# Patient Record
Sex: MALE | Race: WHITE
[De-identification: names, ages, dates, MRNs, and addresses within clinical notes are randomized per-mention and may not be internally consistent; named-entity substitution may affect disease eponyms.]

---

## 2019-07-24 ENCOUNTER — HOSPITAL ENCOUNTER (EMERGENCY)
Dept: HOSPITAL 44 - ED | Age: 59
Discharge: ANOTHER HOSPITAL | End: 2019-07-24
Payer: SELF-PAY

## 2019-07-24 VITALS — SYSTOLIC BLOOD PRESSURE: 131 MMHG | DIASTOLIC BLOOD PRESSURE: 82 MMHG

## 2019-07-24 DIAGNOSIS — R07.2: Primary | ICD-10-CM

## 2019-07-24 LAB
BASOPHILS NFR BLD: 0.5 % (ref 0–1.5)
EGFR (NON-AFRICAN): > 60
MCV RBC AUTO: 87 FL (ref 80–100)
NEUTROPHILS #: 2.9 # K/UL (ref 1.4–7.7)

## 2019-07-24 PROCEDURE — 71045 X-RAY EXAM CHEST 1 VIEW: CPT

## 2019-07-24 PROCEDURE — 99283 EMERGENCY DEPT VISIT LOW MDM: CPT

## 2019-07-24 PROCEDURE — 99284 EMERGENCY DEPT VISIT MOD MDM: CPT

## 2019-07-24 PROCEDURE — 85025 COMPLETE CBC W/AUTO DIFF WBC: CPT

## 2019-07-24 PROCEDURE — 80053 COMPREHEN METABOLIC PANEL: CPT

## 2019-07-24 PROCEDURE — S1016 NON-PVC INTRAVENOUS ADMINIST: HCPCS

## 2019-07-24 PROCEDURE — 96372 THER/PROPH/DIAG INJ SC/IM: CPT

## 2019-07-24 PROCEDURE — 84484 ASSAY OF TROPONIN QUANT: CPT

## 2019-07-24 NOTE — ED PHYSICIAN DOCUMENTATION
Chest Pain





- HISTORIAN


Historian: patient





- HPI


Stated Complaint: "my chest pain is flaring up again"


Chief Complaint: Chest Pain


Additional Information: 





Patient presents to ED with a 45 minute history of left chest pain.   Patient 

states he was riding in his friends [place] when he began to 

have left sided chest pain, sharp, 9/10, radiating into left arm, left neck with

associated shortness of breath.  He has a cardiac history with 3 stents (2005, 

2015).  He just saw his cardiologist 2 weeks ago and with no change in his plan 

of care.  He took 3 nitro and ASA 325mg just prior to arrival with no improvem

ent in his chest pain.  He asks if he can have pain meds.  


Onset: minutes (45)


Timing: sudden onset


Duration: sudden-onset


Last known Well Date: 07/24/19


Last Known Well Time: 17:00


Context: other


Severity: moderate


Quality: sharp


Chest Pain Radiation: arms (left ), neck


Chest Pain Signs/Symptoms: denies: nausea, vomiting


Worsened By: nothing


Relieved By: nothing





- ROS


CONST: none


MS/LYMPH: none


GI/: denies: vomiting, nausea


EYES/ENT: none


SKIN/ENDO: none


NEURO/PSYCH: denies: headache





- PAST HX


MI risk factors: cardiac disease


DVT/PE Risk Factors: none


TAD/AAA risk factors: none


Neuro deficit: CVA


GI disease: none


Lung disease: none


Surgeries/Procedures: cardiac cath, cardiac stent


Allergies/Adverse Reactions: 


                                    Allergies











Allergy/AdvReac Type Severity Reaction Status Date / Time


 


albuterol Allergy   Verified 07/24/19 16:43











Home Medications: 


                                Ambulatory Orders











 Medication  Instructions  Recorded


 


Clopidogrel Bisulfate [Clopidogrel] 1 tab PO DAILY 07/24/19


 


Metoprolol Succinate [Kapspargo 0.25 tab PO DAILY 07/24/19





Sprinkle]  


 


Nitroglycerin [Nitroquick] 1 tab SQ Q5 PRN 07/24/19


 


Pregabalin [Lyrica] 1 cap PO BID 07/24/19


 


Ranolazine [Ranexa] 1 tab PO BID 07/24/19


 


Rosuvastatin Calcium [Crestor] 1 tab PO HS 07/24/19


 


Tiotropium Bromide [Spiriva] 1 cap INH DAILY 07/24/19














- SOCIAL HX


Smoking History: cigarettes, greater than 1 pack/day


Alcohol Use: none


Drug Use: none





- FAMILY HX


Family HX: none





- VITAL SIGNS


Vital Signs: 





                                   Vital Signs











Temp Pulse Resp BP Pulse Ox


 


    101 H  16   120/73   95 


 


    07/24/19 16:28  07/24/19 16:28  07/24/19 16:28  07/24/19 16:28














- REVIEWED ASSESSMENTS


Nursing Assessment  Reviewed: Yes


Vitals Reviewed: Yes





Progress





- Progress


Progress: 





1820  Patient getting ready for discharge after negative second troponin.  

Patient was chest pain free at this time.  Once patient was informed we did not 

provide Medicaid transport to his home in [facility].  He got extremely angry. 

  He consulted us on how to get medicaid transport.  When we were unable to 

arrange transport he stated he wanted to be transferred to another facility. He 

reported the return to his chest pain, and stated, "something is wrong".





1933  Discussed transfer with Dr. Stokes, hospitalist at [facility].   She 

accepts transfer.  We will arrange transfer.  





1935  Patient ambulating to nurses station asking for the WiFi password.  He is 

not complaining of chest pain and is no apparent distress.  





2053  EMS transporting patient to [facility]. 





- EKG/XRAY/CT


Comments: 1636 NSR  99 bpm,  NO ST elevation





ED Results Lab/Radiology





- Lab Results


Lab Results: 





                                   Lab Results











  07/24/19





  16:40


 


WBC  5.30 K/ul K/ul





   (4.00-12.00) 


 


RBC  3.78 M/ul L M/ul





   (3.90-5.20) 


 


Hgb  11.2 g/dL L g/dL





   (12.0-18.0) 


 


Hct  33.0 % L %





   (37.0-53.0) 


 


MCV  87.0 fl fl





   (80.0-100.0) 


 


MCH  29.7 pg pg





   (28.0-34.0) 


 


MCHC  34.0 g/dL g/dL





   (30.0-36.0) 


 


RDW  13.1 % %





   (11.3-14.3) 


 


Plt Count  250 K/mm3 K/mm3





   (130-400) 


 


Neut % (Auto)  55.6 % %





   (39.0-79.0) 


 


Lymph % (Auto)  28.0 % %





   (16.0-50.0) 


 


Mono % (Auto)  10.2 % %





   (0.0-11.0) 


 


Eos % (Auto)  5.7 % %





   (0.0-6.8) 


 


Baso % (Auto)  0.5 % %





   (0.0-1.5) 


 


Neut # (Auto)  2.9 # k/uL # k/uL





   (1.4-7.7) 


 


Lymph # (Auto)  1.5 # k/uL # k/uL





   (0.6-4.0) 


 


Mono # (Auto)  0.5 # k/uL # k/uL





   (0.0-0.9) 


 


Eos # (Auto)  0.3 # k/uL # k/uL





   (0.0-0.6) 


 


Baso # (Auto)  0.0 # k/uL # k/uL





   (0.0-0.5) 














- Orders


Orders: 





                                    ED Orders











 Category Date Time Status


 


 Place IV Lock 1T Care  07/24/19 16:41 Active


 


 CHEST 1VIEW [RAD] Stat Exams  07/24/19 Ordered


 


 CBC/PLATELET/DIFF Routine Lab  07/24/19 16:40 Completed


 


 CMP Routine Lab  07/24/19 16:40 Received


 


 TROPONIN I Stat Lab  07/24/19 16:40 Received


 


 EKG WITH COMPARISON Stat Ther  07/24/19 Ordered














Chest Pain Physical Exam





- EXAM


General Appearance: no acute distress, alert


EENT: NYA


Neck: nml inspection, no carotid bruit


Respiratory: no resp. distress, chest non-tender, nml breath sounds


CVS: reg. rate & rhythm, no murmur


Abdomen: soft, normal bowel sounds, non-tender


Skin: warm/dry


Extremities: non-tender


Neuro: oriented X3, motor nml, mood/affect nml





Discharge


Clincal Impression: 


Chest pain


Qualifiers:


 Chest pain type: precordial pain Qualified Code(s): R07.2 - Precordial pain





Referrals: 


Primary Doctor,No [Primary Care Provider] - 2 Days


Additional Instructions: 


1.  Follow up with your Cardiologist as soon as possible


2.  Resume all your home medications as previously prescribed. 


3.  Return to ER for new or worsening symptoms.  





Condition: Stable


Disposition: 02 XFER [facility]M HOSP


Decision to Admit: NO


Date of Decison to Admit: 07/24/19


Decision Time: 19:33

## 2019-07-25 NOTE — DIAGNOSTIC IMAGING REPORT
CAROLANN VARGAS 

[facility]

[address] [address] [address] [facility] [address] [address]

[place], [place]. [address]

 

 

 

 

Report Submission Date: 2019 5:23:09 PM CDT

Patient       Study

Name:   ROBBY ALLISON       Date:   2019 4:44:35 PM CDT

MRN:   [id number]       Modality Type:   DX

Gender:   M       Description:   CHEST 1VIEW

:   60       Institution:   [facility]

Physician:   CAROLANN VARGAS

     Accession:    [id number]

 

 

Portable chest 



History:  Chest pain 



Portable chest dated 2019 demonstrates a single lead pacing device.  
Heart size is within normal limits.  Pulmonary vascularity is normal.  Lungs are
clear. 



Impression: 



Single lead pacing device. 



No active disease.

 

Electronically signed on 2019 5:23:09 PM CDT by:

Luz CURRAN

## 2020-07-09 ENCOUNTER — HOSPITAL ENCOUNTER (INPATIENT)
Dept: HOSPITAL 61 - ER | Age: 60
LOS: 1 days | Discharge: OTHER INSTITUTION | DRG: 206 | End: 2020-07-10
Attending: INTERNAL MEDICINE | Admitting: INTERNAL MEDICINE
Payer: MEDICAID

## 2020-07-09 VITALS — DIASTOLIC BLOOD PRESSURE: 51 MMHG | SYSTOLIC BLOOD PRESSURE: 95 MMHG

## 2020-07-09 VITALS — SYSTOLIC BLOOD PRESSURE: 133 MMHG | DIASTOLIC BLOOD PRESSURE: 82 MMHG

## 2020-07-09 VITALS — HEIGHT: 67 IN | WEIGHT: 145.51 LBS | BODY MASS INDEX: 22.84 KG/M2

## 2020-07-09 VITALS — DIASTOLIC BLOOD PRESSURE: 55 MMHG | SYSTOLIC BLOOD PRESSURE: 107 MMHG

## 2020-07-09 DIAGNOSIS — E78.00: ICD-10-CM

## 2020-07-09 DIAGNOSIS — I42.8: ICD-10-CM

## 2020-07-09 DIAGNOSIS — Z88.8: ICD-10-CM

## 2020-07-09 DIAGNOSIS — Z90.81: ICD-10-CM

## 2020-07-09 DIAGNOSIS — I25.5: ICD-10-CM

## 2020-07-09 DIAGNOSIS — M19.90: ICD-10-CM

## 2020-07-09 DIAGNOSIS — F15.11: ICD-10-CM

## 2020-07-09 DIAGNOSIS — D64.9: ICD-10-CM

## 2020-07-09 DIAGNOSIS — I50.9: ICD-10-CM

## 2020-07-09 DIAGNOSIS — E78.5: ICD-10-CM

## 2020-07-09 DIAGNOSIS — F41.9: ICD-10-CM

## 2020-07-09 DIAGNOSIS — I25.10: ICD-10-CM

## 2020-07-09 DIAGNOSIS — Z95.810: ICD-10-CM

## 2020-07-09 DIAGNOSIS — Z83.3: ICD-10-CM

## 2020-07-09 DIAGNOSIS — I25.2: ICD-10-CM

## 2020-07-09 DIAGNOSIS — J45.909: ICD-10-CM

## 2020-07-09 DIAGNOSIS — Z82.49: ICD-10-CM

## 2020-07-09 DIAGNOSIS — Z86.73: ICD-10-CM

## 2020-07-09 DIAGNOSIS — K21.9: ICD-10-CM

## 2020-07-09 DIAGNOSIS — M94.0: Primary | ICD-10-CM

## 2020-07-09 DIAGNOSIS — F17.210: ICD-10-CM

## 2020-07-09 DIAGNOSIS — Z95.5: ICD-10-CM

## 2020-07-09 DIAGNOSIS — I11.0: ICD-10-CM

## 2020-07-09 LAB
ALBUMIN SERPL-MCNC: 3.2 G/DL (ref 3.4–5)
ALBUMIN/GLOB SERPL: 0.9 {RATIO} (ref 1–1.7)
ALP SERPL-CCNC: 88 U/L (ref 46–116)
ALT SERPL-CCNC: 19 U/L (ref 16–63)
AMORPH SED URNS QL MICRO: PRESENT /HPF
AMPHETAMINE/METHAMPHETAMINE: (no result)
ANION GAP SERPL CALC-SCNC: 6 MMOL/L (ref 6–14)
APTT BLD: 33 SEC (ref 24–38)
APTT PPP: YELLOW S
AST SERPL-CCNC: 16 U/L (ref 15–37)
BACTERIA #/AREA URNS HPF: 0 /HPF
BARBITURATES UR-MCNC: (no result) UG/ML
BASOPHILS # BLD AUTO: 0.1 X10^3/UL (ref 0–0.2)
BASOPHILS NFR BLD: 1 % (ref 0–3)
BENZODIAZ UR-MCNC: (no result) UG/L
BILIRUB SERPL-MCNC: 0.3 MG/DL (ref 0.2–1)
BILIRUB UR QL STRIP: NEGATIVE
BUN SERPL-MCNC: 10 MG/DL (ref 8–26)
BUN/CREAT SERPL: 8 (ref 6–20)
CALCIUM SERPL-MCNC: 8.1 MG/DL (ref 8.5–10.1)
CANNABINOIDS UR-MCNC: (no result) UG/L
CHLORIDE SERPL-SCNC: 105 MMOL/L (ref 98–107)
CK SERPL-CCNC: 125 U/L (ref 39–308)
CO2 SERPL-SCNC: 30 MMOL/L (ref 21–32)
COCAINE UR-MCNC: (no result) NG/ML
CREAT SERPL-MCNC: 1.2 MG/DL (ref 0.7–1.3)
EOSINOPHIL NFR BLD: 0.2 X10^3/UL (ref 0–0.7)
EOSINOPHIL NFR BLD: 5 % (ref 0–3)
ERYTHROCYTE [DISTWIDTH] IN BLOOD BY AUTOMATED COUNT: 16.7 % (ref 11.5–14.5)
FIBRINOGEN PPP-MCNC: CLEAR MG/DL
GFR SERPLBLD BASED ON 1.73 SQ M-ARVRAT: 61.8 ML/MIN
GLOBULIN SER-MCNC: 3.6 G/DL (ref 2.2–3.8)
GLUCOSE SERPL-MCNC: 91 MG/DL (ref 70–99)
HCT VFR BLD CALC: 26.8 % (ref 39–53)
HGB BLD-MCNC: 8.9 G/DL (ref 13–17.5)
LIPASE: 94 U/L (ref 73–393)
LYMPHOCYTES # BLD: 1.1 X10^3/UL (ref 1–4.8)
LYMPHOCYTES NFR BLD AUTO: 20 % (ref 24–48)
MAGNESIUM SERPL-MCNC: 2 MG/DL (ref 1.8–2.4)
MCH RBC QN AUTO: 27 PG (ref 25–35)
MCHC RBC AUTO-ENTMCNC: 33 G/DL (ref 31–37)
MCV RBC AUTO: 80 FL (ref 79–100)
METHADONE SERPL-MCNC: (no result) NG/ML
MONO #: 0.7 X10^3/UL (ref 0–1.1)
MONOCYTES NFR BLD: 13 % (ref 0–9)
NEUT #: 3.3 X10^3/UL (ref 1.8–7.7)
NEUTROPHILS NFR BLD AUTO: 61 % (ref 31–73)
NITRITE UR QL STRIP: NEGATIVE
OPIATES UR-MCNC: (no result) NG/ML
PCP SERPL-MCNC: (no result) MG/DL
PH UR STRIP: 7 [PH]
PLATELET # BLD AUTO: 266 X10^3/UL (ref 140–400)
POTASSIUM SERPL-SCNC: 4 MMOL/L (ref 3.5–5.1)
PROT SERPL-MCNC: 6.8 G/DL (ref 6.4–8.2)
PROT UR STRIP-MCNC: NEGATIVE MG/DL
PROTHROMBIN TIME: 13.8 SEC (ref 11.7–14)
RBC # BLD AUTO: 3.36 X10^6/UL (ref 4.3–5.7)
RBC #/AREA URNS HPF: (no result) /HPF (ref 0–2)
SODIUM SERPL-SCNC: 141 MMOL/L (ref 136–145)
UROBILINOGEN UR-MCNC: 1 MG/DL
WBC # BLD AUTO: 5.4 X10^3/UL (ref 4–11)
WBC #/AREA URNS HPF: 0 /HPF (ref 0–4)

## 2020-07-09 PROCEDURE — 93306 TTE W/DOPPLER COMPLETE: CPT

## 2020-07-09 PROCEDURE — 83550 IRON BINDING TEST: CPT

## 2020-07-09 PROCEDURE — 83540 ASSAY OF IRON: CPT

## 2020-07-09 PROCEDURE — G0378 HOSPITAL OBSERVATION PER HR: HCPCS

## 2020-07-09 PROCEDURE — 80061 LIPID PANEL: CPT

## 2020-07-09 PROCEDURE — 99285 EMERGENCY DEPT VISIT HI MDM: CPT

## 2020-07-09 PROCEDURE — 80307 DRUG TEST PRSMV CHEM ANLYZR: CPT

## 2020-07-09 PROCEDURE — 82553 CREATINE MB FRACTION: CPT

## 2020-07-09 PROCEDURE — 84484 ASSAY OF TROPONIN QUANT: CPT

## 2020-07-09 PROCEDURE — 81001 URINALYSIS AUTO W/SCOPE: CPT

## 2020-07-09 PROCEDURE — 93005 ELECTROCARDIOGRAM TRACING: CPT

## 2020-07-09 PROCEDURE — 85379 FIBRIN DEGRADATION QUANT: CPT

## 2020-07-09 PROCEDURE — 83690 ASSAY OF LIPASE: CPT

## 2020-07-09 PROCEDURE — 80053 COMPREHEN METABOLIC PANEL: CPT

## 2020-07-09 PROCEDURE — 71045 X-RAY EXAM CHEST 1 VIEW: CPT

## 2020-07-09 PROCEDURE — 85025 COMPLETE CBC W/AUTO DIFF WBC: CPT

## 2020-07-09 PROCEDURE — 85730 THROMBOPLASTIN TIME PARTIAL: CPT

## 2020-07-09 PROCEDURE — 83735 ASSAY OF MAGNESIUM: CPT

## 2020-07-09 PROCEDURE — 80048 BASIC METABOLIC PNL TOTAL CA: CPT

## 2020-07-09 PROCEDURE — 96375 TX/PRO/DX INJ NEW DRUG ADDON: CPT

## 2020-07-09 PROCEDURE — 83880 ASSAY OF NATRIURETIC PEPTIDE: CPT

## 2020-07-09 PROCEDURE — 96374 THER/PROPH/DIAG INJ IV PUSH: CPT

## 2020-07-09 PROCEDURE — 36415 COLL VENOUS BLD VENIPUNCTURE: CPT

## 2020-07-09 PROCEDURE — 85610 PROTHROMBIN TIME: CPT

## 2020-07-09 RX ADMIN — FENTANYL CITRATE PRN MCG: 50 INJECTION INTRAMUSCULAR; INTRAVENOUS at 17:48

## 2020-07-09 RX ADMIN — FENTANYL CITRATE PRN MCG: 50 INJECTION INTRAMUSCULAR; INTRAVENOUS at 14:56

## 2020-07-09 RX ADMIN — IPRATROPIUM BROMIDE SCH MG: 0.5 SOLUTION RESPIRATORY (INHALATION) at 16:00

## 2020-07-09 RX ADMIN — IPRATROPIUM BROMIDE SCH MG: 0.5 SOLUTION RESPIRATORY (INHALATION) at 08:40

## 2020-07-09 RX ADMIN — PREGABALIN SCH MG: 75 CAPSULE ORAL at 21:02

## 2020-07-09 RX ADMIN — FENTANYL CITRATE PRN MCG: 50 INJECTION INTRAMUSCULAR; INTRAVENOUS at 21:03

## 2020-07-09 NOTE — PDOC1
History and Physical


Date of Admission


Date of Admission


DATE: 7/9/20 


TIME: 14:19





Identification/Chief Complaint


Chief Complaint


Chest pain





Source


Source:  Patient





History of Present Illness


History of Present Illness


Mr Bishop is a 59yo M w/ PMHx CAD x multiple GENNY, CHF, HTN, HLD, VT s/p AICD, 

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

chest pain with radiation to left arm and jaw. Happened in the [facility] and 

currently visiting from the [facility] where he lives. Took 3 NTG with 

improvement of pain from 10/10 to 6/10 and had 324mg ASA enroute from EMS. No 

nausea or vomiting no chills no fever no recent sick contacts.  He has shortness

of breath at its baseline.  He notes he takes Spiriva only as an inhaler and 

would like to continue to do so.


Troponin negative, BNP 5044, , K4, BUN 10, CR 1.2, glucose 91, WBC 5.4 

currently Hb 8.9 with prior of 13 noted INR 1.1 platelets 266.  EKG with lateral

TWI and leftward axis, no STEMI.


Chest x-ray with patchy bibasilar opacities unchanged from prior, likely 

atelectasis.





Historically with multiple PCIs- LHC at [facility] Jan 2018 showed showed patent

mid LAD stent, patent 1st diagonal stent, total chronic occlusion of first 

obtuse marginal with territory infarct but no indication for PCI, patent stent 

in left AV groove artery, patent stent in LPDA, non-dominant 100 % stenosis of 

proximal RCA. Seen by cardiology here in January.


He does continue to smoke states he smokes less than half pack a day, he is 

asking me for food and would like to talk about pain medications that he can go 

home on.  I advised him that given his history of substance abuse this is ill 

advised.


Admit for further work-up of his coronary artery disease





Past Medical History


Cardiovascular:  CAD, CHF, HTN, Hyperlipidemia, Other


Pulmonary:  Asthma


CENTRAL NERVOUS SYSTEM:  CVA


GI:  GERD


Heme/Onc:  No pertinent hx


Hepatobiliary:  No pertinent hx


Psych:  Anxiety


Musculoskeletal:  Osteoarthritis


Rheumatologic:  No pertinent hx


Infectious disease:  No pertinent hx


Renal/:  No pertinent hx


Endocrine:  No pertinent hx





Past Surgical History


Past Surgical History:  Pacemaker, Other





Family History


Family History:  Coronary Artery Disease, Diabetes, Heart Disease, Hypertension





Social History


Smoke:  <1 pack per day


ALCOHOL:  none


Drugs:  Crystal meth (In remission), Other





Current Medications


Current Medications





Current Medications


Fentanyl Citrate (Fentanyl 2ml Vial) 50 mcg 1X  ONCE IV  Last administered on 

7/9/20at 13:17;  Start 7/9/20 at 13:15;  Stop 7/9/20 at 13:16;  Status DC


Ondansetron HCl (Zofran) 4 mg 1X  ONCE IV  Last administered on 7/9/20at 13:17; 

Start 7/9/20 at 13:15;  Stop 7/9/20 at 13:16;  Status DC





Active Scripts


Active


Reported


Spiriva (Tiotropium Bromide) 18 Mcg Cap.w.dev 1 Cap IH DAILY


Atorvastatin Calcium 10 Mg Tablet 10 Mg PO HS


Metoprolol Succinate ( Xl ) (Metoprolol Succinate) 25 Mg Tab.er.24h 12.5 Mg PO 

DAILY


Spiriva (Tiotropium Hidalgo) 18 Mcg Cap.w.dev 1 Cap IH DAILY


Ms Contin (Morphine Sulfate) 30 Mg Tablet.er 1 Tab PO BID PRN


NITROGLYCERIN SubLingual (Nitroglycerin) 0.4 Mg Tab.subl 0.4 Mg SL PRN Q5MIN PRN


Lyrica (Pregabalin) 300 Mg Capsule 1 Cap PO BID


Plavix (Clopidogrel Bisulfate) 75 Mg Tablet 75 Mg PO DAILY


Aspir 81 (Aspirin) 81 Mg Tablet.dr 1 Tab PO DAILY





Allergies


Allergies:  


Coded Allergies:  


     albuterol (Verified  Adverse Reaction, Intermediate, 1/22/20)


   


   Patient states "it speeds my heart up too much"


     ibuprofen (Verified  Adverse Reaction, Intermediate, Nausea and Vomiting, 

1/22/20)





ROS


General:  YES: Fatigue, Malaise; 


   No: Chills, Night Sweats, Appetite, Other


PSYCHOLOGICAL ROS:  YES: Anxiety; 


   No: Behavioral Disorder, Concentration difficultie, Decreased libido, 

Depression, Disorientation, Hallucinations, Hostility, Irritablity, Memory 

difficulties, Mood Swings, Obsessive thoughts, Physical abuse, Sexual abuse, 

Sleep disturbances, Suicidal ideation, Other


Eyes:  No Blurry vision, No Decreased vision, No Double vision, No Dry eyes, No 

Excessive tearing, No Eye Pain, No Itchy Eyes, No Loss of vision, No 

Photophobia, No Scotomata, No Uses contacts, No Uses glasses, No Other


HEENT:  No: Heacaches, Visual Changes, Hearing change, Nasal congestion, Nasal 

discharge, Oral lesions, Sinus pain, Sore Throat, Epistaxis, Sneezing, Snoring, 

Tinnitus, Vertigo, Vocal changes, Other


ALLERGY AND IMMUNOLOGY:  No: Hives, Insect Bite Sensitivity, Itchy/Watery Eyes, 

Nasal Congestion, Post Nasal Drip, Seasonal Allergies, Other


Hematological and Lymphatic:  No: Bleeding Problems, Blood Clots, Blood 

Transfusions, Brusing, Night Sweats, Pallor, Swollen Lymph Nodes, Other


ENDOCRINE:  No: Breast Changes, Galactorrhea, Hair Pattern Changes, Hot Flashes,

Malaise/lethargy, Mood Swings, Palpitations, Polydipsia/polyuria, Skin Changes, 

Temperature Intolerance, Unexpected Weight Changes, Other


Breast:  No New/Changing Breast Lumps, No Nipple changes, No Nipple discharge, 

No Other


Respiratory:  YES: Shortness of breath, Wheezing; 


   No: Cough, Hemoptysis, Orthopnea, Pleuritic Pain, SOB with excertion, Sputum 

Changes, Stridor, Tachypnea, Other


Cardiovascular:  yes Chest Pain; 


   No Palpitations, No Orthopnea, No Paroxysmal Noc. Dyspnea, No Edema, No Lt 

Headedness, No Other


Gastrointestinal:  No Nausea, No Vomiting, No Abdominal Pain, No Diarrhea, No 

Constipation, No Melena, No Hematochezia, No Other


Genitourinary:  No Dysuria, No Frequency, No Incontinence, No Hematuria, No 

Retention, No Discharge, No Urgency, No Pain, No Flank Pain, No Other, No , No ,

No , No , No , No , No 


Musculoskeletal:  No Gait Disturbance, No Joint Pain, No Joint Stiffness, No 

Joint Swelling, No Muscle Pain, No Muscular Weakness, No Pain In:, No Swelling 

In:, No Other


Neurological:  No Behavorial Changes, No Bowel/Bladder ControlChng, No 

Confusion, No Dizziness, No Gait Disturbance, No Headaches, No Impaired 

Coord/balance, No Memory Loss, No Numbness/Tingling, No Seizures, No Speech P

roblems, No Tremors, No Visual Changes, No Weakness, No Other


Skin:  No Dry Skin, No Eczema, No Hair Changes, No Lumps, No Mole Changes, No 

Mottling, No Nail Changes, No Pruritus, No Rash, No Skin Lesion Changes, No 

Other, No Acne





Physical Exam


General:  Alert, Oriented X3, Cooperative, No acute distress


HEENT:  Atraumatic, PERRLA, EOMI, Mucous membr. moist/pink


Lungs:  Other (scattered wheezing)


Heart:  S1S2, RRR, no thrills, no rubs, no gallops, no murmurs


Abdomen:  Normal bowel sounds, Soft, No tenderness, No hepatosplenomegaly, No 

masses


Rectal Exam:  not examined


Extremities:  No clubbing, No cyanosis, No edema, Normal pulses, No 

tenderness/swelling


Skin:  No rashes, No breakdown, No significant lesion, Other (thrombophlebitis 

scarring)


Neuro:  Normal gait, Normal speech, Strength at 5/5 X4 ext, Normal tone, 

Sensation intact, Cranial nerves 3-12 NL, Reflexes 2+


Psych/Mental Status:  Mental status NL, Mood NL





Vitals


Vitals





Vital Signs








  Date Time  Temp Pulse Resp B/P (MAP) Pulse Ox O2 Delivery O2 Flow Rate FiO2


 


7/9/20 13:29  78  107/65 (79) 97 Room Air  


 


7/9/20 12:30 98.3  20     





 98.3       











Labs


Labs





Laboratory Tests








Test


 7/9/20


13:00 7/9/20


13:24


 


White Blood Count


 5.4 x10^3/uL


(4.0-11.0) 





 


Red Blood Count


 3.36 x10^6/uL


(4.30-5.70) 





 


Hemoglobin


 8.9 g/dL


(13.0-17.5) 





 


Hematocrit


 26.8 %


(39.0-53.0) 





 


Mean Corpuscular Volume 80 fL ()  


 


Mean Corpuscular Hemoglobin 27 pg (25-35)  


 


Mean Corpuscular Hemoglobin


Concent 33 g/dL


(31-37) 





 


Red Cell Distribution Width


 16.7 %


(11.5-14.5) 





 


Platelet Count


 266 x10^3/uL


(140-400) 





 


Neutrophils (%) (Auto) 61 % (31-73)  


 


Lymphocytes (%) (Auto) 20 % (24-48)  


 


Monocytes (%) (Auto) 13 % (0-9)  


 


Eosinophils (%) (Auto) 5 % (0-3)  


 


Basophils (%) (Auto) 1 % (0-3)  


 


Neutrophils # (Auto)


 3.3 x10^3/uL


(1.8-7.7) 





 


Lymphocytes # (Auto)


 1.1 x10^3/uL


(1.0-4.8) 





 


Monocytes # (Auto)


 0.7 x10^3/uL


(0.0-1.1) 





 


Eosinophils # (Auto)


 0.2 x10^3/uL


(0.0-0.7) 





 


Basophils # (Auto)


 0.1 x10^3/uL


(0.0-0.2) 





 


Prothrombin Time


 13.8 SEC


(11.7-14.0) 





 


Prothromb Time International


Ratio 1.1 (0.8-1.1) 


 





 


Activated Partial


Thromboplast Time 33 SEC (24-38) 


 





 


Sodium Level


 141 mmol/L


(136-145) 





 


Potassium Level


 4.0 mmol/L


(3.5-5.1) 





 


Chloride Level


 105 mmol/L


() 





 


Carbon Dioxide Level


 30 mmol/L


(21-32) 





 


Anion Gap 6 (6-14)  


 


Blood Urea Nitrogen


 10 mg/dL


(8-26) 





 


Creatinine


 1.2 mg/dL


(0.7-1.3) 





 


Estimated GFR


(Cockcroft-Gault) 61.8 


 





 


BUN/Creatinine Ratio 8 (6-20)  


 


Glucose Level


 91 mg/dL


(70-99) 





 


Calcium Level


 8.1 mg/dL


(8.5-10.1) 





 


Magnesium Level


 2.0 mg/dL


(1.8-2.4) 





 


Total Bilirubin


 0.3 mg/dL


(0.2-1.0) 





 


Aspartate Amino Transf


(AST/SGOT) 16 U/L (15-37) 


 





 


Alanine Aminotransferase


(ALT/SGPT) 19 U/L (16-63) 


 





 


Alkaline Phosphatase


 88 U/L


() 





 


Creatine Kinase


 125 U/L


() 





 


Creatine Kinase MB (Mass)


 0.9 ng/mL


(0.0-3.6) 





 


Creatine Kinase MB Relative


Index 0.7 % (0-4) 


 





 


Troponin I Quantitative


 < 0.017 ng/mL


(0.000-0.055) 





 


NT-Pro-B-Type Natriuretic


Peptide 5044 pg/mL


(0-124) 





 


Total Protein


 6.8 g/dL


(6.4-8.2) 





 


Albumin


 3.2 g/dL


(3.4-5.0) 





 


Albumin/Globulin Ratio 0.9 (1.0-1.7)  


 


Lipase


 94 U/L


() 





 


Urine Collection Type  Unknown 


 


Urine Color  Yellow 


 


Urine Clarity  Clear 


 


Urine pH  7.0 (<5.0-8.0) 


 


Urine Specific Gravity


 


 1.010


(1.000-1.030)


 


Urine Protein


 


 Negative mg/dL


(NEG-TRACE)


 


Urine Glucose (UA)


 


 Negative mg/dL


(NEG)


 


Urine Ketones (Stick)


 


 Negative mg/dL


(NEG)


 


Urine Blood  Negative (NEG) 


 


Urine Nitrite  Negative (NEG) 


 


Urine Bilirubin  Negative (NEG) 


 


Urine Urobilinogen Dipstick


 


 1.0 mg/dL (0.2


mg/dL)


 


Urine Leukocyte Esterase  Negative (NEG) 


 


Urine RBC  1-2 /HPF (0-2) 


 


Urine WBC  0 /HPF (0-4) 


 


Urine Amorphous Sediment  Present /HPF 


 


Urine Bacteria  0 /HPF (0-FEW) 








Laboratory Tests








Test


 7/9/20


13:00 7/9/20


13:24


 


White Blood Count


 5.4 x10^3/uL


(4.0-11.0) 





 


Red Blood Count


 3.36 x10^6/uL


(4.30-5.70) 





 


Hemoglobin


 8.9 g/dL


(13.0-17.5) 





 


Hematocrit


 26.8 %


(39.0-53.0) 





 


Mean Corpuscular Volume 80 fL ()  


 


Mean Corpuscular Hemoglobin 27 pg (25-35)  


 


Mean Corpuscular Hemoglobin


Concent 33 g/dL


(31-37) 





 


Red Cell Distribution Width


 16.7 %


(11.5-14.5) 





 


Platelet Count


 266 x10^3/uL


(140-400) 





 


Neutrophils (%) (Auto) 61 % (31-73)  


 


Lymphocytes (%) (Auto) 20 % (24-48)  


 


Monocytes (%) (Auto) 13 % (0-9)  


 


Eosinophils (%) (Auto) 5 % (0-3)  


 


Basophils (%) (Auto) 1 % (0-3)  


 


Neutrophils # (Auto)


 3.3 x10^3/uL


(1.8-7.7) 





 


Lymphocytes # (Auto)


 1.1 x10^3/uL


(1.0-4.8) 





 


Monocytes # (Auto)


 0.7 x10^3/uL


(0.0-1.1) 





 


Eosinophils # (Auto)


 0.2 x10^3/uL


(0.0-0.7) 





 


Basophils # (Auto)


 0.1 x10^3/uL


(0.0-0.2) 





 


Prothrombin Time


 13.8 SEC


(11.7-14.0) 





 


Prothromb Time International


Ratio 1.1 (0.8-1.1) 


 





 


Activated Partial


Thromboplast Time 33 SEC (24-38) 


 





 


Sodium Level


 141 mmol/L


(136-145) 





 


Potassium Level


 4.0 mmol/L


(3.5-5.1) 





 


Chloride Level


 105 mmol/L


() 





 


Carbon Dioxide Level


 30 mmol/L


(21-32) 





 


Anion Gap 6 (6-14)  


 


Blood Urea Nitrogen


 10 mg/dL


(8-26) 





 


Creatinine


 1.2 mg/dL


(0.7-1.3) 





 


Estimated GFR


(Cockcroft-Gault) 61.8 


 





 


BUN/Creatinine Ratio 8 (6-20)  


 


Glucose Level


 91 mg/dL


(70-99) 





 


Calcium Level


 8.1 mg/dL


(8.5-10.1) 





 


Magnesium Level


 2.0 mg/dL


(1.8-2.4) 





 


Total Bilirubin


 0.3 mg/dL


(0.2-1.0) 





 


Aspartate Amino Transf


(AST/SGOT) 16 U/L (15-37) 


 





 


Alanine Aminotransferase


(ALT/SGPT) 19 U/L (16-63) 


 





 


Alkaline Phosphatase


 88 U/L


() 





 


Creatine Kinase


 125 U/L


() 





 


Creatine Kinase MB (Mass)


 0.9 ng/mL


(0.0-3.6) 





 


Creatine Kinase MB Relative


Index 0.7 % (0-4) 


 





 


Troponin I Quantitative


 < 0.017 ng/mL


(0.000-0.055) 





 


NT-Pro-B-Type Natriuretic


Peptide 5044 pg/mL


(0-124) 





 


Total Protein


 6.8 g/dL


(6.4-8.2) 





 


Albumin


 3.2 g/dL


(3.4-5.0) 





 


Albumin/Globulin Ratio 0.9 (1.0-1.7)  


 


Lipase


 94 U/L


() 





 


Urine Collection Type  Unknown 


 


Urine Color  Yellow 


 


Urine Clarity  Clear 


 


Urine pH  7.0 (<5.0-8.0) 


 


Urine Specific Gravity


 


 1.010


(1.000-1.030)


 


Urine Protein


 


 Negative mg/dL


(NEG-TRACE)


 


Urine Glucose (UA)


 


 Negative mg/dL


(NEG)


 


Urine Ketones (Stick)


 


 Negative mg/dL


(NEG)


 


Urine Blood  Negative (NEG) 


 


Urine Nitrite  Negative (NEG) 


 


Urine Bilirubin  Negative (NEG) 


 


Urine Urobilinogen Dipstick


 


 1.0 mg/dL (0.2


mg/dL)


 


Urine Leukocyte Esterase  Negative (NEG) 


 


Urine RBC  1-2 /HPF (0-2) 


 


Urine WBC  0 /HPF (0-4) 


 


Urine Amorphous Sediment  Present /HPF 


 


Urine Bacteria  0 /HPF (0-FEW) 











Images


Images


CXR:


Patchy bibasilar opacities, unchanged. No pleural effusion. No pneumothorax. 

Left-sided ICD, unchanged. Normal heart size.


 


Impression: 


1.  Patchy bibasilar opacities, likely atelectasis.





VTE Prophylaxis Ordered


VTE Prophylaxis Devices:  Yes


VTE Pharmacological Prophylaxi:  Yes





Assessment/Plan


Assessment/Plan


A/P:


Chest pain - high risk ACS given his history and lateral TWI, will trend 

troponins, telemetry, consult cardiology


Ischemic cardiomyopathy -also with nonischemic cardiomyopathy due to 

methamphetamine use historically.  Fluid dynamics seems stable.


CAD - s/p x4 cardiac catherizations. multiple stents in the past. At least 12 

stents reported. 


VT s/p AICD - medtronic. Will consult cardiology. needs device interrogation


HTN - cont meds


HLD - cont meds


Hx of IV meth use - sober for 5 months. Tox screen negative and sees a counselor

through [facility]


Tobaccoism -counseled on cessation he is nondistended nicotine patch states he 

will need to start smoking as soon as he leaves


Anxiety/agitation -calmed with verbal counseling


History of Drug seeking behavior -advised with his substance abuse history is 

high risk and should not be given an opioid prescription on discharge and to 

minimize opioids while inpatient





FEN - Cardiac


PPX - Lovenox


FULL CODE


Dispo - inpatient for high risk ACS





Justicifation of Admission Dx:


Justifications for Admission:


Justification of Admission Dx:  Yes











FELICITY WHITLOCK MD         Jul 9, 2020 14:24

## 2020-07-09 NOTE — RAD
CHEST AP ONLY

 

History: Reason: chest pain / Spl. Instructions:  / History: 

 

Comparison: January 21, 2020

 

Findings:

Patchy bibasilar opacities, unchanged. No pleural effusion. No 

pneumothorax. Left-sided ICD, unchanged. Normal heart size.

 

Impression: 

1.  Patchy bibasilar opacities, likely atelectasis.

 

Electronically signed by: Nolan Larson DO (7/9/2020 1:26 PM) [id number]

## 2020-07-09 NOTE — PDOC2
ECHO PACHECO APRN 7/9/20 1553:


CARDIAC CONSULT


DATE OF CONSULT


Date of Consult


DATE: 7/9/20 


TIME: 15:26





REASON FOR CONSULT


Reason for Consult:


Chest pain





REFERRING PHYSICIAN


Referring Physician:


Terrell





SOURCE


Source:  Chart review, Patient





HISTORY OF PRESENT ILLNESS


HISTORY OF PRESENT ILLNESS


This is a 61 yo male admitted for complains of chest pain with radiation to left

arm and jaw. This occurred while he is in the [employer] and currently visiting from

the [facility]. He has taken 3 NTG and had some relief. Positive for 

fatigue but no SOA, n/v. He did became diaphoretic  and has significant cardiac 

history with multiple stents in the past. EMS called and was given ASA. He was 

evaluated here in 1/2020 and actually has been evaluated at  and [facility] in 

the past as well. He has ICM and has an AICD. He does have hx of noncompliance 

and continues to smoke tobacco. He saw his cardiologist in [place] 2 months ago

and told him that he is doing OK. No recent tress test nor TTE. He is somewhat 

irritable but cooperative.





PAST MEDICAL HISTORY


Past Medical History


Cardiovascular:  CAD, CHF (ICM), HTN, Hyperlipidemia, Other (VT)


Pulmonary:  Asthma


CENTRAL NERVOUS SYSTEM:  CVA


GI:  GERD


Heme/Onc:  No pertinent hx


Hepatobiliary:  No pertinent hx


Psych:  Anxiety


Musculoskeletal:  Osteoarthritis


Rheumatologic:  No pertinent hx


Infectious disease:  No pertinent hx


ENT:  No pertinent hx


Renal/:  No pertinent hx


Endocrine:  No pertinent hx


Dermatology:  No pertinent hx








PAST SURGICAL HISTORY


Past Surgical History


Pacemaker (AICD), Other (multiple PCIs- LHC at [facility]'s Jan 2018 showed showed

patent mid LAD stent, patent 1st diagonal stent, total chronic occlusion of 

first obtuse marginal with territory infarct but no indication for PCI, patent 

stent in left AV groove artery, patent stent in LPDA, non-dominant 100 % 

stenosis of proximal RCA. )





FAMILY HISTORY


Family History:  Hypertension





SOCIAL HISTORY


Social History


Smoke:  <1 pack per day


ALCOHOL:  none


Drugs:  Other (hx of meth use)


Lives:  with Family





CURRENT MEDICATIONS


CURRENT MEDICATIONS





Current Medications








 Medications


  (Trade)  Dose


 Ordered  Sig/Gwen


 Route


 PRN Reason  Start Time


 Stop Time Status Last Admin


Dose Admin


 


 Fentanyl Citrate


  (Fentanyl 2ml


 Vial)  50 mcg  1X  ONCE


 IV


   7/9/20 13:15


 7/9/20 13:16 DC 7/9/20 13:17





 


 Ondansetron HCl


  (Zofran)  4 mg  1X  ONCE


 IV


   7/9/20 13:15


 7/9/20 13:16 DC 7/9/20 13:17





 


 Fentanyl Citrate


  (Fentanyl 2ml


 Vial)  50 mcg  PRN Q1HR  PRN


 IV


 PAIN  7/9/20 14:45


 7/10/20 14:44  7/9/20 14:56














ALLERGIES


ALLERGIES:  


Coded Allergies:  


     albuterol (Verified  Adverse Reaction, Intermediate, 1/22/20)


   


   Patient states "it speeds my heart up too much"


     ibuprofen (Verified  Adverse Reaction, Intermediate, Nausea and Vomiting, 

1/22/20)





ROS


Review of System


14 point ROS evaluated with pertinent positives noted per HPI





PHYSICAL EXAM


General:  Alert, Oriented X3, Cooperative, No acute distress


HEENT:  Atraumatic, Mucous membr. moist/pink


Lungs:  Other (diminished bases)


Heart:  Regular rate (SR with LBBB), Normal S1, Normal S2


Abdomen:  Soft, No tenderness


Extremities:  No cyanosis, No edema


Skin:  No breakdown, No significant lesion


Neuro:  Normal speech, Sensation intact


Psych/Mental Status:  Mental status NL, Other (irritable)


MUSCULOSKELETAL:  Osteoarthritic changes both hands





VITALS/I&O


VITALS/I&O:





                                   Vital Signs








  Date Time  Temp Pulse Resp B/P (MAP) Pulse Ox O2 Delivery O2 Flow Rate FiO2


 


7/9/20 14:29  73  107/69 (82) 97 Room Air  


 


7/9/20 12:30 98.3  20     





 98.3       











LABS


Lab:





                                Laboratory Tests








Test


 7/9/20


13:00 7/9/20


13:24


 


White Blood Count


 5.4 x10^3/uL


(4.0-11.0) 





 


Red Blood Count


 3.36 x10^6/uL


(4.30-5.70)  L 





 


Hemoglobin


 8.9 g/dL


(13.0-17.5)  L 





 


Hematocrit


 26.8 %


(39.0-53.0)  L 





 


Mean Corpuscular Volume


 80 fL ()


 





 


Mean Corpuscular Hemoglobin 27 pg (25-35)   


 


Mean Corpuscular Hemoglobin


Concent 33 g/dL


(31-37) 





 


Red Cell Distribution Width


 16.7 %


(11.5-14.5)  H 





 


Platelet Count


 266 x10^3/uL


(140-400) 





 


Neutrophils (%) (Auto) 61 % (31-73)   


 


Lymphocytes (%) (Auto) 20 % (24-48)  L 


 


Monocytes (%) (Auto) 13 % (0-9)  H 


 


Eosinophils (%) (Auto) 5 % (0-3)  H 


 


Basophils (%) (Auto) 1 % (0-3)   


 


Neutrophils # (Auto)


 3.3 x10^3/uL


(1.8-7.7) 





 


Lymphocytes # (Auto)


 1.1 x10^3/uL


(1.0-4.8) 





 


Monocytes # (Auto)


 0.7 x10^3/uL


(0.0-1.1) 





 


Eosinophils # (Auto)


 0.2 x10^3/uL


(0.0-0.7) 





 


Basophils # (Auto)


 0.1 x10^3/uL


(0.0-0.2) 





 


Prothrombin Time


 13.8 SEC


(11.7-14.0) 





 


Prothrombin Time INR 1.1 (0.8-1.1)   


 


Activated Partial


Thromboplast Time 33 SEC (24-38)


 





 


D-Dimer (Camryn)


 0.63 ug/mlFEU


(0.00-0.50)  H 





 


Sodium Level


 141 mmol/L


(136-145) 





 


Potassium Level


 4.0 mmol/L


(3.5-5.1) 





 


Chloride Level


 105 mmol/L


() 





 


Carbon Dioxide Level


 30 mmol/L


(21-32) 





 


Anion Gap 6 (6-14)   


 


Blood Urea Nitrogen


 10 mg/dL


(8-26) 





 


Creatinine


 1.2 mg/dL


(0.7-1.3) 





 


Estimated GFR


(Cockcroft-Gault) 61.8  


 





 


BUN/Creatinine Ratio 8 (6-20)   


 


Glucose Level


 91 mg/dL


(70-99) 





 


Calcium Level


 8.1 mg/dL


(8.5-10.1)  L 





 


Magnesium Level


 2.0 mg/dL


(1.8-2.4) 





 


Total Bilirubin


 0.3 mg/dL


(0.2-1.0) 





 


Aspartate Amino Transferase


(AST) 16 U/L (15-37)


 





 


Alanine Aminotransferase (ALT)


 19 U/L (16-63)


 





 


Alkaline Phosphatase


 88 U/L


() 





 


Creatine Kinase


 125 U/L


() 





 


Creatine Kinase MB (Mass)


 0.9 ng/mL


(0.0-3.6) 





 


Creatine Kinase MB Relative


Index 0.7 % (0-4)  


 





 


Troponin I Quantitative


 < 0.017 ng/mL


(0.000-0.055) 





 


NT-Pro-B-Type Natriuretic


Peptide 5044 pg/mL


(0-124)  H 





 


Total Protein


 6.8 g/dL


(6.4-8.2) 





 


Albumin


 3.2 g/dL


(3.4-5.0)  L 





 


Albumin/Globulin Ratio


 0.9 (1.0-1.7)


L 





 


Lipase


 94 U/L


() 





 


Urine Collection Type  Unknown  


 


Urine Color  Yellow  


 


Urine Clarity  Clear  


 


Urine pH


 


 7.0 (<5.0-8.0)





 


Urine Specific Gravity


 


 1.010


(1.000-1.030)


 


Urine Protein


 


 Negative mg/dL


(NEG-TRACE)


 


Urine Glucose (UA)


 


 Negative mg/dL


(NEG)


 


Urine Ketones (Stick)


 


 Negative mg/dL


(NEG)


 


Urine Blood


 


 Negative (NEG)





 


Urine Nitrite


 


 Negative (NEG)





 


Urine Bilirubin


 


 Negative (NEG)





 


Urine Urobilinogen Dipstick


 


 1.0 mg/dL (0.2


mg/dL)


 


Urine Leukocyte Esterase


 


 Negative (NEG)





 


Urine RBC


 


 1-2 /HPF (0-2)





 


Urine WBC  0 /HPF (0-4)  


 


Urine Amorphous Sediment  Present /HPF  


 


Urine Bacteria


 


 0 /HPF (0-FEW)








                                Laboratory Tests


7/9/20 13:00








                                Laboratory Tests


7/9/20 13:00











STRESS TEST


[facility] 8/16/2018


Conclusion: Pharmacologic stress ECG is nondiagnostic for ischemia. Frequent 

ventricular bigeminy and trigeminy throughout the study. 


Pulmonary-to-Myocardial Count Ratio: 0.4 (normal = or < 0.52). 


Scintigraphic (planar/tomographic): Planar images reveal enlarged left ventricle

with decreased tracer uptake in the lateral segments. There is normal pulmonary 

tracer uptake. There is a mild degree of diaphragmatic attenuation present. No 

transient ischemic dilation is present. 


Tomographic images were reconstructed in three orthogonal views. There is 

moderate to large area of primarily fixed perfusion abnormality involving basal 

mid inferolateral segments of severe intensity. These areas do not appear to be 

viable. There are no significant reversible perfusion abnormalities. 


Polar coordinate map confirms the findings noted in the tomographic images. 


TID Ratio: 1.12 (normal <1.36). 





Summed Stress Score: 19 , Summed Rest Score: 16 





Regional Wall Thickening and Motion Post Stress: Decreased wall motion and 

thickening noted in the inferior and inferolateral segments. 


Left Ventricular Ejection Fraction (post stress, in the resting state) = 24 %. 


Left Ventricular End Diastolic Volume: 155 mL 





SUMMARY/OPINION: This study is definitely abnormal. There is moderate to large 

area of primarily fixed perfusion abnormality involving basal mid inferolateral 

segments of severe intensity. These areas do not appear to be viable. There are 

no significant reversible perfusion abnormalities. Left ventricle is dilated 

with severely depressed left ventricular systolic function. There are no other 

high risk prognostic indicators present. The ECG portion of the study is 

nondiagnostic for ischemia. Frequent ventricular bigeminy and trigeminy 

throughout the study. 


Study was compared with the prior study dated 12/11/2017, pharmacological stress

thallium study using DSPECT camera. Previous study was abnormal and 

demonstrated lateral fixed perfusion abnormality. Comparing the 2 studies there 

is no significant interval change in the perfusion pattern. Left ventricular 

ejection fraction is decreased from 42 to 24%. Left ventricle end-diastolic 

volume was 165 mL and pulmonary to myocardial count ratio was 0.33.





ASSESSMENT/PLAN


ASSESSMENT/PLAN


1. Chest pain: initial trop nml. EKG revealed LBBB which looks new


2. ICM/chronic systolic CHF: appears compensated


3. CAD; multiple stents in the past. At least 12 stents reported. see PCI report

above


4. Hx of VT: not on antiarrhythmic


5. AICD in situ: medtronic


7. HTN: controlled


8. HLP: not on goal


9. Hx of meth use


10. Tobaccoism with likely COPD


11. Hx of anxiety


12. Hx of Drug seeking behavior


13. Normocytic anemia: Hgb 8.9





Recommendations


1. [facility] is out of network per his insurance based on previous admission, Will 

verify with . If pt needs a [facility] then will need lateral transfer. Will 

provide x1 lovenox. 


2. Trend troponin and will obtain serial EKGs. . UDS and lipid panel. 


3. TTE


4. Restart home plavix and ASA and secondary prevention measures. 


5. Smoking cessation. Will interrogate device and note any significant 

arrhythmias





YARED DOMINGUEZ MD 7/9/20 [id number]:


CARDIAC CONSULT


ASSESSMENT/PLAN


ASSESSMENT/PLAN


Patient seen and examined


Discussed with our nurse practitioner and I agree with his assessment and plan.


Chest pain.  Initial troponin is normal.  Extensive history of coronary artery 

disease as above.  Left bundle branch block.  Will continue medical treatment 

and rule out for myocardial infarction.


Ischemic cardiomyopathy.  Decreased LV function as noted above.  Continuing 

present treatment.  We will recheck an echo to update LV function.


AICD.  Will interrogate.


Controlled hypertension.


Hyperlipidemia.  Will recheck labs and adjust medications as needed.


COPD with continued tobacco use.


Thank you for allowing us to participate in the care of your patient.











ECHO PACHECO           Jul 9, 2020 15:53


YARED DOMINGUEZ MD             Jul 9, 2020 17:45

## 2020-07-09 NOTE — PHYS DOC
Past Medical History


Past Medical History:  Asthma, CAD, CHF, CVA, High Cholesterol, Hypertension, 

MI, Other


Additional Past Medical Histor:  VTACH


Past Surgical History:  Pacemaker, Splenectomy, Other


Additional Past Surgical Histo:  12 cardiac stents, multiple cardiac 

catheterizations,WITH DEFIB


Smoking Status:  Current Every Day Smoker


Alcohol Use:  Rarely


Drug Use:  None





General Adult


EDM:


Chief Complaint:  CHEST PAIN





HPI:


HPI:





Patient is a 60  year old male who presents to the emergency department via EMS 

today with complaints of a sudden onset of left-sided chest pain that radiates 

to the left side of his neck, his left shoulder, and down his left arm while he 

was at the [facility] today.  Patient states he lives down at the [facility] but is up 

here visiting.  He reports feeling tired this morning but denies any recent 

fever, cough, body aches, chills, shortness of breath, nausea, vomiting, 

diarrhea, abdominal pain, or palpations.  He states he had just arrived at the 

[facility] when the pain developed and he became diaphoretic.  Patient reports a sig

nificant history of at least 4 MIs, 12 cardiac stents, multiple heart caths, 

coronary artery disease, CVA, high blood pressure, high cholesterol, and asthma.

 He states that when the chest pain developed he took 3 nitroglycerin tablets 

the pain went from a 8/10 to a 6/10 but quickly returned right back up to 8. He 

smokes 1/2 ppd of cigartettes. EMS gave the patient 324 mg of aspirin in route 

to the ER.  He currently rates the pain 8 out of 10 on the pain scale describes 

it as a sharp constant pain that radiates to the left shoulder, left neck, and 

down the left arm.  He denies any known exposure to anyone with COVID-19.





Review of Systems:


Review of Systems:


Constitutional:   Denies fever or chills. []


Eyes:   Denies change in visual acuity. []


HENT:   Denies nasal congestion or sore throat. [] 


Respiratory:   Denies cough or shortness of breath. [] 


Cardiovascular:   Denies palpitations or edema; see HPI. [] 


GI:   Denies abdominal pain, nausea, vomiting, or diarrhea. [] 


:  Denies dysuria. [] 


Musculoskeletal:   Denies back pain or joint pain. [] 


Integument:   Denies rash; see HPI [] 


Neurologic:   Denies headache, focal weakness or sensory changes. [] 


Psychiatric:  Denies depression or anxiety. []





Heart Score:


HEART Score for Chest Pain:  








HEART Score for Chest Pain Response (Comments) Value


 


History Moderately Suspicious 1


 


ECG Nonspecific Repolarizatio 1


 


Age >45 - < 65 1


 


Risk Factors >3 Risk Factors or Hx CAD 2


 


Troponin < Normal Limit 0


 


Total  5








Risk Factors:


Risk Factors:  DM, Current or recent (<one month) smoker, HTN, HLP, family 

history of CAD, obesity.


Risk Scores:


Score 0 - 3:  2.5% MACE over next 6 weeks - Discharge Home


Score 4 - 6:  20.3% MACE over next 6 weeks - Admit for Clinical Observation


Score 7 - 10:  72.7% MACE over next 6 weeks - Early Invasive Strategies





Current Medications:





Current Medications








 Medications


  (Trade)  Dose


 Ordered  Sig/Gwen  Start Time


 Stop Time Status Last Admin


Dose Admin


 


 Fentanyl Citrate


  (Fentanyl 2ml


 Vial)  50 mcg  1X  ONCE  20 13:15


 20 13:16 DC 20 13:17


50 MCG


 


 Ondansetron HCl


  (Zofran)  4 mg  1X  ONCE  20 13:15


 20 13:16 DC 20 13:17


4 MG











Allergies:


Allergies:





Allergies








Coded Allergies Type Severity Reaction Last Updated Verified


 


  albuterol Adverse Reaction Intermediate  20 Yes


 


  ibuprofen Adverse Reaction Intermediate Nausea and Vomiting 20 Yes











Physical Exam:


PE:





Constitutional: Well developed, well nourished, no acute distress, non-toxic 

appearance. []


HENT: Normocephalic, atraumatic, bilateral external ears normal, nose normal. []


Eyes: PERRLA, EOMI, conjunctiva normal, no discharge. [] 


Neck: Normal range of motion, no stridor. [] 


Cardiovascular:Heart rate regular rhythm, no murmur []


Lungs & Thorax:  Bilateral breath sounds coarse posteriorly, clear in upper 

fields bilaterally, no retractions, regular rate


Abdomen: soft, no tenderness


Skin: Warm, dry, no erythema, no rash. [] 


Back: No tenderness, no CVA tenderness. [] 


Extremities: No cyanosis, no clubbing, ROM intact, no edema. [] 


Neurologic: Alert and oriented X 3,  no focal deficits noted. []


Psychologic: Affect normal, judgement normal, mood normal. []





Current Patient Data:


Labs:





                                Laboratory Tests








Test


 20


13:00


 


White Blood Count


 5.4 x10^3/uL


(4.0-11.0)


 


Red Blood Count


 3.36 x10^6/uL


(4.30-5.70)  L


 


Hemoglobin


 8.9 g/dL


(13.0-17.5)  L


 


Hematocrit


 26.8 %


(39.0-53.0)  L


 


Mean Corpuscular Volume


 80 fL ()





 


Mean Corpuscular Hemoglobin 27 pg (25-35)  


 


Mean Corpuscular Hemoglobin


Concent 33 g/dL


(31-37)


 


Red Cell Distribution Width


 16.7 %


(11.5-14.5)  H


 


Platelet Count


 266 x10^3/uL


(140-400)


 


Neutrophils (%) (Auto) 61 % (31-73)  


 


Lymphocytes (%) (Auto) 20 % (24-48)  L


 


Monocytes (%) (Auto) 13 % (0-9)  H


 


Eosinophils (%) (Auto) 5 % (0-3)  H


 


Basophils (%) (Auto) 1 % (0-3)  


 


Neutrophils # (Auto)


 3.3 x10^3/uL


(1.8-7.7)


 


Lymphocytes # (Auto)


 1.1 x10^3/uL


(1.0-4.8)


 


Monocytes # (Auto)


 0.7 x10^3/uL


(0.0-1.1)


 


Eosinophils # (Auto)


 0.2 x10^3/uL


(0.0-0.7)


 


Basophils # (Auto)


 0.1 x10^3/uL


(0.0-0.2)


 


Prothrombin Time


 13.8 SEC


(11.7-14.0)


 


Prothrombin Time INR 1.1 (0.8-1.1)  


 


Activated Partial


Thromboplast Time 33 SEC (24-38)








                                Laboratory Tests


20 13:00








Vital Signs:





                                   Vital Signs








  Date Time  Temp Pulse Resp B/P (MAP) Pulse Ox O2 Delivery O2 Flow Rate FiO2


 


20 12:30 98.3 84 20 122/78 (93) 99 Room Air  





 98.3       











EKG:


EK-sinus rhythm with PVCs, left atrial abnormality, rate 83, no STEMI, read by

 Dr. Tafoya []





Radiology/Procedures:


Radiology/Procedures:


PROCEDURE: CHEST AP ONLY





CHEST AP ONLY


 


History: Reason: chest pain / Spl. Instructions:  / History: 


 


Comparison: 2020


 


Findings:


Patchy bibasilar opacities, unchanged. No pleural effusion. No 


pneumothorax. Left-sided ICD, unchanged. Normal heart size.


 


Impression: 


1.  Patchy bibasilar opacities, likely atelectasis.


 []





Course & Med Decision Making:


Course & Med Decision Making


Pertinent Labs and Imaging studies reviewed. (See chart for details)








1408-spoke with Dr. Denney who is the admitting physician, and care was assumed 

following discussion of patient.  Will admit the patient for chest pain and a

nemia to CBC


Patient's vital signs stable. Patient remains afebrile, appears nontoxic, 

respirations even and unlabored. Patient's case and plan of care also discussed 

with Dr. Tafoya


[]





Dannielle Disclaimer:


Dragon Disclaimer:


This electronic medical record was generated, in whole or in part, using a voice

 recognition dictation system.





Departure


Departure


Impression:  


   Primary Impression:  


   Chest pain


   Qualified Codes:  R07.9 - Chest pain, unspecified


   Additional Impression:  


   Anemia


   Qualified Codes:  D64.9 - Anemia, unspecified


Disposition:   ADMITTED AS INPATIENT


Admitting Physician:  WILFRED BRAUN)


Condition:  GOOD


Referrals:  


NO PCP (PCP)





Justicifation of Admission Dx:


Justifications for Admission:


Justification of Admission Dx:  Yes


Sepsis:  Bacteremia











GAVINO SANZ APRN        2020 13:53

## 2020-07-09 NOTE — EKG
[facility]

              [address] [place] [address]

Test Date:    2020               Test Time:    12:38:37

Pat Name:     JOB RIOS           Department:   

Patient ID:   [id number]           Room:          

Gender:       M                        Technician:   

:          1960               Requested By: GAVINO SANZ

Order Number: [id number]           Reading MD:     

                                 Measurements

Intervals                              Axis          

Rate:         83                       P:            52

DC:           148                      QRS:          -16

QRSD:         142                      T:            154

QT:           408                                    

QTc:          486                                    

                           Interpretive Statements

SINUS RHYTHM

VENTRICULAR PREMATURE COMPLEX(ES)

LEFT ATRIAL ABNORMALITY

CONSIDER WPW, TYPE B

LEFTWARD AXIS

QRS(T) CONTOUR ABNORMALITY

CONSIDER ANTEROSEPTAL MYOCARDIAL DAMAGE

ST & T ABNORMALITY, CONSIDER

LATERAL ISCHEMIA OR LEFT VENTRICULAR STRAIN

ABNORMAL ECG

RI6.02

No previous ECG available for comparison

## 2020-07-10 VITALS — DIASTOLIC BLOOD PRESSURE: 44 MMHG | SYSTOLIC BLOOD PRESSURE: 98 MMHG

## 2020-07-10 VITALS — DIASTOLIC BLOOD PRESSURE: 55 MMHG | SYSTOLIC BLOOD PRESSURE: 98 MMHG

## 2020-07-10 VITALS — DIASTOLIC BLOOD PRESSURE: 66 MMHG | SYSTOLIC BLOOD PRESSURE: 110 MMHG

## 2020-07-10 VITALS — DIASTOLIC BLOOD PRESSURE: 52 MMHG | SYSTOLIC BLOOD PRESSURE: 95 MMHG

## 2020-07-10 LAB
ANION GAP SERPL CALC-SCNC: 6 MMOL/L (ref 6–14)
BASOPHILS # BLD AUTO: 0.1 X10^3/UL (ref 0–0.2)
BASOPHILS NFR BLD: 2 % (ref 0–3)
BUN SERPL-MCNC: 10 MG/DL (ref 8–26)
CALCIUM SERPL-MCNC: 8 MG/DL (ref 8.5–10.1)
CHLORIDE SERPL-SCNC: 103 MMOL/L (ref 98–107)
CHOLEST SERPL-MCNC: 136 MG/DL (ref 0–200)
CHOLEST/HDLC SERPL: 5.2 {RATIO}
CO2 SERPL-SCNC: 27 MMOL/L (ref 21–32)
CREAT SERPL-MCNC: 1.1 MG/DL (ref 0.7–1.3)
EOSINOPHIL NFR BLD: 0.3 X10^3/UL (ref 0–0.7)
EOSINOPHIL NFR BLD: 9 % (ref 0–3)
ERYTHROCYTE [DISTWIDTH] IN BLOOD BY AUTOMATED COUNT: 16.8 % (ref 11.5–14.5)
GFR SERPLBLD BASED ON 1.73 SQ M-ARVRAT: 68.3 ML/MIN
GLUCOSE SERPL-MCNC: 99 MG/DL (ref 70–99)
HCT VFR BLD CALC: 26.8 % (ref 39–53)
HDLC SERPL-MCNC: 26 MG/DL (ref 40–60)
HGB BLD-MCNC: 9 G/DL (ref 13–17.5)
LDLC: 79 MG/DL (ref 0–100)
LYMPHOCYTES # BLD: 1.3 X10^3/UL (ref 1–4.8)
LYMPHOCYTES NFR BLD AUTO: 37 % (ref 24–48)
MCH RBC QN AUTO: 26 PG (ref 25–35)
MCHC RBC AUTO-ENTMCNC: 34 G/DL (ref 31–37)
MCV RBC AUTO: 78 FL (ref 79–100)
MONO #: 0.6 X10^3/UL (ref 0–1.1)
MONOCYTES NFR BLD: 17 % (ref 0–9)
NEUT #: 1.2 X10^3/UL (ref 1.8–7.7)
NEUTROPHILS NFR BLD AUTO: 35 % (ref 31–73)
PLATELET # BLD AUTO: 264 X10^3/UL (ref 140–400)
POTASSIUM SERPL-SCNC: 4.1 MMOL/L (ref 3.5–5.1)
RBC # BLD AUTO: 3.42 X10^6/UL (ref 4.3–5.7)
SODIUM SERPL-SCNC: 136 MMOL/L (ref 136–145)
TRIGL SERPL-MCNC: 154 MG/DL (ref 0–150)
VLDLC: 31 MG/DL (ref 0–40)
WBC # BLD AUTO: 3.5 X10^3/UL (ref 4–11)

## 2020-07-10 RX ADMIN — IPRATROPIUM BROMIDE SCH MG: 0.5 SOLUTION RESPIRATORY (INHALATION) at 11:35

## 2020-07-10 RX ADMIN — FENTANYL CITRATE PRN MCG: 50 INJECTION INTRAMUSCULAR; INTRAVENOUS at 12:58

## 2020-07-10 RX ADMIN — FENTANYL CITRATE PRN MCG: 50 INJECTION INTRAMUSCULAR; INTRAVENOUS at 06:07

## 2020-07-10 RX ADMIN — PREGABALIN SCH MG: 75 CAPSULE ORAL at 09:38

## 2020-07-10 RX ADMIN — FENTANYL CITRATE PRN MCG: 50 INJECTION INTRAMUSCULAR; INTRAVENOUS at 09:42

## 2020-07-10 RX ADMIN — FENTANYL CITRATE PRN MCG: 50 INJECTION INTRAMUSCULAR; INTRAVENOUS at 01:57

## 2020-07-10 NOTE — PDOC
ECHO PACHECO APRN 7/10/20 1150:


CARDIO Progress Notes


Date and Time


Date of Service


7/10/2020


Time of Evaluation


0930





Subjective


Subjective:  No Chest Pain, No shortness of breath, No Palpitations, Other 

(walking around in thehallway pacing without difficulty)





Vitals


Vitals





Vital Signs








  Date Time  Temp Pulse Resp B/P (MAP) Pulse Ox O2 Delivery O2 Flow Rate FiO2


 


7/10/20 11:00 97.8 83 14 110/66 (81) 99 Room Air  





 97.8       








Weight


Weight [ ]





Input and Output


Intake and Output











Intake and Output 


 


 7/10/20





 07:00


 


Intake Total 724 ml


 


Balance 724 ml


 


 


 


Intake Oral 724 ml


 


# Voids 3











Laboratory


Labs





Laboratory Tests








Test


 7/9/20


13:00 7/9/20


13:24 7/9/20


18:00 7/10/20


00:20


 


White Blood Count


 5.4 x10^3/uL


(4.0-11.0) 


 


 





 


Red Blood Count


 3.36 x10^6/uL


(4.30-5.70) 


 


 





 


Hemoglobin


 8.9 g/dL


(13.0-17.5) 


 


 





 


Hematocrit


 26.8 %


(39.0-53.0) 


 


 





 


Mean Corpuscular Volume 80 fL ()    


 


Mean Corpuscular Hemoglobin 27 pg (25-35)    


 


Mean Corpuscular Hemoglobin


Concent 33 g/dL


(31-37) 


 


 





 


Red Cell Distribution Width


 16.7 %


(11.5-14.5) 


 


 





 


Platelet Count


 266 x10^3/uL


(140-400) 


 


 





 


Neutrophils (%) (Auto) 61 % (31-73)    


 


Lymphocytes (%) (Auto) 20 % (24-48)    


 


Monocytes (%) (Auto) 13 % (0-9)    


 


Eosinophils (%) (Auto) 5 % (0-3)    


 


Basophils (%) (Auto) 1 % (0-3)    


 


Neutrophils # (Auto)


 3.3 x10^3/uL


(1.8-7.7) 


 


 





 


Lymphocytes # (Auto)


 1.1 x10^3/uL


(1.0-4.8) 


 


 





 


Monocytes # (Auto)


 0.7 x10^3/uL


(0.0-1.1) 


 


 





 


Eosinophils # (Auto)


 0.2 x10^3/uL


(0.0-0.7) 


 


 





 


Basophils # (Auto)


 0.1 x10^3/uL


(0.0-0.2) 


 


 





 


Prothrombin Time


 13.8 SEC


(11.7-14.0) 


 


 





 


Prothromb Time International


Ratio 1.1 (0.8-1.1) 


 


 


 





 


Activated Partial


Thromboplast Time 33 SEC (24-38) 


 


 


 





 


D-Dimer (Camryn)


 0.63 ug/mlFEU


(0.00-0.50) 


 


 





 


Sodium Level


 141 mmol/L


(136-145) 


 


 





 


Potassium Level


 4.0 mmol/L


(3.5-5.1) 


 


 





 


Chloride Level


 105 mmol/L


() 


 


 





 


Carbon Dioxide Level


 30 mmol/L


(21-32) 


 


 





 


Anion Gap 6 (6-14)    


 


Blood Urea Nitrogen


 10 mg/dL


(8-26) 


 


 





 


Creatinine


 1.2 mg/dL


(0.7-1.3) 


 


 





 


Estimated GFR


(Cockcroft-Gault) 61.8 


 


 


 





 


BUN/Creatinine Ratio 8 (6-20)    


 


Glucose Level


 91 mg/dL


(70-99) 


 


 





 


Calcium Level


 8.1 mg/dL


(8.5-10.1) 


 


 





 


Magnesium Level


 2.0 mg/dL


(1.8-2.4) 


 


 





 


Total Bilirubin


 0.3 mg/dL


(0.2-1.0) 


 


 





 


Aspartate Amino Transf


(AST/SGOT) 16 U/L (15-37) 


 


 


 





 


Alanine Aminotransferase


(ALT/SGPT) 19 U/L (16-63) 


 


 


 





 


Alkaline Phosphatase


 88 U/L


() 


 


 





 


Creatine Kinase


 125 U/L


() 


 


 





 


Creatine Kinase MB (Mass)


 0.9 ng/mL


(0.0-3.6) 


 


 





 


Creatine Kinase MB Relative


Index 0.7 % (0-4) 


 


 


 





 


Troponin I Quantitative


 < 0.017 ng/mL


(0.000-0.055) 


 < 0.017 ng/mL


(0.000-0.055) < 0.017 ng/mL


(0.000-0.055)


 


NT-Pro-B-Type Natriuretic


Peptide 5044 pg/mL


(0-124) 


 


 





 


Total Protein


 6.8 g/dL


(6.4-8.2) 


 


 





 


Albumin


 3.2 g/dL


(3.4-5.0) 


 


 





 


Albumin/Globulin Ratio 0.9 (1.0-1.7)    


 


Lipase


 94 U/L


() 


 


 





 


Urine Collection Type  Unknown   


 


Urine Color  Yellow   


 


Urine Clarity  Clear   


 


Urine pH  7.0 (<5.0-8.0)   


 


Urine Specific Gravity


 


 1.010


(1.000-1.030) 


 





 


Urine Protein


 


 Negative mg/dL


(NEG-TRACE) 


 





 


Urine Glucose (UA)


 


 Negative mg/dL


(NEG) 


 





 


Urine Ketones (Stick)


 


 Negative mg/dL


(NEG) 


 





 


Urine Blood  Negative (NEG)   


 


Urine Nitrite  Negative (NEG)   


 


Urine Bilirubin  Negative (NEG)   


 


Urine Urobilinogen Dipstick


 


 1.0 mg/dL (0.2


mg/dL) 


 





 


Urine Leukocyte Esterase  Negative (NEG)   


 


Urine RBC  1-2 /HPF (0-2)   


 


Urine WBC  0 /HPF (0-4)   


 


Urine Amorphous Sediment  Present /HPF   


 


Urine Bacteria  0 /HPF (0-FEW)   


 


Urine Opiates Screen  Pos (NEG)   


 


Urine Methadone Screen  Neg (NEG)   


 


Urine Barbiturates  Neg (NEG)   


 


Urine Phencyclidine Screen  Neg (NEG)   


 


Urine


Amphetamine/Methamphetamine 


 Neg (NEG) 


 


 





 


Urine Benzodiazepines Screen  Neg (NEG)   


 


Urine Cocaine Screen  Neg (NEG)   


 


Urine Cannabinoids Screen  Neg (NEG)   


 


Urine Ethyl Alcohol  Neg (NEG)   


 


Iron Level


 


 


 24 ug/dL


() 





 


Total Iron Binding Capacity


 


 


 345 ug/dL


(250-450) 





 


Iron Saturation   7 % (15-34)  


 


Triglycerides Level


 


 


 


 154 mg/dL


(0-150)


 


Cholesterol Level


 


 


 


 136 mg/dL


(0-200)


 


LDL Cholesterol, Calculated


 


 


 


 79 mg/dL


(0-100)


 


VLDL Cholesterol, Calculated


 


 


 


 31 mg/dL


(0-40)


 


Non-HDL Cholesterol Calculated


 


 


 


 110 mg/dL


(0-129)


 


HDL Cholesterol


 


 


 


 26 mg/dL


(40-60)


 


Cholesterol/HDL Ratio    5.2 


 


Test


 7/10/20


05:00 


 


 





 


White Blood Count


 3.5 x10^3/uL


(4.0-11.0) 


 


 





 


Red Blood Count


 3.42 x10^6/uL


(4.30-5.70) 


 


 





 


Hemoglobin


 9.0 g/dL


(13.0-17.5) 


 


 





 


Hematocrit


 26.8 %


(39.0-53.0) 


 


 





 


Mean Corpuscular Volume 78 fL ()    


 


Mean Corpuscular Hemoglobin 26 pg (25-35)    


 


Mean Corpuscular Hemoglobin


Concent 34 g/dL


(31-37) 


 


 





 


Red Cell Distribution Width


 16.8 %


(11.5-14.5) 


 


 





 


Platelet Count


 264 x10^3/uL


(140-400) 


 


 





 


Neutrophils (%) (Auto) 35 % (31-73)    


 


Lymphocytes (%) (Auto) 37 % (24-48)    


 


Monocytes (%) (Auto) 17 % (0-9)    


 


Eosinophils (%) (Auto) 9 % (0-3)    


 


Basophils (%) (Auto) 2 % (0-3)    


 


Neutrophils # (Auto)


 1.2 x10^3/uL


(1.8-7.7) 


 


 





 


Lymphocytes # (Auto)


 1.3 x10^3/uL


(1.0-4.8) 


 


 





 


Monocytes # (Auto)


 0.6 x10^3/uL


(0.0-1.1) 


 


 





 


Eosinophils # (Auto)


 0.3 x10^3/uL


(0.0-0.7) 


 


 





 


Basophils # (Auto)


 0.1 x10^3/uL


(0.0-0.2) 


 


 





 


Sodium Level


 136 mmol/L


(136-145) 


 


 





 


Potassium Level


 4.1 mmol/L


(3.5-5.1) 


 


 





 


Chloride Level


 103 mmol/L


() 


 


 





 


Carbon Dioxide Level


 27 mmol/L


(21-32) 


 


 





 


Anion Gap 6 (6-14)    


 


Blood Urea Nitrogen


 10 mg/dL


(8-26) 


 


 





 


Creatinine


 1.1 mg/dL


(0.7-1.3) 


 


 





 


Estimated GFR


(Cockcroft-Gault) 68.3 


 


 


 





 


Glucose Level


 99 mg/dL


(70-99) 


 


 





 


Calcium Level


 8.0 mg/dL


(8.5-10.1) 


 


 














Physical Exam


HEENT:  Neck Supple W Full Motion


Chest:  Symmetric


LUNGS:  Clear to Auscultation


Heart:  S1S2, RRR (SR)


Abdomen:  Soft N/T


Extremities:  No Calf Tenderness


Neurology:  alert, oriented, follow commands





Assessment


Assessment


1. Chest pain: no further recurrence.trops nml. EKG revealed LBBB which looks 

new


2. ICM/chronic systolic CHF: appears compensated. EF at 30-35%


3. CAD; multiple stents in the past. At least 12 stents reported. see PCI report

above


4. Hx of VT: no episodes. not on antiarrhythmic


5. AICD in situ: medtronic. Interrogation revealed no significant arrhythmias 

that would correlate with his symptoms.. No treatments. on VVI mode with low 

setting in the 40


7. HTN: controlled


8. HLP: not on goal


9. Hx of meth use


10. Tobaccoism with likely COPD: not wanting quit


11. Hx of anxiety


12. Hx of Drug seeking behavior


13. Microcytic anemia: Hgb 9





Recommendations


1. Continue with ASA and plavix. Pt did well overnight, ambulatory without 

difficulty and troponin elevation and no significant ectopies. VSS. He needs to 

smoking but he already indicated that he is not. He would benefit from either 

ranozaline and imdur but his BP would not support


2. Continue BB and secondary prevention measures


3. Follow up with his cardiologist in [place] and consider for outpt stress 

test. May DC per cardiac standpoint





Justicifation of Admission Dx:


Justifications for Admission:


Justification of Admission Dx:  Yes


Sepsis:  Bacteremia





YARED DOMINGUEZ MD 7/10/20 1331:


CARDIO Progress Notes


Assessment


Assessment


Patient seen and examined


Discussed with our nurse practitioner and I agree with his assessment and plan.


Chest pain: no further recurrence.trops nml.  Continuing medical treatment.


ICM/chronic systolic CHF: appears compensated. EF at 30-35%


CAD; multiple stents in the past. At least 12 stents reported. see PCI report 

above


AICD in situ: medtronic. Interrogation revealed no significant arrhythmias that 

would correlate with his symptoms.. No treatments. on VVI mode with low setting 

in the 40


HTN: controlled


HLP: Continue medical treatment and monitor.





 Justicifation of Admission Dx: 











ECHO PACHECO          Jul 10, 2020 11:50


YARED DOMINGUEZ MD            Jul 10, 2020 13:31

## 2020-07-10 NOTE — NUR
SS following for discharge planning. SS reviewed pt chart and discussed with pt RN. Pt is 
from home and is currently on room air. SS will continue to follow for discharge planning.

## 2020-07-10 NOTE — PDOC3
Discharge Summary


Date of Admission:  Jul 9, 2020


Date of Discharge:  Jul 10, 2020


Follow-Up:  3-5 days


Admitting Diagnosis comment:


discharge dx =================================








Assessment/Plan


A/P:


Chest pain - high risk ACS given his history and lateral TWI, will trend 

troponins, telemetry, consult cardiology


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

tamine use historically.  Fluid dynamics seems stable.


CAD - s/p x4 cardiac catherizations. multiple stents in the past. At least 12 

stents reported. 


VT s/p AICD - medtronic. Will consult cardiology. needs device interrogation


Left ventricle systolic function is moderately impaired. EF 30-35%.  severe 

global hypokinesis of the left ventricle.


Tissue Doppler imaging reveals moderate left ventricular diastolic 

dysfunction.pacemaker lead in the right ventricle. moderate mitral 

regurgitation. Doppler and Color Flow revealed mild tricuspid regurgitation. 

There is moderate pulmonary hypertension


HTN - cont meds


HLD - cont meds


Hx of IV meth use - sober for 5 months. Tox screen negative and sees a counselor

through [facility]


Tobaccoism -counseled on cessation //states he will need to start smoking as 

soon as he leaves


Anxiety/agitation -calmed with verbal counseling


History of Drug seeking behavior -advised with his substance abuse history is 

high risk and should not be given an opioid prescription on discharge and to 

minimize opioids while inpatient


LBBB // new


intermittent hypotension 


microcytic anemia











FEN - Cardiac


PPX - Lovenox


FULL CODE


Dispo - inpatient for high risk ACS


PLAN


CVC BED


Cardiology consult


trend troponin i neg x 2 


echo 


lovenox sq bid


fe panel


Follow up with his cardiologist in [place] and consider for outpt stress test. 

May DC per cardiac standpoint/ high risk of death with meth abuse

















38 min pt exam d/c planning , chart review, > 50% of time spent with exam, chart

review, pt care coordination


FINAL DIAGNOSIS


Problems


Medical Problems:


(1) Anemia


Status: Acute  





(2) Chest pain


Status: Acute  








Brief Hospital Course


Mr. Bishop  is a 60 old [sex] who presented with [ chest pain ]


CONDITION AT DISCHARGE:  Comment (guarded prognosis)


Discharge Medications





Current Medications


Fentanyl Citrate (Fentanyl 2ml Vial) 50 mcg 1X  ONCE IV  Last administered on 

7/9/20at 13:17;  Start 7/9/20 at 13:15;  Stop 7/9/20 at 13:16;  Status DC


Ondansetron HCl (Zofran) 4 mg 1X  ONCE IV  Last administered on 7/9/20at 13:17; 

Start 7/9/20 at 13:15;  Stop 7/9/20 at 13:16;  Status DC


Fentanyl Citrate (Fentanyl 2ml Vial) 50 mcg PRN Q1HR  PRN IV PAIN Last 

administered on 7/10/20at 12:58;  Start 7/9/20 at 14:45;  Stop 7/10/20 at 14:44;

 Status DC


Aspirin (Ecotrin) 81 mg DAILY PO  Last administered on 7/10/20at 09:37;  Start 

7/10/20 at 09:00


Atorvastatin Calcium (Lipitor) 10 mg HS PO  Last administered on 7/9/20at 21:04;

 Start 7/9/20 at 21:00


Clopidogrel Bisulfate (Plavix) 75 mg DAILY PO  Last administered on 7/10/20at 

09:37;  Start 7/10/20 at 09:00


Metoprolol Succinate (Toprol Xl) 12.5 mg DAILY PO ;  Start 7/10/20 at 09:00


Nitroglycerin (Nitrostat) 0.4 mg PRN Q5MIN  PRN SL CHEST PAIN;  Start 7/9/20 at 

15:15


Ipratropium Bromide (Atrovent) 0.5 mg RTQID NEB ;  Start 7/9/20 at 16:00


Pregabalin (Lyrica) 300 mg BID PO  Last administered on 7/10/20at 09:38;  Start 

7/9/20 at 21:00


Pantoprazole Sodium (Protonix) 40 mg 1X  ONCE PO  Last administered on 7/9/20at 

16:15;  Start 7/9/20 at 15:15;  Stop 7/9/20 at 15:16;  Status DC


Pantoprazole Sodium (Protonix) 40 mg DAILYAC PO  Last administered on 7/10/20at 

09:38;  Start 7/10/20 at 07:30


Codeine Sulfate (Codeine) 15 mg PRN Q6HRS  PRN PO PAIN Last administered on 

7/10/20at 15:25;  Start 7/9/20 at 15:15


Enoxaparin Sodium (Lovenox 80mg Syringe) 70 mg 1X  ONCE SQ ;  Start 7/9/20 at 

16:45;  Stop 7/9/20 at 16:46;  Status DC


Enoxaparin Sodium (Lovenox 80mg Syringe) 70 mg Q12HR SQ ;  Start 7/10/20 at 

09:00


Info (Anti-Coagulation Monitoring By Pharmacy) 1 each PRN DAILY  PRN [facility] SEE 

COMMENTS Last administered on 7/10/20at 14:05;  Start 7/9/20 at 17:15





Active Scripts


Active


Reported


Spiriva (Tiotropium Bromide) 18 Mcg Cap.w.dev 1 Cap IH DAILY


Atorvastatin Calcium 10 Mg Tablet 10 Mg PO HS


Metoprolol Succinate ( Xl ) (Metoprolol Succinate) 25 Mg Tab.er.24h 12.5 Mg PO 

DAILY


Spiriva (Tiotropium Melvin) 18 Mcg Cap.w.dev 1 Cap IH DAILY


Ms Contin (Morphine Sulfate) 30 Mg Tablet.er 1 Tab PO BID PRN


NITROGLYCERIN SubLingual (Nitroglycerin) 0.4 Mg Tab.subl 0.4 Mg SL PRN Q5MIN PRN


Lyrica (Pregabalin) 300 Mg Capsule 1 Cap PO BID


Plavix (Clopidogrel Bisulfate) 75 Mg Tablet 75 Mg PO DAILY


Aspir 81 (Aspirin) 81 Mg Tablet.dr 1 Tab PO DAILY


Vital Signs





Vital Signs








  Date Time  Temp Pulse Resp B/P (MAP) Pulse Ox O2 Delivery O2 Flow Rate FiO2


 


7/10/20 15:25     99 Room Air  


 


7/10/20 15:00 97.8 80 14 98/44 (62)    





 97.8       








Labs





Laboratory Tests








Test


 7/9/20


13:00 7/9/20


13:24 7/9/20


18:00 7/10/20


00:20


 


White Blood Count


 5.4 x10^3/uL


(4.0-11.0) 


 


 





 


Red Blood Count


 3.36 x10^6/uL


(4.30-5.70) 


 


 





 


Hemoglobin


 8.9 g/dL


(13.0-17.5) 


 


 





 


Hematocrit


 26.8 %


(39.0-53.0) 


 


 





 


Mean Corpuscular Volume 80 fL ()    


 


Mean Corpuscular Hemoglobin 27 pg (25-35)    


 


Mean Corpuscular Hemoglobin


Concent 33 g/dL


(31-37) 


 


 





 


Red Cell Distribution Width


 16.7 %


(11.5-14.5) 


 


 





 


Platelet Count


 266 x10^3/uL


(140-400) 


 


 





 


Neutrophils (%) (Auto) 61 % (31-73)    


 


Lymphocytes (%) (Auto) 20 % (24-48)    


 


Monocytes (%) (Auto) 13 % (0-9)    


 


Eosinophils (%) (Auto) 5 % (0-3)    


 


Basophils (%) (Auto) 1 % (0-3)    


 


Neutrophils # (Auto)


 3.3 x10^3/uL


(1.8-7.7) 


 


 





 


Lymphocytes # (Auto)


 1.1 x10^3/uL


(1.0-4.8) 


 


 





 


Monocytes # (Auto)


 0.7 x10^3/uL


(0.0-1.1) 


 


 





 


Eosinophils # (Auto)


 0.2 x10^3/uL


(0.0-0.7) 


 


 





 


Basophils # (Auto)


 0.1 x10^3/uL


(0.0-0.2) 


 


 





 


Prothrombin Time


 13.8 SEC


(11.7-14.0) 


 


 





 


Prothromb Time International


Ratio 1.1 (0.8-1.1) 


 


 


 





 


Activated Partial


Thromboplast Time 33 SEC (24-38) 


 


 


 





 


D-Dimer (Camryn)


 0.63 ug/mlFEU


(0.00-0.50) 


 


 





 


Sodium Level


 141 mmol/L


(136-145) 


 


 





 


Potassium Level


 4.0 mmol/L


(3.5-5.1) 


 


 





 


Chloride Level


 105 mmol/L


() 


 


 





 


Carbon Dioxide Level


 30 mmol/L


(21-32) 


 


 





 


Anion Gap 6 (6-14)    


 


Blood Urea Nitrogen


 10 mg/dL


(8-26) 


 


 





 


Creatinine


 1.2 mg/dL


(0.7-1.3) 


 


 





 


Estimated GFR


(Cockcroft-Gault) 61.8 


 


 


 





 


BUN/Creatinine Ratio 8 (6-20)    


 


Glucose Level


 91 mg/dL


(70-99) 


 


 





 


Calcium Level


 8.1 mg/dL


(8.5-10.1) 


 


 





 


Magnesium Level


 2.0 mg/dL


(1.8-2.4) 


 


 





 


Total Bilirubin


 0.3 mg/dL


(0.2-1.0) 


 


 





 


Aspartate Amino Transf


(AST/SGOT) 16 U/L (15-37) 


 


 


 





 


Alanine Aminotransferase


(ALT/SGPT) 19 U/L (16-63) 


 


 


 





 


Alkaline Phosphatase


 88 U/L


() 


 


 





 


Creatine Kinase


 125 U/L


() 


 


 





 


Creatine Kinase MB (Mass)


 0.9 ng/mL


(0.0-3.6) 


 


 





 


Creatine Kinase MB Relative


Index 0.7 % (0-4) 


 


 


 





 


Troponin I Quantitative


 < 0.017 ng/mL


(0.000-0.055) 


 < 0.017 ng/mL


(0.000-0.055) < 0.017 ng/mL


(0.000-0.055)


 


NT-Pro-B-Type Natriuretic


Peptide 5044 pg/mL


(0-124) 


 


 





 


Total Protein


 6.8 g/dL


(6.4-8.2) 


 


 





 


Albumin


 3.2 g/dL


(3.4-5.0) 


 


 





 


Albumin/Globulin Ratio 0.9 (1.0-1.7)    


 


Lipase


 94 U/L


() 


 


 





 


Urine Collection Type  Unknown   


 


Urine Color  Yellow   


 


Urine Clarity  Clear   


 


Urine pH  7.0 (<5.0-8.0)   


 


Urine Specific Gravity


 


 1.010


(1.000-1.030) 


 





 


Urine Protein


 


 Negative mg/dL


(NEG-TRACE) 


 





 


Urine Glucose (UA)


 


 Negative mg/dL


(NEG) 


 





 


Urine Ketones (Stick)


 


 Negative mg/dL


(NEG) 


 





 


Urine Blood  Negative (NEG)   


 


Urine Nitrite  Negative (NEG)   


 


Urine Bilirubin  Negative (NEG)   


 


Urine Urobilinogen Dipstick


 


 1.0 mg/dL (0.2


mg/dL) 


 





 


Urine Leukocyte Esterase  Negative (NEG)   


 


Urine RBC  1-2 /HPF (0-2)   


 


Urine WBC  0 /HPF (0-4)   


 


Urine Amorphous Sediment  Present /HPF   


 


Urine Bacteria  0 /HPF (0-FEW)   


 


Urine Opiates Screen  Pos (NEG)   


 


Urine Methadone Screen  Neg (NEG)   


 


Urine Barbiturates  Neg (NEG)   


 


Urine Phencyclidine Screen  Neg (NEG)   


 


Urine


Amphetamine/Methamphetamine 


 Neg (NEG) 


 


 





 


Urine Benzodiazepines Screen  Neg (NEG)   


 


Urine Cocaine Screen  Neg (NEG)   


 


Urine Cannabinoids Screen  Neg (NEG)   


 


Urine Ethyl Alcohol  Neg (NEG)   


 


Iron Level


 


 


 24 ug/dL


() 





 


Total Iron Binding Capacity


 


 


 345 ug/dL


(250-450) 





 


Iron Saturation   7 % (15-34)  


 


Triglycerides Level


 


 


 


 154 mg/dL


(0-150)


 


Cholesterol Level


 


 


 


 136 mg/dL


(0-200)


 


LDL Cholesterol, Calculated


 


 


 


 79 mg/dL


(0-100)


 


VLDL Cholesterol, Calculated


 


 


 


 31 mg/dL


(0-40)


 


Non-HDL Cholesterol Calculated


 


 


 


 110 mg/dL


(0-129)


 


HDL Cholesterol


 


 


 


 26 mg/dL


(40-60)


 


Cholesterol/HDL Ratio    5.2 


 


Test


 7/10/20


05:00 


 


 





 


White Blood Count


 3.5 x10^3/uL


(4.0-11.0) 


 


 





 


Red Blood Count


 3.42 x10^6/uL


(4.30-5.70) 


 


 





 


Hemoglobin


 9.0 g/dL


(13.0-17.5) 


 


 





 


Hematocrit


 26.8 %


(39.0-53.0) 


 


 





 


Mean Corpuscular Volume 78 fL ()    


 


Mean Corpuscular Hemoglobin 26 pg (25-35)    


 


Mean Corpuscular Hemoglobin


Concent 34 g/dL


(31-37) 


 


 





 


Red Cell Distribution Width


 16.8 %


(11.5-14.5) 


 


 





 


Platelet Count


 264 x10^3/uL


(140-400) 


 


 





 


Neutrophils (%) (Auto) 35 % (31-73)    


 


Lymphocytes (%) (Auto) 37 % (24-48)    


 


Monocytes (%) (Auto) 17 % (0-9)    


 


Eosinophils (%) (Auto) 9 % (0-3)    


 


Basophils (%) (Auto) 2 % (0-3)    


 


Neutrophils # (Auto)


 1.2 x10^3/uL


(1.8-7.7) 


 


 





 


Lymphocytes # (Auto)


 1.3 x10^3/uL


(1.0-4.8) 


 


 





 


Monocytes # (Auto)


 0.6 x10^3/uL


(0.0-1.1) 


 


 





 


Eosinophils # (Auto)


 0.3 x10^3/uL


(0.0-0.7) 


 


 





 


Basophils # (Auto)


 0.1 x10^3/uL


(0.0-0.2) 


 


 





 


Sodium Level


 136 mmol/L


(136-145) 


 


 





 


Potassium Level


 4.1 mmol/L


(3.5-5.1) 


 


 





 


Chloride Level


 103 mmol/L


() 


 


 





 


Carbon Dioxide Level


 27 mmol/L


(21-32) 


 


 





 


Anion Gap 6 (6-14)    


 


Blood Urea Nitrogen


 10 mg/dL


(8-26) 


 


 





 


Creatinine


 1.1 mg/dL


(0.7-1.3) 


 


 





 


Estimated GFR


(Cockcroft-Gault) 68.3 


 


 


 





 


Glucose Level


 99 mg/dL


(70-99) 


 


 





 


Calcium Level


 8.0 mg/dL


(8.5-10.1) 


 


 





 


Iron Level


 16 ug/dL


() 


 


 





 


Total Iron Binding Capacity


 309 ug/dL


(250-450) 


 


 





 


Iron Saturation 5 % (15-34)    








Laboratory Tests








Test


 7/9/20


18:00 7/10/20


00:20 7/10/20


05:00


 


Iron Level


 24 ug/dL


() 


 16 ug/dL


()


 


Total Iron Binding Capacity


 345 ug/dL


(250-450) 


 309 ug/dL


(250-450)


 


Iron Saturation 7 % (15-34)   5 % (15-34) 


 


Troponin I Quantitative


 < 0.017 ng/mL


(0.000-0.055) < 0.017 ng/mL


(0.000-0.055) 





 


Triglycerides Level


 


 154 mg/dL


(0-150) 





 


Cholesterol Level


 


 136 mg/dL


(0-200) 





 


LDL Cholesterol, Calculated


 


 79 mg/dL


(0-100) 





 


VLDL Cholesterol, Calculated


 


 31 mg/dL


(0-40) 





 


Non-HDL Cholesterol Calculated


 


 110 mg/dL


(0-129) 





 


HDL Cholesterol


 


 26 mg/dL


(40-60) 





 


Cholesterol/HDL Ratio  5.2  


 


White Blood Count


 


 


 3.5 x10^3/uL


(4.0-11.0)


 


Red Blood Count


 


 


 3.42 x10^6/uL


(4.30-5.70)


 


Hemoglobin


 


 


 9.0 g/dL


(13.0-17.5)


 


Hematocrit


 


 


 26.8 %


(39.0-53.0)


 


Mean Corpuscular Volume   78 fL () 


 


Mean Corpuscular Hemoglobin   26 pg (25-35) 


 


Mean Corpuscular Hemoglobin


Concent 


 


 34 g/dL


(31-37)


 


Red Cell Distribution Width


 


 


 16.8 %


(11.5-14.5)


 


Platelet Count


 


 


 264 x10^3/uL


(140-400)


 


Neutrophils (%) (Auto)   35 % (31-73) 


 


Lymphocytes (%) (Auto)   37 % (24-48) 


 


Monocytes (%) (Auto)   17 % (0-9) 


 


Eosinophils (%) (Auto)   9 % (0-3) 


 


Basophils (%) (Auto)   2 % (0-3) 


 


Neutrophils # (Auto)


 


 


 1.2 x10^3/uL


(1.8-7.7)


 


Lymphocytes # (Auto)


 


 


 1.3 x10^3/uL


(1.0-4.8)


 


Monocytes # (Auto)


 


 


 0.6 x10^3/uL


(0.0-1.1)


 


Eosinophils # (Auto)


 


 


 0.3 x10^3/uL


(0.0-0.7)


 


Basophils # (Auto)


 


 


 0.1 x10^3/uL


(0.0-0.2)


 


Sodium Level


 


 


 136 mmol/L


(136-145)


 


Potassium Level


 


 


 4.1 mmol/L


(3.5-5.1)


 


Chloride Level


 


 


 103 mmol/L


()


 


Carbon Dioxide Level


 


 


 27 mmol/L


(21-32)


 


Anion Gap   6 (6-14) 


 


Blood Urea Nitrogen


 


 


 10 mg/dL


(8-26)


 


Creatinine


 


 


 1.1 mg/dL


(0.7-1.3)


 


Estimated GFR


(Cockcroft-Gault) 


 


 68.3 





 


Glucose Level


 


 


 99 mg/dL


(70-99)


 


Calcium Level


 


 


 8.0 mg/dL


(8.5-10.1)








Allergies





                                    Allergies








Coded Allergies Type Severity Reaction Last Updated Verified


 


  albuterol Adverse Reaction Intermediate  1/22/20 Yes


 


  ibuprofen Adverse Reaction Intermediate Nausea and Vomiting 1/22/20 Yes








Disposition/Orders:  D/C to Home


Patient Instructions


no substance use





Justicifation of Admission Dx:


Justifications for Admission:


Justification of Admission Dx:  Yes


Sepsis:  Bacteremia











TRU TREVINO MD          Jul 10, 2020 15:32

## 2020-07-10 NOTE — PDOC
PROGRESS NOTES


Chief Complaint


Chief Complaint


 SURGICAL HISTORY


Past Surgical History


Pacemaker (AICD), Other (multiple PCIs- LHC at [facility] Jan 2018 showed showed

patent mid LAD stent, patent 1st diagonal stent, total chronic occlusion of 

first obtuse marginal with territory infarct but no indication for PCI, patent 

stent in left AV groove artery, patent stent in LPDA, non-dominant 100 % 

stenosis of proximal RCA. )





History of Present Illness


History of Present Illness





VTE Prophylaxis Ordered


VTE Prophylaxis Devices:  Yes


VTE Pharmacological Prophylaxi:  Yes





impression


Assessment/Plan


A/P:


Chest pain - high risk ACS given his history and lateral TWI, will trend 

troponins, telemetry, consult cardiology


Ischemic cardiomyopathy -also with nonischemic cardiomyopathy due to 

methamphetamine use historically.  Fluid dynamics seems stable.


CAD - s/p x4 cardiac catherizations. multiple stents in the past. At least 12 

stents reported. 


VT s/p AICD - medtronic. Will consult cardiology. needs device interrogation


Left ventricle systolic function is moderately impaired. EF 30-35%.  severe 

global hypokinesis of the left ventricle.


Tissue Doppler imaging reveals moderate left ventricular diastolic 

dysfunction.pacemaker lead in the right ventricle. moderate mitral 

regurgitation. Doppler and Color Flow revealed mild tricuspid regurgitation. 

There is moderate pulmonary hypertension


HTN - cont meds


HLD - cont meds


Hx of IV meth use - sober for 5 months. Tox screen negative and sees a counselor

through [facility]


Tobaccoism -counseled on cessation he is nondistended nicotine patch states he 

will need to start smoking as soon as he leaves


Anxiety/agitation -calmed with verbal counseling


History of Drug seeking behavior -advised with his substance abuse history is 

high risk and should not be given an opioid prescription on discharge and to 

minimize opioids while inpatient


LBBB // new


intermittent hypotension 


microcytic anemia











FEN - Cardiac


PPX - Lovenox


FULL CODE


Dispo - inpatient for high risk ACS


PLAN


CVC BED


Cardiology consult


trend troponin i neg x 2 


echo 


lovenox sq bid


fe panel


Follow up with his cardiologist in [place] and consider for outpt stress test. 

May DC per cardiac standpoint/ high risk of death with meth abuse

















38 min pt exam d/c planning , chart review, > 50% of time spent with exam, chart

review, pt care coordination











 Justicifation of Admission Dx: 


Justicifation of Admission Dx:


Justifications for Admission:


Justification of Admission Dx:  Yes





Vitals


Vitals





Vital Signs








  Date Time  Temp Pulse Resp B/P (MAP) Pulse Ox O2 Delivery O2 Flow Rate FiO2


 


7/10/20 07:00 97.7 80 14 95/52 (66) 98 Room Air  





 97.7       











Physical Exam


General:  Alert, Oriented X3, Cooperative, No acute distress


Heart:  Regular rate (SR with LBBB), Normal S1, Normal S2


Lungs:  Clear


Abdomen:  Normal bowel sounds, Soft, No tenderness, No hepatosplenomegaly, No 

masses


Extremities:  No clubbing, No cyanosis, No edema, Normal pulses, No 

tenderness/swelling


Skin:  No rashes, No breakdown, No significant lesion, Other (thrombophlebitis 

scarring)





Labs


LABS


2D DIMENSIONS 


RVDd   3.3 (2.9-3.5cm)   Left Atrium(2D)   4.5 (1.6-4.0cm)


IVSd   1.0 (0.7-1.1cm)   Aortic Root(2D)   2.8 (2.0-3.7cm)


LVDd   5.3 (3.9-5.9cm)   LVOT Diameter   2.2 (1.8-2.4cm)


PWd   1.0 (0.7-1.1cm)   LVDs      4.8 (2.5-4.0cm)


FS (%)    8.8 %      SV      26.1 ml


LVEF(%)   19.3 (>50%)         





Aortic Valve


AoV Peak Yossi.   114.3cm/s   AoV VTI      20.1cm


AO Peak GR.   5.2mmHg      LVOT Peak Yossi.   112.1cm/s


LVOT  VTI    20.80cm      AO Mean GR.   3mmHg


NICHOL (VMAX)   3.64cm2      NICHOL   (VTI)   3.84cm2





Mitral Valve


MV E Velocity   110.9cm/s   MV DECEL TIME   195ms


MV A Velocity   74.3cm/s   MV PHT      56ms


E/A  Ratio   1.5      MVA (PHT)   3.90cm2





TDI


E/Lateral E'   35.8   E/Medial E'   19.4





Tricuspid Valve


TR P. Velocity   321cm/s   RAP ESTIMATE   3mmHg


TR Peak Gr.   41mmHg   RVSP      44mmHg





 LEFT VENTRICLE 


The left ventricle is normal size. There is normal left ventricular wall 

thickness. Left ventricle systolic function is moderately impaired. EF 30-35%. 

There is severe global hypokinesis of the left ventricle. Tissue Doppler imaging

reveals moderate left ventricular diastolic dysfunction.





 RIGHT VENTRICLE 


The right ventricle is normal size. The right ventricular systolic function is 

normal. There is a pacemaker lead in the right ventricle.





 ATRIA 


The left atrium is mildly dilated. The right atrium size is normal. A pacemaker 

is seen in the right atrium consistent with history. The interatrial septum is 

intact with no evidence for an atrial septal defect or patent foramen ovale as 

noted on 2-D or Doppler imaging.





 AORTIC VALVE 


The aortic valve is calcified but opens well. Doppler and Color Flow revealed 

mild aortic regurgitation. There is no significant aortic valvular stenosis.





 MITRAL VALVE 


The mitral valve is calcified but opens well. There is no evidence of mitral 

valve prolapse. There is no mitral valve stenosis. Doppler and Color-flow 

revealed moderate mitral regurgitation.





 TRICUSPID VALVE 


The tricuspid valve is normal in structure and function. Doppler and Color Flow 

revealed mild tricuspid regurgitation. There is moderate pulmonary hypertension.

The PA pressure was estimated at 44 mmHg. There is no tricuspid valve stenosis.





 PULMONIC VALVE 


The pulmonic valve is not well visualized. Doppler and Color Flow revealed no 

pulmonic valvular regurgitation. There is no pulmonic valvular stenosis.





 GREAT VESSELS 


The aortic root is normal in size. The ascending aorta is not well seen. The IVC

is normal in size and collapses >50% with inspiration.





 PERICARDIAL EFFUSION 


There is no evidence of significant pericardial effusion.





Critical Notification


Critical Value: No





<Conclusion>


Left ventricle systolic function is moderately impaired. EF 30-35%.


There is severe global hypokinesis of the left ventricle.


Tissue Doppler imaging reveals moderate left ventricular diastolic dysfunction.


There is a pacemaker lead in the right ventricle.


Doppler and Color-flow revealed moderate mitral regurgitation.


Doppler and Color Flow revealed mild tricuspid regurgitation. There is moderate 

pulmonary hypertension. The PA pressure was estimated at 44 mmHg.





Signed by : Raymundo Lara, 


Electronically Approved : 07/10/2020 09:30:13














DICTATED and SIGNED BY:     RAYMUNDO LARA MD


DATE:     07/10/20 0907





CHEST AP ONLY


 


History: Reason: chest pain / Spl. Instructions:  / History: 


 


Comparison: January 21, 2020


 


Findings:


Patchy bibasilar opacities, unchanged. No pleural effusion. No 


pneumothorax. Left-sided ICD, unchanged. Normal heart size.


 


Impression: 


1.  Patchy bibasilar opacities, likely atelectasis.


 


Electronically signed by: Nolan Trevino DO (7/9/2020 1:26 PM) [id number]














DICTATED and SIGNED BY:     NOLAN TREVINO DO


DATE:     07/09/20 1326


Laboratory Tests








Test


 7/9/20


13:00 7/9/20


13:24 7/9/20


18:00 7/10/20


00:20


 


White Blood Count


 5.4 x10^3/uL


(4.0-11.0) 


 


 





 


Red Blood Count


 3.36 x10^6/uL


(4.30-5.70) 


 


 





 


Hemoglobin


 8.9 g/dL


(13.0-17.5) 


 


 





 


Hematocrit


 26.8 %


(39.0-53.0) 


 


 





 


Mean Corpuscular Volume 80 fL ()    


 


Mean Corpuscular Hemoglobin 27 pg (25-35)    


 


Mean Corpuscular Hemoglobin


Concent 33 g/dL


(31-37) 


 


 





 


Red Cell Distribution Width


 16.7 %


(11.5-14.5) 


 


 





 


Platelet Count


 266 x10^3/uL


(140-400) 


 


 





 


Neutrophils (%) (Auto) 61 % (31-73)    


 


Lymphocytes (%) (Auto) 20 % (24-48)    


 


Monocytes (%) (Auto) 13 % (0-9)    


 


Eosinophils (%) (Auto) 5 % (0-3)    


 


Basophils (%) (Auto) 1 % (0-3)    


 


Neutrophils # (Auto)


 3.3 x10^3/uL


(1.8-7.7) 


 


 





 


Lymphocytes # (Auto)


 1.1 x10^3/uL


(1.0-4.8) 


 


 





 


Monocytes # (Auto)


 0.7 x10^3/uL


(0.0-1.1) 


 


 





 


Eosinophils # (Auto)


 0.2 x10^3/uL


(0.0-0.7) 


 


 





 


Basophils # (Auto)


 0.1 x10^3/uL


(0.0-0.2) 


 


 





 


Prothrombin Time


 13.8 SEC


(11.7-14.0) 


 


 





 


Prothromb Time International


Ratio 1.1 (0.8-1.1) 


 


 


 





 


Activated Partial


Thromboplast Time 33 SEC (24-38) 


 


 


 





 


D-Dimer (Camryn)


 0.63 ug/mlFEU


(0.00-0.50) 


 


 





 


Sodium Level


 141 mmol/L


(136-145) 


 


 





 


Potassium Level


 4.0 mmol/L


(3.5-5.1) 


 


 





 


Chloride Level


 105 mmol/L


() 


 


 





 


Carbon Dioxide Level


 30 mmol/L


(21-32) 


 


 





 


Anion Gap 6 (6-14)    


 


Blood Urea Nitrogen


 10 mg/dL


(8-26) 


 


 





 


Creatinine


 1.2 mg/dL


(0.7-1.3) 


 


 





 


Estimated GFR


(Cockcroft-Gault) 61.8 


 


 


 





 


BUN/Creatinine Ratio 8 (6-20)    


 


Glucose Level


 91 mg/dL


(70-99) 


 


 





 


Calcium Level


 8.1 mg/dL


(8.5-10.1) 


 


 





 


Magnesium Level


 2.0 mg/dL


(1.8-2.4) 


 


 





 


Total Bilirubin


 0.3 mg/dL


(0.2-1.0) 


 


 





 


Aspartate Amino Transf


(AST/SGOT) 16 U/L (15-37) 


 


 


 





 


Alanine Aminotransferase


(ALT/SGPT) 19 U/L (16-63) 


 


 


 





 


Alkaline Phosphatase


 88 U/L


() 


 


 





 


Creatine Kinase


 125 U/L


() 


 


 





 


Creatine Kinase MB (Mass)


 0.9 ng/mL


(0.0-3.6) 


 


 





 


Creatine Kinase MB Relative


Index 0.7 % (0-4) 


 


 


 





 


Troponin I Quantitative


 < 0.017 ng/mL


(0.000-0.055) 


 < 0.017 ng/mL


(0.000-0.055) < 0.017 ng/mL


(0.000-0.055)


 


NT-Pro-B-Type Natriuretic


Peptide 5044 pg/mL


(0-124) 


 


 





 


Total Protein


 6.8 g/dL


(6.4-8.2) 


 


 





 


Albumin


 3.2 g/dL


(3.4-5.0) 


 


 





 


Albumin/Globulin Ratio 0.9 (1.0-1.7)    


 


Lipase


 94 U/L


() 


 


 





 


Urine Collection Type  Unknown   


 


Urine Color  Yellow   


 


Urine Clarity  Clear   


 


Urine pH  7.0 (<5.0-8.0)   


 


Urine Specific Gravity


 


 1.010


(1.000-1.030) 


 





 


Urine Protein


 


 Negative mg/dL


(NEG-TRACE) 


 





 


Urine Glucose (UA)


 


 Negative mg/dL


(NEG) 


 





 


Urine Ketones (Stick)


 


 Negative mg/dL


(NEG) 


 





 


Urine Blood  Negative (NEG)   


 


Urine Nitrite  Negative (NEG)   


 


Urine Bilirubin  Negative (NEG)   


 


Urine Urobilinogen Dipstick


 


 1.0 mg/dL (0.2


mg/dL) 


 





 


Urine Leukocyte Esterase  Negative (NEG)   


 


Urine RBC  1-2 /HPF (0-2)   


 


Urine WBC  0 /HPF (0-4)   


 


Urine Amorphous Sediment  Present /HPF   


 


Urine Bacteria  0 /HPF (0-FEW)   


 


Urine Opiates Screen  Pos (NEG)   


 


Urine Methadone Screen  Neg (NEG)   


 


Urine Barbiturates  Neg (NEG)   


 


Urine Phencyclidine Screen  Neg (NEG)   


 


Urine


Amphetamine/Methamphetamine 


 Neg (NEG) 


 


 





 


Urine Benzodiazepines Screen  Neg (NEG)   


 


Urine Cocaine Screen  Neg (NEG)   


 


Urine Cannabinoids Screen  Neg (NEG)   


 


Urine Ethyl Alcohol  Neg (NEG)   


 


Iron Level


 


 


 24 ug/dL


() 





 


Total Iron Binding Capacity


 


 


 345 ug/dL


(250-450) 





 


Iron Saturation   7 % (15-34)  


 


Triglycerides Level


 


 


 


 154 mg/dL


(0-150)


 


Cholesterol Level


 


 


 


 136 mg/dL


(0-200)


 


LDL Cholesterol, Calculated


 


 


 


 79 mg/dL


(0-100)


 


VLDL Cholesterol, Calculated


 


 


 


 31 mg/dL


(0-40)


 


Non-HDL Cholesterol Calculated


 


 


 


 110 mg/dL


(0-129)


 


HDL Cholesterol


 


 


 


 26 mg/dL


(40-60)


 


Cholesterol/HDL Ratio    5.2 


 


Test


 7/10/20


05:00 


 


 





 


White Blood Count


 3.5 x10^3/uL


(4.0-11.0) 


 


 





 


Red Blood Count


 3.42 x10^6/uL


(4.30-5.70) 


 


 





 


Hemoglobin


 9.0 g/dL


(13.0-17.5) 


 


 





 


Hematocrit


 26.8 %


(39.0-53.0) 


 


 





 


Mean Corpuscular Volume 78 fL ()    


 


Mean Corpuscular Hemoglobin 26 pg (25-35)    


 


Mean Corpuscular Hemoglobin


Concent 34 g/dL


(31-37) 


 


 





 


Red Cell Distribution Width


 16.8 %


(11.5-14.5) 


 


 





 


Platelet Count


 264 x10^3/uL


(140-400) 


 


 





 


Neutrophils (%) (Auto) 35 % (31-73)    


 


Lymphocytes (%) (Auto) 37 % (24-48)    


 


Monocytes (%) (Auto) 17 % (0-9)    


 


Eosinophils (%) (Auto) 9 % (0-3)    


 


Basophils (%) (Auto) 2 % (0-3)    


 


Neutrophils # (Auto)


 1.2 x10^3/uL


(1.8-7.7) 


 


 





 


Lymphocytes # (Auto)


 1.3 x10^3/uL


(1.0-4.8) 


 


 





 


Monocytes # (Auto)


 0.6 x10^3/uL


(0.0-1.1) 


 


 





 


Eosinophils # (Auto)


 0.3 x10^3/uL


(0.0-0.7) 


 


 





 


Basophils # (Auto)


 0.1 x10^3/uL


(0.0-0.2) 


 


 





 


Sodium Level


 136 mmol/L


(136-145) 


 


 





 


Potassium Level


 4.1 mmol/L


(3.5-5.1) 


 


 





 


Chloride Level


 103 mmol/L


() 


 


 





 


Carbon Dioxide Level


 27 mmol/L


(21-32) 


 


 





 


Anion Gap 6 (6-14)    


 


Blood Urea Nitrogen


 10 mg/dL


(8-26) 


 


 





 


Creatinine


 1.1 mg/dL


(0.7-1.3) 


 


 





 


Estimated GFR


(Cockcroft-Gault) 68.3 


 


 


 





 


Glucose Level


 99 mg/dL


(70-99) 


 


 





 


Calcium Level


 8.0 mg/dL


(8.5-10.1) 


 


 














Assessment and Plan


Assessmemt and Plan


Problems


Medical Problems:


(1) Anemia


Status: Acute  





(2) Chest pain


Status: Acute  











Comment


Review of Relevant


I have reviewed the following items luke (where applicable) has been applied.


Labs





Laboratory Tests








Test


 7/9/20


13:00 7/9/20


13:24 7/9/20


18:00 7/10/20


00:20


 


White Blood Count


 5.4 x10^3/uL


(4.0-11.0) 


 


 





 


Red Blood Count


 3.36 x10^6/uL


(4.30-5.70) 


 


 





 


Hemoglobin


 8.9 g/dL


(13.0-17.5) 


 


 





 


Hematocrit


 26.8 %


(39.0-53.0) 


 


 





 


Mean Corpuscular Volume 80 fL ()    


 


Mean Corpuscular Hemoglobin 27 pg (25-35)    


 


Mean Corpuscular Hemoglobin


Concent 33 g/dL


(31-37) 


 


 





 


Red Cell Distribution Width


 16.7 %


(11.5-14.5) 


 


 





 


Platelet Count


 266 x10^3/uL


(140-400) 


 


 





 


Neutrophils (%) (Auto) 61 % (31-73)    


 


Lymphocytes (%) (Auto) 20 % (24-48)    


 


Monocytes (%) (Auto) 13 % (0-9)    


 


Eosinophils (%) (Auto) 5 % (0-3)    


 


Basophils (%) (Auto) 1 % (0-3)    


 


Neutrophils # (Auto)


 3.3 x10^3/uL


(1.8-7.7) 


 


 





 


Lymphocytes # (Auto)


 1.1 x10^3/uL


(1.0-4.8) 


 


 





 


Monocytes # (Auto)


 0.7 x10^3/uL


(0.0-1.1) 


 


 





 


Eosinophils # (Auto)


 0.2 x10^3/uL


(0.0-0.7) 


 


 





 


Basophils # (Auto)


 0.1 x10^3/uL


(0.0-0.2) 


 


 





 


Prothrombin Time


 13.8 SEC


(11.7-14.0) 


 


 





 


Prothromb Time International


Ratio 1.1 (0.8-1.1) 


 


 


 





 


Activated Partial


Thromboplast Time 33 SEC (24-38) 


 


 


 





 


D-Dimer (Camryn)


 0.63 ug/mlFEU


(0.00-0.50) 


 


 





 


Sodium Level


 141 mmol/L


(136-145) 


 


 





 


Potassium Level


 4.0 mmol/L


(3.5-5.1) 


 


 





 


Chloride Level


 105 mmol/L


() 


 


 





 


Carbon Dioxide Level


 30 mmol/L


(21-32) 


 


 





 


Anion Gap 6 (6-14)    


 


Blood Urea Nitrogen


 10 mg/dL


(8-26) 


 


 





 


Creatinine


 1.2 mg/dL


(0.7-1.3) 


 


 





 


Estimated GFR


(Cockcroft-Gault) 61.8 


 


 


 





 


BUN/Creatinine Ratio 8 (6-20)    


 


Glucose Level


 91 mg/dL


(70-99) 


 


 





 


Calcium Level


 8.1 mg/dL


(8.5-10.1) 


 


 





 


Magnesium Level


 2.0 mg/dL


(1.8-2.4) 


 


 





 


Total Bilirubin


 0.3 mg/dL


(0.2-1.0) 


 


 





 


Aspartate Amino Transf


(AST/SGOT) 16 U/L (15-37) 


 


 


 





 


Alanine Aminotransferase


(ALT/SGPT) 19 U/L (16-63) 


 


 


 





 


Alkaline Phosphatase


 88 U/L


() 


 


 





 


Creatine Kinase


 125 U/L


() 


 


 





 


Creatine Kinase MB (Mass)


 0.9 ng/mL


(0.0-3.6) 


 


 





 


Creatine Kinase MB Relative


Index 0.7 % (0-4) 


 


 


 





 


Troponin I Quantitative


 < 0.017 ng/mL


(0.000-0.055) 


 < 0.017 ng/mL


(0.000-0.055) < 0.017 ng/mL


(0.000-0.055)


 


NT-Pro-B-Type Natriuretic


Peptide 5044 pg/mL


(0-124) 


 


 





 


Total Protein


 6.8 g/dL


(6.4-8.2) 


 


 





 


Albumin


 3.2 g/dL


(3.4-5.0) 


 


 





 


Albumin/Globulin Ratio 0.9 (1.0-1.7)    


 


Lipase


 94 U/L


() 


 


 





 


Urine Collection Type  Unknown   


 


Urine Color  Yellow   


 


Urine Clarity  Clear   


 


Urine pH  7.0 (<5.0-8.0)   


 


Urine Specific Gravity


 


 1.010


(1.000-1.030) 


 





 


Urine Protein


 


 Negative mg/dL


(NEG-TRACE) 


 





 


Urine Glucose (UA)


 


 Negative mg/dL


(NEG) 


 





 


Urine Ketones (Stick)


 


 Negative mg/dL


(NEG) 


 





 


Urine Blood  Negative (NEG)   


 


Urine Nitrite  Negative (NEG)   


 


Urine Bilirubin  Negative (NEG)   


 


Urine Urobilinogen Dipstick


 


 1.0 mg/dL (0.2


mg/dL) 


 





 


Urine Leukocyte Esterase  Negative (NEG)   


 


Urine RBC  1-2 /HPF (0-2)   


 


Urine WBC  0 /HPF (0-4)   


 


Urine Amorphous Sediment  Present /HPF   


 


Urine Bacteria  0 /HPF (0-FEW)   


 


Urine Opiates Screen  Pos (NEG)   


 


Urine Methadone Screen  Neg (NEG)   


 


Urine Barbiturates  Neg (NEG)   


 


Urine Phencyclidine Screen  Neg (NEG)   


 


Urine


Amphetamine/Methamphetamine 


 Neg (NEG) 


 


 





 


Urine Benzodiazepines Screen  Neg (NEG)   


 


Urine Cocaine Screen  Neg (NEG)   


 


Urine Cannabinoids Screen  Neg (NEG)   


 


Urine Ethyl Alcohol  Neg (NEG)   


 


Iron Level


 


 


 24 ug/dL


() 





 


Total Iron Binding Capacity


 


 


 345 ug/dL


(250-450) 





 


Iron Saturation   7 % (15-34)  


 


Triglycerides Level


 


 


 


 154 mg/dL


(0-150)


 


Cholesterol Level


 


 


 


 136 mg/dL


(0-200)


 


LDL Cholesterol, Calculated


 


 


 


 79 mg/dL


(0-100)


 


VLDL Cholesterol, Calculated


 


 


 


 31 mg/dL


(0-40)


 


Non-HDL Cholesterol Calculated


 


 


 


 110 mg/dL


(0-129)


 


HDL Cholesterol


 


 


 


 26 mg/dL


(40-60)


 


Cholesterol/HDL Ratio    5.2 


 


Test


 7/10/20


05:00 


 


 





 


White Blood Count


 3.5 x10^3/uL


(4.0-11.0) 


 


 





 


Red Blood Count


 3.42 x10^6/uL


(4.30-5.70) 


 


 





 


Hemoglobin


 9.0 g/dL


(13.0-17.5) 


 


 





 


Hematocrit


 26.8 %


(39.0-53.0) 


 


 





 


Mean Corpuscular Volume 78 fL ()    


 


Mean Corpuscular Hemoglobin 26 pg (25-35)    


 


Mean Corpuscular Hemoglobin


Concent 34 g/dL


(31-37) 


 


 





 


Red Cell Distribution Width


 16.8 %


(11.5-14.5) 


 


 





 


Platelet Count


 264 x10^3/uL


(140-400) 


 


 





 


Neutrophils (%) (Auto) 35 % (31-73)    


 


Lymphocytes (%) (Auto) 37 % (24-48)    


 


Monocytes (%) (Auto) 17 % (0-9)    


 


Eosinophils (%) (Auto) 9 % (0-3)    


 


Basophils (%) (Auto) 2 % (0-3)    


 


Neutrophils # (Auto)


 1.2 x10^3/uL


(1.8-7.7) 


 


 





 


Lymphocytes # (Auto)


 1.3 x10^3/uL


(1.0-4.8) 


 


 





 


Monocytes # (Auto)


 0.6 x10^3/uL


(0.0-1.1) 


 


 





 


Eosinophils # (Auto)


 0.3 x10^3/uL


(0.0-0.7) 


 


 





 


Basophils # (Auto)


 0.1 x10^3/uL


(0.0-0.2) 


 


 





 


Sodium Level


 136 mmol/L


(136-145) 


 


 





 


Potassium Level


 4.1 mmol/L


(3.5-5.1) 


 


 





 


Chloride Level


 103 mmol/L


() 


 


 





 


Carbon Dioxide Level


 27 mmol/L


(21-32) 


 


 





 


Anion Gap 6 (6-14)    


 


Blood Urea Nitrogen


 10 mg/dL


(8-26) 


 


 





 


Creatinine


 1.1 mg/dL


(0.7-1.3) 


 


 





 


Estimated GFR


(Cockcroft-Gault) 68.3 


 


 


 





 


Glucose Level


 99 mg/dL


(70-99) 


 


 





 


Calcium Level


 8.0 mg/dL


(8.5-10.1) 


 


 











Laboratory Tests








Test


 7/9/20


13:00 7/9/20


13:24 7/9/20


18:00 7/10/20


00:20


 


White Blood Count


 5.4 x10^3/uL


(4.0-11.0) 


 


 





 


Red Blood Count


 3.36 x10^6/uL


(4.30-5.70) 


 


 





 


Hemoglobin


 8.9 g/dL


(13.0-17.5) 


 


 





 


Hematocrit


 26.8 %


(39.0-53.0) 


 


 





 


Mean Corpuscular Volume 80 fL ()    


 


Mean Corpuscular Hemoglobin 27 pg (25-35)    


 


Mean Corpuscular Hemoglobin


Concent 33 g/dL


(31-37) 


 


 





 


Red Cell Distribution Width


 16.7 %


(11.5-14.5) 


 


 





 


Platelet Count


 266 x10^3/uL


(140-400) 


 


 





 


Neutrophils (%) (Auto) 61 % (31-73)    


 


Lymphocytes (%) (Auto) 20 % (24-48)    


 


Monocytes (%) (Auto) 13 % (0-9)    


 


Eosinophils (%) (Auto) 5 % (0-3)    


 


Basophils (%) (Auto) 1 % (0-3)    


 


Neutrophils # (Auto)


 3.3 x10^3/uL


(1.8-7.7) 


 


 





 


Lymphocytes # (Auto)


 1.1 x10^3/uL


(1.0-4.8) 


 


 





 


Monocytes # (Auto)


 0.7 x10^3/uL


(0.0-1.1) 


 


 





 


Eosinophils # (Auto)


 0.2 x10^3/uL


(0.0-0.7) 


 


 





 


Basophils # (Auto)


 0.1 x10^3/uL


(0.0-0.2) 


 


 





 


Prothrombin Time


 13.8 SEC


(11.7-14.0) 


 


 





 


Prothromb Time International


Ratio 1.1 (0.8-1.1) 


 


 


 





 


Activated Partial


Thromboplast Time 33 SEC (24-38) 


 


 


 





 


D-Dimer (Camryn)


 0.63 ug/mlFEU


(0.00-0.50) 


 


 





 


Sodium Level


 141 mmol/L


(136-145) 


 


 





 


Potassium Level


 4.0 mmol/L


(3.5-5.1) 


 


 





 


Chloride Level


 105 mmol/L


() 


 


 





 


Carbon Dioxide Level


 30 mmol/L


(21-32) 


 


 





 


Anion Gap 6 (6-14)    


 


Blood Urea Nitrogen


 10 mg/dL


(8-26) 


 


 





 


Creatinine


 1.2 mg/dL


(0.7-1.3) 


 


 





 


Estimated GFR


(Cockcroft-Gault) 61.8 


 


 


 





 


BUN/Creatinine Ratio 8 (6-20)    


 


Glucose Level


 91 mg/dL


(70-99) 


 


 





 


Calcium Level


 8.1 mg/dL


(8.5-10.1) 


 


 





 


Magnesium Level


 2.0 mg/dL


(1.8-2.4) 


 


 





 


Total Bilirubin


 0.3 mg/dL


(0.2-1.0) 


 


 





 


Aspartate Amino Transf


(AST/SGOT) 16 U/L (15-37) 


 


 


 





 


Alanine Aminotransferase


(ALT/SGPT) 19 U/L (16-63) 


 


 


 





 


Alkaline Phosphatase


 88 U/L


() 


 


 





 


Creatine Kinase


 125 U/L


() 


 


 





 


Creatine Kinase MB (Mass)


 0.9 ng/mL


(0.0-3.6) 


 


 





 


Creatine Kinase MB Relative


Index 0.7 % (0-4) 


 


 


 





 


Troponin I Quantitative


 < 0.017 ng/mL


(0.000-0.055) 


 < 0.017 ng/mL


(0.000-0.055) < 0.017 ng/mL


(0.000-0.055)


 


NT-Pro-B-Type Natriuretic


Peptide 5044 pg/mL


(0-124) 


 


 





 


Total Protein


 6.8 g/dL


(6.4-8.2) 


 


 





 


Albumin


 3.2 g/dL


(3.4-5.0) 


 


 





 


Albumin/Globulin Ratio 0.9 (1.0-1.7)    


 


Lipase


 94 U/L


() 


 


 





 


Urine Collection Type  Unknown   


 


Urine Color  Yellow   


 


Urine Clarity  Clear   


 


Urine pH  7.0 (<5.0-8.0)   


 


Urine Specific Gravity


 


 1.010


(1.000-1.030) 


 





 


Urine Protein


 


 Negative mg/dL


(NEG-TRACE) 


 





 


Urine Glucose (UA)


 


 Negative mg/dL


(NEG) 


 





 


Urine Ketones (Stick)


 


 Negative mg/dL


(NEG) 


 





 


Urine Blood  Negative (NEG)   


 


Urine Nitrite  Negative (NEG)   


 


Urine Bilirubin  Negative (NEG)   


 


Urine Urobilinogen Dipstick


 


 1.0 mg/dL (0.2


mg/dL) 


 





 


Urine Leukocyte Esterase  Negative (NEG)   


 


Urine RBC  1-2 /HPF (0-2)   


 


Urine WBC  0 /HPF (0-4)   


 


Urine Amorphous Sediment  Present /HPF   


 


Urine Bacteria  0 /HPF (0-FEW)   


 


Urine Opiates Screen  Pos (NEG)   


 


Urine Methadone Screen  Neg (NEG)   


 


Urine Barbiturates  Neg (NEG)   


 


Urine Phencyclidine Screen  Neg (NEG)   


 


Urine


Amphetamine/Methamphetamine 


 Neg (NEG) 


 


 





 


Urine Benzodiazepines Screen  Neg (NEG)   


 


Urine Cocaine Screen  Neg (NEG)   


 


Urine Cannabinoids Screen  Neg (NEG)   


 


Urine Ethyl Alcohol  Neg (NEG)   


 


Iron Level


 


 


 24 ug/dL


() 





 


Total Iron Binding Capacity


 


 


 345 ug/dL


(250-450) 





 


Iron Saturation   7 % (15-34)  


 


Triglycerides Level


 


 


 


 154 mg/dL


(0-150)


 


Cholesterol Level


 


 


 


 136 mg/dL


(0-200)


 


LDL Cholesterol, Calculated


 


 


 


 79 mg/dL


(0-100)


 


VLDL Cholesterol, Calculated


 


 


 


 31 mg/dL


(0-40)


 


Non-HDL Cholesterol Calculated


 


 


 


 110 mg/dL


(0-129)


 


HDL Cholesterol


 


 


 


 26 mg/dL


(40-60)


 


Cholesterol/HDL Ratio    5.2 


 


Test


 7/10/20


05:00 


 


 





 


White Blood Count


 3.5 x10^3/uL


(4.0-11.0) 


 


 





 


Red Blood Count


 3.42 x10^6/uL


(4.30-5.70) 


 


 





 


Hemoglobin


 9.0 g/dL


(13.0-17.5) 


 


 





 


Hematocrit


 26.8 %


(39.0-53.0) 


 


 





 


Mean Corpuscular Volume 78 fL ()    


 


Mean Corpuscular Hemoglobin 26 pg (25-35)    


 


Mean Corpuscular Hemoglobin


Concent 34 g/dL


(31-37) 


 


 





 


Red Cell Distribution Width


 16.8 %


(11.5-14.5) 


 


 





 


Platelet Count


 264 x10^3/uL


(140-400) 


 


 





 


Neutrophils (%) (Auto) 35 % (31-73)    


 


Lymphocytes (%) (Auto) 37 % (24-48)    


 


Monocytes (%) (Auto) 17 % (0-9)    


 


Eosinophils (%) (Auto) 9 % (0-3)    


 


Basophils (%) (Auto) 2 % (0-3)    


 


Neutrophils # (Auto)


 1.2 x10^3/uL


(1.8-7.7) 


 


 





 


Lymphocytes # (Auto)


 1.3 x10^3/uL


(1.0-4.8) 


 


 





 


Monocytes # (Auto)


 0.6 x10^3/uL


(0.0-1.1) 


 


 





 


Eosinophils # (Auto)


 0.3 x10^3/uL


(0.0-0.7) 


 


 





 


Basophils # (Auto)


 0.1 x10^3/uL


(0.0-0.2) 


 


 





 


Sodium Level


 136 mmol/L


(136-145) 


 


 





 


Potassium Level


 4.1 mmol/L


(3.5-5.1) 


 


 





 


Chloride Level


 103 mmol/L


() 


 


 





 


Carbon Dioxide Level


 27 mmol/L


(21-32) 


 


 





 


Anion Gap 6 (6-14)    


 


Blood Urea Nitrogen


 10 mg/dL


(8-26) 


 


 





 


Creatinine


 1.1 mg/dL


(0.7-1.3) 


 


 





 


Estimated GFR


(Cockcroft-Gault) 68.3 


 


 


 





 


Glucose Level


 99 mg/dL


(70-99) 


 


 





 


Calcium Level


 8.0 mg/dL


(8.5-10.1) 


 


 











Medications





Current Medications


Fentanyl Citrate (Fentanyl 2ml Vial) 50 mcg 1X  ONCE IV  Last administered on 

7/9/20at 13:17;  Start 7/9/20 at 13:15;  Stop 7/9/20 at 13:16;  Status DC


Ondansetron HCl (Zofran) 4 mg 1X  ONCE IV  Last administered on 7/9/20at 13:17; 

Start 7/9/20 at 13:15;  Stop 7/9/20 at 13:16;  Status DC


Fentanyl Citrate (Fentanyl 2ml Vial) 50 mcg PRN Q1HR  PRN IV PAIN Last 

administered on 7/10/20at 06:07;  Start 7/9/20 at 14:45;  Stop 7/10/20 at 14:44


Aspirin (Ecotrin) 81 mg DAILY PO ;  Start 7/10/20 at 09:00


Atorvastatin Calcium (Lipitor) 10 mg HS PO  Last administered on 7/9/20at 21:04;

 Start 7/9/20 at 21:00


Clopidogrel Bisulfate (Plavix) 75 mg DAILY PO ;  Start 7/10/20 at 09:00


Metoprolol Succinate (Toprol Xl) 12.5 mg DAILY PO ;  Start 7/10/20 at 09:00


Nitroglycerin (Nitrostat) 0.4 mg PRN Q5MIN  PRN SL CHEST PAIN;  Start 7/9/20 at 

15:15


Ipratropium Bromide (Atrovent) 0.5 mg RTQID NEB ;  Start 7/9/20 at 16:00


Pregabalin (Lyrica) 300 mg BID PO  Last administered on 7/9/20at 21:02;  Start 

7/9/20 at 21:00


Pantoprazole Sodium (Protonix) 40 mg 1X  ONCE PO  Last administered on 7/9/20at 

16:15;  Start 7/9/20 at 15:15;  Stop 7/9/20 at 15:16;  Status DC


Pantoprazole Sodium (Protonix) 40 mg DAILYAC PO ;  Start 7/10/20 at 07:30


Codeine Sulfate (Codeine) 15 mg PRN Q6HRS  PRN PO PAIN;  Start 7/9/20 at 15:15


Enoxaparin Sodium (Lovenox 80mg Syringe) 70 mg 1X  ONCE SQ ;  Start 7/9/20 at 

16:45;  Stop 7/9/20 at 16:46;  Status DC


Enoxaparin Sodium (Lovenox 80mg Syringe) 70 mg Q12HR SQ ;  Start 7/10/20 at 

09:00


Info (Anti-Coagulation Monitoring By Pharmacy) 1 each PRN DAILY  PRN [facility] SEE 

COMMENTS;  Start 7/9/20 at 17:15





Active Scripts


Active


Reported


Spiriva (Tiotropium Bromide) 18 Mcg Cap.w.dev 1 Cap IH DAILY


Atorvastatin Calcium 10 Mg Tablet 10 Mg PO HS


Metoprolol Succinate ( Xl ) (Metoprolol Succinate) 25 Mg Tab.er.24h 12.5 Mg PO 

DAILY


Spiriva (Tiotropium [place]) 18 Mcg Cap.w.dev 1 Cap IH DAILY


Ms Contin (Morphine Sulfate) 30 Mg Tablet.er 1 Tab PO BID PRN


NITROGLYCERIN SubLingual (Nitroglycerin) 0.4 Mg Tab.subl 0.4 Mg SL PRN Q5MIN PRN


Lyrica (Pregabalin) 300 Mg Capsule 1 Cap PO BID


Plavix (Clopidogrel Bisulfate) 75 Mg Tablet 75 Mg PO DAILY


Aspir 81 (Aspirin) 81 Mg Tablet. 1 Tab PO DAILY


Vitals/I & O





Vital Sign - Last 24 Hours








 7/9/20 7/9/20 7/9/20 7/9/20





 12:30 12:56 13:29 14:29


 


Temp 98.3   





 98.3   


 


Pulse 84 94 78 73


 


Resp 20   


 


B/P (MAP) 122/78 (93) 105/68 (80) 107/65 (79) 107/69 (82)


 


Pulse Ox 99 97 97 97


 


O2 Delivery Room Air Room Air Room Air Room Air


 


    





    





 7/9/20 7/9/20 7/9/20 7/9/20





 15:32 19:00 19:53 21:03


 


Temp 97.6 97.9  





 97.6 97.9  


 


Pulse 92 80  


 


Resp 20 18  18


 


B/P (MAP) 133/82 (99) 107/55 (72)  


 


Pulse Ox 98 98  98


 


O2 Delivery Room Air Room Air Room Air Room Air


 


    





    





 7/9/20 7/9/20 7/10/20 7/10/20





 21:33 22:50 01:57 02:17


 


Temp  97.8  97.8





  97.8  97.8


 


Pulse  89  82


 


Resp 14 20 14 14


 


B/P (MAP)  95/51 (66)  98/55 (69)


 


Pulse Ox 98 98 98 98


 


O2 Delivery Room Air Room Air Room Air Room Air


 


    





    





 7/10/20 7/10/20 7/10/20 





 02:27 06:07 07:00 


 


Temp   97.7 





   97.7 


 


Pulse   80 


 


Resp 16 16 14 


 


B/P (MAP)   95/52 (66) 


 


Pulse Ox 98 98 98 


 


O2 Delivery Room Air Room Air Room Air 














Intake and Output   


 


 7/9/20 7/9/20 7/10/20





 15:00 23:00 07:00


 


Intake Total  724 ml 0 ml


 


Balance  724 ml 0 ml











Justicifation of Admission Dx:


Justifications for Admission:


Justification of Admission Dx:  Yes


Sepsis:  Bacteremia











TRU TREVINO MD          Jul 10, 2020 09:09

## 2020-07-10 NOTE — CARD
MR#: [id number]

Account#: [id number]

Accession#: [id number]

Date of Study: 07/10/2020

Ordering Physician: ECHO PACHECO, 

Referring Physician: ECHO PACHECO 

Tech: Elaine Fields SAADIA





--------------- APPROVED REPORT --------------





EXAM: Two-dimensional and M-mode echocardiogram with Doppler and color Doppler.



Other Information 

Quality : Good



INDICATION

Cardiac Disease: CAD 

Chest Pain 



Surgery/Intervention

ICD/Pacemaker: Date: 2013



2D DIMENSIONS 

RVDd3.3 (2.9-3.5cm)Left Atrium(2D)4.5 (1.6-4.0cm)

IVSd1.0 (0.7-1.1cm)Aortic Root(2D)2.8 (2.0-3.7cm)

LVDd5.3 (3.9-5.9cm)LVOT Diameter2.2 (1.8-2.4cm)

PWd1.0 (0.7-1.1cm)LVDs4.8 (2.5-4.0cm)

FS (%) 8.8 %SV26.1 ml

LVEF(%)19.3 (>50%)



Aortic Valve

AoV Peak Yossi.114.3cm/sAoV VTI20.1cm

AO Peak GR.5.2mmHgLVOT Peak Yossi.112.1cm/s

LVOT  VTI 20.80cmAO Mean GR.3mmHg

NICHOL (VMAX)3.01vp3GVY   (VTI)3.84cm2



Mitral Valve

MV E Xvkynidg915.9cm/sMV DECEL [id number]

MV A Eqfpqwiy98.3cm/sMV KGX50rb

E/A  Ratio1.5MVA (PHT)3.90cm2



TDI

E/Lateral E'35.8E/Medial E'19.4



Tricuspid Valve

TR P. Vtpxnatk678tq/sRAP WUECXTQL2lcNu

TR Peak Gr.01kyWvMMHU43edNe



 LEFT VENTRICLE 

The left ventricle is normal size. There is normal left ventricular wall thickness. Left ventricle sy
stolic function is moderately impaired. EF 30-35%. There is severe global hypokinesis of the left nadine
tricle. Tissue Doppler imaging reveals moderate left ventricular diastolic dysfunction.



 RIGHT VENTRICLE 

The right ventricle is normal size. The right ventricular systolic function is normal. There is a pac
emaker lead in the right ventricle.



 ATRIA 

The left atrium is mildly dilated. The right atrium size is normal. A pacemaker is seen in the right 
atrium consistent with history. The interatrial septum is intact with no evidence for an atrial septa
l defect or patent foramen ovale as noted on 2-D or Doppler imaging.



 AORTIC VALVE 

The aortic valve is calcified but opens well. Doppler and Color Flow revealed mild aortic regurgitati
on. There is no significant aortic valvular stenosis.



 MITRAL VALVE 

The mitral valve is calcified but opens well. There is no evidence of mitral valve prolapse. There is
 no mitral valve stenosis. Doppler and Color-flow revealed moderate mitral regurgitation.



 TRICUSPID VALVE 

The tricuspid valve is normal in structure and function. Doppler and Color Flow revealed mild tricusp
id regurgitation. There is moderate pulmonary hypertension. The PA pressure was estimated at 44 mmHg.
 There is no tricuspid valve stenosis.



 PULMONIC VALVE 

The pulmonic valve is not well visualized. Doppler and Color Flow revealed no pulmonic valvular regur
gitation. There is no pulmonic valvular stenosis.



 GREAT VESSELS 

The aortic root is normal in size. The ascending aorta is not well seen. The IVC is normal in size an
d collapses >50% with inspiration.



 PERICARDIAL EFFUSION 

There is no evidence of significant pericardial effusion.



Critical Notification

Critical Value: No



<Conclusion>

Left ventricle systolic function is moderately impaired. EF 30-35%.

There is severe global hypokinesis of the left ventricle.

Tissue Doppler imaging reveals moderate left ventricular diastolic dysfunction.

There is a pacemaker lead in the right ventricle.

Doppler and Color-flow revealed moderate mitral regurgitation.

Doppler and Color Flow revealed mild tricuspid regurgitation. There is moderate pulmonary hypertensio
n. The PA pressure was estimated at 44 mmHg.



Signed by : Cal Lara, 

Electronically Approved : 07/10/2020 09:30:13

## 2020-07-10 NOTE — EKG
[facility]

               [address] [place] [address]

Test Date:    2020-07-10               Test Time:    08:51:19

Pat Name:     JOB RIOS           Department:   

Patient ID:   [id number]           Room:         263 1

Gender:       M                        Technician:   ULISES

:          1960               Requested By: ECHO PACHECO

Order Number: [id number]           Reading MD:     

                                 Measurements

Intervals                              Axis          

Rate:         70                       P:            64

NY:           150                      QRS:          -10

QRSD:         140                      T:            207

QT:           446                                    

QTc:          485                                    

                           Interpretive Statements

SINUS RHYTHM

VENTRICULAR PREMATURE COMPLEX(ES)

CONSIDER WPW, TYPE B

LEFTWARD AXIS

LOW LIMB LEAD VOLTAGE

QRS(T) CONTOUR ABNORMALITY

CONSIDER ANTEROLATERAL MYOCARDIAL DAMAGE

ST & T ABNORMALITY, CONSIDER RECENT

ANTEROSEPTAL MYOCARDIAL OR PERICARDIAL DAMAGE

ABNORMAL ECG

RI6.02

Compared to ECG 2020 12:38:37

Atrial abnormality no longer present

Possible ischemia no longer present

T-wave abnormality still present

## 2020-07-10 NOTE — DISCH
DISCHARGE INSTRUCTIONS


Condition on Discharge


Condition on Discharge:  Guarded





Activity After Discharge


Activity Instructions for Disc:  Activity as tolerated


Lifting Instructions after Dis:  No heavy lifting, No pulling or pushing


Exercise Instruction after Dis:  Progress as tolerated


Driving Instructions after Dis:  Do not drive


Weight Bearing Status after Di:  As tolerated





Diet after Discharge


Diet after Discharge:  Cardiac


Diet Texture:  Regular


Liquid Texture:  Thin Liquid


Swallowing Supervision:  None needed





Checks after Discharge


Checks after discharge:  Check blood press - daily, Check your Temp as needed





Contacting the DR. after DC


Call your doctor for:  If your condition worsens





Treatment/Equipment after DC


Adaptive Equipment Issued:  None





Warfarin Follow-Up


Warfarin Follow UP:  no substance abuse











TRU TREVINO MD          Jul 10, 2020 15:35

## 2020-08-07 ENCOUNTER — HOSPITAL ENCOUNTER (INPATIENT)
Dept: HOSPITAL 61 - ER | Age: 60
LOS: 3 days | Discharge: HOME | DRG: 392 | End: 2020-08-10
Attending: INTERNAL MEDICINE | Admitting: INTERNAL MEDICINE
Payer: MEDICAID

## 2020-08-07 VITALS — HEIGHT: 67 IN | BODY MASS INDEX: 22.56 KG/M2 | WEIGHT: 143.74 LBS

## 2020-08-07 VITALS — DIASTOLIC BLOOD PRESSURE: 59 MMHG | SYSTOLIC BLOOD PRESSURE: 101 MMHG

## 2020-08-07 VITALS — DIASTOLIC BLOOD PRESSURE: 71 MMHG | SYSTOLIC BLOOD PRESSURE: 110 MMHG

## 2020-08-07 DIAGNOSIS — E78.5: ICD-10-CM

## 2020-08-07 DIAGNOSIS — E11.9: ICD-10-CM

## 2020-08-07 DIAGNOSIS — Z95.5: ICD-10-CM

## 2020-08-07 DIAGNOSIS — Z86.73: ICD-10-CM

## 2020-08-07 DIAGNOSIS — K21.9: Primary | ICD-10-CM

## 2020-08-07 DIAGNOSIS — F41.9: ICD-10-CM

## 2020-08-07 DIAGNOSIS — Z20.828: ICD-10-CM

## 2020-08-07 DIAGNOSIS — I25.5: ICD-10-CM

## 2020-08-07 DIAGNOSIS — F15.10: ICD-10-CM

## 2020-08-07 DIAGNOSIS — D72.819: ICD-10-CM

## 2020-08-07 DIAGNOSIS — I42.7: ICD-10-CM

## 2020-08-07 DIAGNOSIS — Z88.8: ICD-10-CM

## 2020-08-07 DIAGNOSIS — T43.625A: ICD-10-CM

## 2020-08-07 DIAGNOSIS — I50.9: ICD-10-CM

## 2020-08-07 DIAGNOSIS — E78.00: ICD-10-CM

## 2020-08-07 DIAGNOSIS — Z82.49: ICD-10-CM

## 2020-08-07 DIAGNOSIS — I25.2: ICD-10-CM

## 2020-08-07 DIAGNOSIS — D64.9: ICD-10-CM

## 2020-08-07 DIAGNOSIS — I11.0: ICD-10-CM

## 2020-08-07 DIAGNOSIS — I25.10: ICD-10-CM

## 2020-08-07 DIAGNOSIS — J44.9: ICD-10-CM

## 2020-08-07 DIAGNOSIS — Z90.81: ICD-10-CM

## 2020-08-07 DIAGNOSIS — Z95.810: ICD-10-CM

## 2020-08-07 DIAGNOSIS — F17.210: ICD-10-CM
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
## 2020-08-07 LAB
ALBUMIN SERPL-MCNC: 3.5 G/DL (ref 3.4–5)
ALBUMIN/GLOB SERPL: 1.1 {RATIO} (ref 1–1.7)
ALP SERPL-CCNC: 86 U/L (ref 46–116)
ALT SERPL-CCNC: 19 U/L (ref 16–63)
AMPHETAMINE/METHAMPHETAMINE: (no result)
ANION GAP SERPL CALC-SCNC: 11 MMOL/L (ref 6–14)
APTT BLD: 35 SEC (ref 24–38)
APTT PPP: YELLOW S
AST SERPL-CCNC: 13 U/L (ref 15–37)
BACTERIA #/AREA URNS HPF: (no result) /HPF
BARBITURATES UR-MCNC: (no result) UG/ML
BASOPHILS # BLD AUTO: 0 X10^3/UL (ref 0–0.2)
BASOPHILS NFR BLD: 0 % (ref 0–3)
BENZODIAZ UR-MCNC: (no result) UG/L
BILIRUB SERPL-MCNC: 0.2 MG/DL (ref 0.2–1)
BILIRUB UR QL STRIP: NEGATIVE
BUN SERPL-MCNC: 10 MG/DL (ref 8–26)
BUN/CREAT SERPL: 11 (ref 6–20)
CALCIUM SERPL-MCNC: 8.7 MG/DL (ref 8.5–10.1)
CANNABINOIDS UR-MCNC: (no result) UG/L
CHLORIDE SERPL-SCNC: 101 MMOL/L (ref 98–107)
CO2 SERPL-SCNC: 24 MMOL/L (ref 21–32)
COCAINE UR-MCNC: (no result) NG/ML
CREAT SERPL-MCNC: 0.9 MG/DL (ref 0.7–1.3)
D DIMER PPP FEU-MCNC: 0.46 UG/MLFEU (ref 0–0.5)
EOSINOPHIL NFR BLD: 0.1 X10^3/UL (ref 0–0.7)
EOSINOPHIL NFR BLD: 2 % (ref 0–3)
ERYTHROCYTE [DISTWIDTH] IN BLOOD BY AUTOMATED COUNT: 17.1 % (ref 11.5–14.5)
FIBRINOGEN PPP-MCNC: CLEAR MG/DL
GFR SERPLBLD BASED ON 1.73 SQ M-ARVRAT: 86.1 ML/MIN
GLOBULIN SER-MCNC: 3.3 G/DL (ref 2.2–3.8)
GLUCOSE SERPL-MCNC: 86 MG/DL (ref 70–99)
HCT VFR BLD CALC: 24.4 % (ref 39–53)
HGB BLD-MCNC: 8.1 G/DL (ref 13–17.5)
LIPASE: 216 U/L (ref 73–393)
LYMPHOCYTES # BLD: 1 X10^3/UL (ref 1–4.8)
LYMPHOCYTES NFR BLD AUTO: 18 % (ref 24–48)
MAGNESIUM SERPL-MCNC: 1.9 MG/DL (ref 1.8–2.4)
MCH RBC QN AUTO: 25 PG (ref 25–35)
MCHC RBC AUTO-ENTMCNC: 33 G/DL (ref 31–37)
MCV RBC AUTO: 76 FL (ref 79–100)
METHADONE SERPL-MCNC: (no result) NG/ML
MONO #: 0.5 X10^3/UL (ref 0–1.1)
MONOCYTES NFR BLD: 9 % (ref 0–9)
NEUT #: 3.9 X10^3/UL (ref 1.8–7.7)
NEUTROPHILS NFR BLD AUTO: 71 % (ref 31–73)
NITRITE UR QL STRIP: NEGATIVE
OPIATES UR-MCNC: (no result) NG/ML
PCP SERPL-MCNC: (no result) MG/DL
PH UR STRIP: 7 [PH]
PLATELET # BLD AUTO: 340 X10^3/UL (ref 140–400)
POTASSIUM SERPL-SCNC: 3.9 MMOL/L (ref 3.5–5.1)
PROT SERPL-MCNC: 6.8 G/DL (ref 6.4–8.2)
PROT UR STRIP-MCNC: NEGATIVE MG/DL
PROTHROMBIN TIME: 13.7 SEC (ref 11.7–14)
RBC # BLD AUTO: 3.22 X10^6/UL (ref 4.3–5.7)
RBC #/AREA URNS HPF: 0 /HPF (ref 0–2)
SODIUM SERPL-SCNC: 136 MMOL/L (ref 136–145)
SPERM #/AREA URNS HPF: PRESENT /HPF
SQUAMOUS #/AREA URNS LPF: (no result) /LPF
UROBILINOGEN UR-MCNC: 1 MG/DL
WBC # BLD AUTO: 5.5 X10^3/UL (ref 4–11)
WBC #/AREA URNS HPF: (no result) /HPF (ref 0–4)

## 2020-08-07 PROCEDURE — 87426 SARSCOV CORONAVIRUS AG IA: CPT

## 2020-08-07 PROCEDURE — 85025 COMPLETE CBC W/AUTO DIFF WBC: CPT

## 2020-08-07 PROCEDURE — 93005 ELECTROCARDIOGRAM TRACING: CPT

## 2020-08-07 PROCEDURE — 96361 HYDRATE IV INFUSION ADD-ON: CPT

## 2020-08-07 PROCEDURE — 81001 URINALYSIS AUTO W/SCOPE: CPT

## 2020-08-07 PROCEDURE — 80048 BASIC METABOLIC PNL TOTAL CA: CPT

## 2020-08-07 PROCEDURE — 85730 THROMBOPLASTIN TIME PARTIAL: CPT

## 2020-08-07 PROCEDURE — 83690 ASSAY OF LIPASE: CPT

## 2020-08-07 PROCEDURE — 36415 COLL VENOUS BLD VENIPUNCTURE: CPT

## 2020-08-07 PROCEDURE — 83550 IRON BINDING TEST: CPT

## 2020-08-07 PROCEDURE — 71045 X-RAY EXAM CHEST 1 VIEW: CPT

## 2020-08-07 PROCEDURE — 85379 FIBRIN DEGRADATION QUANT: CPT

## 2020-08-07 PROCEDURE — 80307 DRUG TEST PRSMV CHEM ANLYZR: CPT

## 2020-08-07 PROCEDURE — G0378 HOSPITAL OBSERVATION PER HR: HCPCS

## 2020-08-07 PROCEDURE — 80053 COMPREHEN METABOLIC PANEL: CPT

## 2020-08-07 PROCEDURE — 83540 ASSAY OF IRON: CPT

## 2020-08-07 PROCEDURE — 96374 THER/PROPH/DIAG INJ IV PUSH: CPT

## 2020-08-07 PROCEDURE — 83880 ASSAY OF NATRIURETIC PEPTIDE: CPT

## 2020-08-07 PROCEDURE — 84484 ASSAY OF TROPONIN QUANT: CPT

## 2020-08-07 PROCEDURE — 96375 TX/PRO/DX INJ NEW DRUG ADDON: CPT

## 2020-08-07 PROCEDURE — 83735 ASSAY OF MAGNESIUM: CPT

## 2020-08-07 PROCEDURE — 85610 PROTHROMBIN TIME: CPT

## 2020-08-07 RX ADMIN — MORPHINE SULFATE PRN MG: 2 INJECTION, SOLUTION INTRAMUSCULAR; INTRAVENOUS at 17:03

## 2020-08-07 RX ADMIN — ATORVASTATIN CALCIUM SCH MG: 40 TABLET, FILM COATED ORAL at 20:15

## 2020-08-07 RX ADMIN — PREGABALIN SCH MG: 75 CAPSULE ORAL at 18:45

## 2020-08-07 RX ADMIN — PREGABALIN SCH MG: 75 CAPSULE ORAL at 20:15

## 2020-08-07 RX ADMIN — MORPHINE SULFATE PRN MG: 2 INJECTION, SOLUTION INTRAMUSCULAR; INTRAVENOUS at 18:40

## 2020-08-07 RX ADMIN — MORPHINE SULFATE PRN MG: 2 INJECTION, SOLUTION INTRAMUSCULAR; INTRAVENOUS at 21:07

## 2020-08-07 NOTE — PHYS DOC
Past Medical History


Past Medical History:  Asthma, CAD, CHF, CVA, High Cholesterol, Hypertension, 

MI, Other


Additional Past Medical Histor:  VTACH, Drug abuse-Methamphetamine


Past Surgical History:  Pacemaker, Splenectomy, Other


Additional Past Surgical Histo:  12 cardiac stents, multiple cardiac 

catheterizations,WITH DEFIB


Smoking Status:  Current Every Day Smoker


Alcohol Use:  Rarely


Drug Use:  None


Social History Narrative:  "I haven't used in 2 months"





General Adult


EDM:


Chief Complaint:  CHEST PAIN





HPI:


HPI:


60-year-old male presents emergency department today with pressure sensation in 

the chest that radiates to the left shoulder.  It goes down his arm as well.  

Started about 1445.  He took a full aspirin and 3 nitros which help mildly.  His

pain is moderate to severe.  He has a history of multiple cardiac stents and 

cardiac catheterizations.  He has a defibrillator in place.





Review of systems negative for abdominal pain vomiting diaphoresis fevers or 

chills.  All other review of systems negative.





ED course: 60-year-old male presenting with chest pain.  EKG obtained and 

reviewed by myself shows sinus rhythm with a regular rate.  ST segments show ST 

elevation in the anterior leads.  Deep Q waves in the anterior leads as well.  

Negative scar Bosa criteria I was able to pull up an old EKG back in February 

which was totally different.  I called this as a STEMI and notify Dr. Vance 

our cardiologist who evaluated the EKG and compared to a more recent EKG he was 

able to pull up in July which is very similar.  He at that point canceled the 

STEMI.  Chest x-ray and blood work ordered.  IV fentanyl given for pain control.

 Chest x-ray unremarkable.  Blood work shows normal troponin.  Will admit the 

patient to the hospitalist team with cardiology consultation.  Basic bridge 

orders placed.





Heart Score:


HEART Score for Chest Pain:  








HEART Score for Chest Pain Response (Comments) Value


 


History Moderately Suspicious 1


 


ECG Nonspecific Repolarizatio 1


 


Age >45 - < 65 1


 


Risk Factors >3 Risk Factors or Hx CAD 2


 


Total  5








Risk Factors:


Risk Factors:  DM, Current or recent (<one month) smoker, HTN, HLP, family 

history of CAD, obesity.


Risk Scores:


Score 0 - 3:  2.5% MACE over next 6 weeks - Discharge Home


Score 4 - 6:  20.3% MACE over next 6 weeks - Admit for Clinical Observation


Score 7 - 10:  72.7% MACE over next 6 weeks - Early Invasive Strategies





Current Medications:





Current Medications








 Medications


  (Trade)  Dose


 Ordered  Sig/Gwen  Start Time


 Stop Time Status Last Admin


Dose Admin


 


 Fentanyl Citrate


  (Fentanyl 2ml


 Vial)  25 mcg  1X  ONCE  8/7/20 15:30


 8/7/20 15:31 DC 8/7/20 15:42


25 MCG


 


 Ondansetron HCl


  (Zofran)  4 mg  1X  ONCE  8/7/20 15:30


 8/7/20 15:31 DC 8/7/20 15:42


4 MG


 


 Sodium Chloride  500 ml @ 


 500 mls/hr  1X  ONCE  8/7/20 16:00


 8/7/20 16:59  8/7/20 15:43


500 MLS/HR











Allergies:


Allergies:





Allergies








Coded Allergies Type Severity Reaction Last Updated Verified


 


  No Known Medication Allergies Allergy Unknown  8/7/20 Yes


 


  albuterol Adverse Reaction Intermediate  1/22/20 Yes


 


  ibuprofen Adverse Reaction Intermediate Nausea and Vomiting 1/22/20 Yes











Physical Exam:


PE:





Constitutional: Well developed, well nourished, no acute distress, non-toxic 

appearance. []


HENT: Normocephalic, atraumatic, bilateral external ears normal, oropharynx 

moist, no oral exudates, nose normal. []


Eyes: PERRLA, EOMI, conjunctiva normal, no discharge. [] 


Neck: Normal range of motion, no tenderness, supple, no stridor. [] 


Cardiovascular: Tachycardia with regular rhythm, no murmur []


Lungs & Thorax:  Bilateral breath sounds clear to auscultation []


Abdomen: Bowel sounds normal, soft, no tenderness, no masses, no pulsatile 

masses. [] 


Skin: Warm, dry, no erythema, no rash. [] 


Back: No tenderness, no CVA tenderness. [] 


Extremities: No tenderness, no cyanosis, no clubbing, ROM intact, no edema. [] 


Neurologic: Alert and oriented X 3, normal motor function, normal sensory 

function, no focal deficits noted. []


Psychologic: Affect normal, judgement normal, mood normal. []





Current Patient Data:


Vital Signs:





                                   Vital Signs








  Date Time  Temp Pulse Resp B/P (MAP) Pulse Ox O2 Delivery O2 Flow Rate FiO2


 


8/7/20 15:14 97.8 92 18 134/65 (88) 100 Room Air  





 97.8       











EKG:


EKG:


[]





Radiology/Procedures:


Radiology/Procedures:


[]





Course & Med Decision Making:


Course & Med Decision Making


Pertinent Labs and Imaging studies reviewed. (See chart for details)





[]





Dragon Disclaimer:


Dragon Disclaimer:


This electronic medical record was generated, in whole or in part, using a voice

 recognition dictation system.





Departure


Departure


Impression:  


   Primary Impression:  


   Chest pain


Disposition:  09 ADMITTED AS INPATIENT


Admitting Physician:  HIMS


Condition:  STABLE


Referrals:  


NO PCP (PCP)





Justicifation of Admission Dx:


Justifications for Admission:


Justification of Admission Dx:  Yes


Sepsis:  Bacteremia


Angina:  Symp at Rest











KRISTEL FINLEY MD                Aug 7, 2020 15:55

## 2020-08-07 NOTE — EKG
[address] [place] [address]

Test Date:    2020               Test Time:    15:15:56

Pat Name:     JOB RIOS           Department:   

Patient ID:   [id number]           Room:          

Gender:       M                        Technician:   

:          1960               Requested By: GAVINO SANZ

Order Number: [id number]           Reading MD:     

                                 Measurements

Intervals                              Axis          

Rate:         105                      P:            61

WV:           144                      QRS:          -20

QRSD:         136                      T:            138

QT:           368                                    

QTc:          491                                    

                           Interpretive Statements

SINUS TACHYCARDIA

CONSIDER WPW, TYPE B

LEFTWARD AXIS

QRS(T) CONTOUR ABNORMALITY

CONSIDER ANTEROLATERAL MYOCARDIAL DAMAGE

ABNORMAL ECG

RI6.02

No previous ECG available for comparison

## 2020-08-07 NOTE — HP
ADMIT DATE:  08/07/2020



CHIEF COMPLAINT:  Chest pain.



HISTORY OF PRESENT ILLNESS:  The patient is a pleasant 60-year-old male who has

a history of IV drug abuse.  He also states he has 12 previous stents.  He also

has a defibrillator and history of stroke, hyperlipidemia, hypertension, history

of V-tach, methamphetamine abuse and basically were concerned he could be having

an acute coronary syndrome.  He rates his symptoms at 9/10.  He keeps asking for

pain meds as well.  He also wants to eat.  I discussed the case with ER

physician.  To be safe, we are going to admit the patient and consult

Cardiology.



PAST MEDICAL HISTORY:  IV drug abuse, asthma, CAD,  previous 12 stents according

to the patient, defibrillator, stroke, hyperlipidemia, hypertension, myocardial

infarction, methamphetamine abuse, splenectomy, tobacco abuse.



ALLERGIES:  TYLENOL, ALBUTEROL, AND IBUPROFEN.



FAMILY HISTORY:  Coronary artery disease.



SOCIAL HISTORY:  He drinks, smokes and takes drugs.  States he has not taken any

drugs in 2 months.



MEDICATIONS:  Reviewed, please refer to the MRAD.



REVIEW OF SYSTEMS:  

GENERAL:  No history of weight change, weakness or fevers.

SKIN:  No bruising, hair changes or rashes.

EYES:  No blurred, double or loss of vision.

NOSE AND THROAT:  No history of nosebleeds, hoarseness or sore throat.

HEART:  He complains of chest pain.

LUNGS:  Denies cough, hemoptysis, wheezing or shortness of breath.

GASTROINTESTINAL:  Denies changes in appetite, nausea, vomiting, diarrhea or

constipation.

GENITOURINARY:  No history of frequency, urgency, hesitancy or nocturia.

NEUROLOGIC:  Denies history of numbness, tingling, tremor or weakness.

PSYCHIATRIC:  No history of panic, anxiety or depression.

ENDOCRINE:  No history of heat or cold intolerance, polyuria or polydipsia.

EXTREMITIES:  Denies muscle weakness, joint pain, pain on walking or stiffness.



PHYSICAL EXAMINATION:

VITALS:  Within normal limits and are stable.

GENERAL:  No apparent distress.  Alert and oriented.

HEENT:  Normal cephalic atraumatic, external auditory canals are patent

EYES:  Extraocular muscles are intact, pupils are equally round and reactive to

light and accommodation

MUSCULOSKELETAL:  Well developed, well nourished, good range of motion

ENDOCRINE:  No thyromegaly was palpated

LYMPHATICS:  No cervical chain or axillary nodes were noted

HEMATOPOIETIC:  No bruising

NECK:  Supple, no JVD, no thyromegaly was noted.

LUNGS:  Clear to auscultation in all lung fields without rhonchi or wheezing.

HEART:  RRR, S1, S2 present.  Peripheral pulses intact, no obvious murmurs were

noted.

ABDOMEN:  Soft, nontender.  Positive bowel sounds no organomegaly, normal bowel

sounds.

EXTREMITIES:  Without any cyanosis, clubbing, or edema.  Pedal pulses intact,

Homans sign is negative.

NEUROLOGIC:  Normal speech, normal tone.  A & O x3, moves all extremities, no

obvious focal deficits.

PSYCHIATRIC:  Normal affect, normal mood.  Stable.

SKIN:  No ulcerations or rashes, good skin turgor, no jaundice.

VASCULAR:  Good capillary refill, neurovascular bundle appears to be intact.



LABORATORY DATA:  Troponin is 0.  BNP slightly high at 428.  INR is 1.1. 

Hemoglobin is low at 8.1.  Drug screen positive for methamphetamine.  Urinalysis

negative.  Chest x-ray shows no acute process.



ASSESSMENT AND PLAN:  Chest pain with known coronary artery disease.  The

patient has been admitted.  We will check serial enzymes, serial EKGs.  Consult

Cardiology.  Home meds, DVT prophylaxis.  Full code.  Long-term prognosis is

guarded.

 



______________________________

LIANG GIL DO



DR:  INÉS/juarez  JOB#:  [id number] / [id number]

DD:  08/07/2020 18:20  DT:  08/07/2020 18:35

## 2020-08-07 NOTE — RAD
AP portable chest  radiograph 8/7/2020

 

Clinical History: Chest pain.

 

An AP erect portable digital radiograph of the chest was obtained.

 

Comparison study is dated 7/9/2020.

 

A pacemaker is unchanged in position. The cardiac silhouette is mildly 

enlarged. The thoracic aorta is mildly tortuous. No acute pulmonary 

infiltrate is seen. No pleural effusion or pneumothorax is noted. The 

osseous structures are grossly intact.

 

Impression: No acute abnormality is seen

 

Electronically signed by: Jorge Zazueta MD (8/7/2020 3:42 PM) [id number]

## 2020-08-08 VITALS — DIASTOLIC BLOOD PRESSURE: 50 MMHG | SYSTOLIC BLOOD PRESSURE: 94 MMHG

## 2020-08-08 VITALS — SYSTOLIC BLOOD PRESSURE: 87 MMHG | DIASTOLIC BLOOD PRESSURE: 46 MMHG

## 2020-08-08 VITALS — DIASTOLIC BLOOD PRESSURE: 65 MMHG | SYSTOLIC BLOOD PRESSURE: 96 MMHG

## 2020-08-08 VITALS — SYSTOLIC BLOOD PRESSURE: 94 MMHG | DIASTOLIC BLOOD PRESSURE: 57 MMHG

## 2020-08-08 VITALS — SYSTOLIC BLOOD PRESSURE: 94 MMHG | DIASTOLIC BLOOD PRESSURE: 56 MMHG

## 2020-08-08 VITALS — DIASTOLIC BLOOD PRESSURE: 60 MMHG | SYSTOLIC BLOOD PRESSURE: 100 MMHG

## 2020-08-08 LAB
ANION GAP SERPL CALC-SCNC: 9 MMOL/L (ref 6–14)
BASOPHILS # BLD AUTO: 0.1 X10^3/UL (ref 0–0.2)
BASOPHILS NFR BLD: 3 % (ref 0–3)
BUN SERPL-MCNC: 12 MG/DL (ref 8–26)
CALCIUM SERPL-MCNC: 8.3 MG/DL (ref 8.5–10.1)
CHLORIDE SERPL-SCNC: 102 MMOL/L (ref 98–107)
CO2 SERPL-SCNC: 26 MMOL/L (ref 21–32)
CREAT SERPL-MCNC: 0.9 MG/DL (ref 0.7–1.3)
EOSINOPHIL NFR BLD: 0.2 X10^3/UL (ref 0–0.7)
EOSINOPHIL NFR BLD: 5 % (ref 0–3)
ERYTHROCYTE [DISTWIDTH] IN BLOOD BY AUTOMATED COUNT: 17 % (ref 11.5–14.5)
GFR SERPLBLD BASED ON 1.73 SQ M-ARVRAT: 86.1 ML/MIN
GLUCOSE SERPL-MCNC: 114 MG/DL (ref 70–99)
HCT VFR BLD CALC: 24.4 % (ref 39–53)
HGB BLD-MCNC: 7.9 G/DL (ref 13–17.5)
LYMPHOCYTES # BLD: 1.2 X10^3/UL (ref 1–4.8)
LYMPHOCYTES NFR BLD AUTO: 34 % (ref 24–48)
MCH RBC QN AUTO: 25 PG (ref 25–35)
MCHC RBC AUTO-ENTMCNC: 32 G/DL (ref 31–37)
MCV RBC AUTO: 76 FL (ref 79–100)
MONO #: 0.5 X10^3/UL (ref 0–1.1)
MONOCYTES NFR BLD: 14 % (ref 0–9)
NEUT #: 1.5 X10^3/UL (ref 1.8–7.7)
NEUTROPHILS NFR BLD AUTO: 44 % (ref 31–73)
PLATELET # BLD AUTO: 308 X10^3/UL (ref 140–400)
POTASSIUM SERPL-SCNC: 3.8 MMOL/L (ref 3.5–5.1)
RBC # BLD AUTO: 3.22 X10^6/UL (ref 4.3–5.7)
SODIUM SERPL-SCNC: 137 MMOL/L (ref 136–145)
WBC # BLD AUTO: 3.4 X10^3/UL (ref 4–11)

## 2020-08-08 RX ADMIN — MORPHINE SULFATE PRN MG: 2 INJECTION, SOLUTION INTRAMUSCULAR; INTRAVENOUS at 21:44

## 2020-08-08 RX ADMIN — ASPIRIN SCH MG: 81 TABLET, COATED ORAL at 09:05

## 2020-08-08 RX ADMIN — PREGABALIN SCH MG: 75 CAPSULE ORAL at 21:43

## 2020-08-08 RX ADMIN — GABAPENTIN SCH MG: 300 CAPSULE ORAL at 14:17

## 2020-08-08 RX ADMIN — METOPROLOL SUCCINATE SCH MG: 25 TABLET, EXTENDED RELEASE ORAL at 09:00

## 2020-08-08 RX ADMIN — Medication SCH CAP: at 09:00

## 2020-08-08 RX ADMIN — PREGABALIN SCH MG: 75 CAPSULE ORAL at 09:06

## 2020-08-08 RX ADMIN — MORPHINE SULFATE PRN MG: 2 INJECTION, SOLUTION INTRAMUSCULAR; INTRAVENOUS at 14:18

## 2020-08-08 RX ADMIN — MORPHINE SULFATE PRN MG: 2 INJECTION, SOLUTION INTRAMUSCULAR; INTRAVENOUS at 11:31

## 2020-08-08 RX ADMIN — ATORVASTATIN CALCIUM SCH MG: 40 TABLET, FILM COATED ORAL at 21:44

## 2020-08-08 RX ADMIN — MORPHINE SULFATE PRN MG: 2 INJECTION, SOLUTION INTRAMUSCULAR; INTRAVENOUS at 00:14

## 2020-08-08 RX ADMIN — MORPHINE SULFATE PRN MG: 2 INJECTION, SOLUTION INTRAMUSCULAR; INTRAVENOUS at 04:24

## 2020-08-08 RX ADMIN — MORPHINE SULFATE PRN MG: 2 INJECTION, SOLUTION INTRAMUSCULAR; INTRAVENOUS at 17:10

## 2020-08-08 RX ADMIN — MORPHINE SULFATE PRN MG: 2 INJECTION, SOLUTION INTRAMUSCULAR; INTRAVENOUS at 09:07

## 2020-08-08 RX ADMIN — GABAPENTIN SCH MG: 300 CAPSULE ORAL at 21:43

## 2020-08-08 RX ADMIN — CLOPIDOGREL BISULFATE SCH MG: 75 TABLET ORAL at 09:05

## 2020-08-08 RX ADMIN — MORPHINE SULFATE PRN MG: 2 INJECTION, SOLUTION INTRAMUSCULAR; INTRAVENOUS at 19:28

## 2020-08-08 NOTE — PDOC
[employer] PROGRESS NOTE


Date of Service


DOS:


DATE: 8/8/20 


TIME: 13:22





Chief Complaint


Chief Complaint


A/P:


Chest pain - high risk ACS given his history and lateral TWI, will trend 

troponins, telemetry, consult cardiology


Ischemic cardiomyopathy -also with nonischemic cardiomyopathy due to 

methamphetamine use historically.  Fluid dynamics seems stable.


CAD - s/p x4 cardiac catherizations. multiple stents in the past. At least 12 

stents reported. 


VT s/p AICD - medtronic. Will consult cardiology. needs device interrogation


HTN - cont meds


HLD - cont meds


Hx of IV meth use - used within 48 of admission. Tox screen positive and sees a 

counselor through [facility]


Tobaccoism -counseled on cessation he is nondistended nicotine patch states he 

will need to start smoking as soon as he leaves


Anxiety/agitation -calmed with verbal counseling


History of Drug seeking behavior -advised with his substance abuse history is 

high risk and should not be given an opioid prescription on discharge and to 

minimize opioids while inpatient


Anemia - likely from chronic substance abuse, will need monitoring outpatient


Leukopenia - likely 2/2 above, no signs of infection





FEN - Cardiac


PPX - Lovenox


FULL CODE


Dispo - inpatient for high risk ACS





History of Present Illness


History of Present Illness


Mr Bishop is a 59yo M w/ PMHx CAD x multiple GENNY, CHF, HTN, HLD, VT s/p AICD, 

COPD/asthma, GERD, OA, anxiety, IV methamphetamine abuse c/o chest pain with 

radiation to left arm and jaw. 2 days prior to admission was injecting 

methamphetamine, admitted for concern for acute coronary syndrome.  He rates his

symptoms at 9/10.  He keeps asking for pain meds.


EKG with lateral TWI and leftward axis, no STEMI.





Historically with multiple PCIs- LHC at [facility] Jan 2018 showed showed patent

mid LAD stent, patent 1st diagonal stent, total chronic occlusion of first 

obtuse marginal with territory infarct but no indication for PCI, patent stent 

in left AV groove artery, patent stent in LPDA, non-dominant 100 % stenosis of 

proximal RCA. Seen by cardiology here in January.


He does continue to smoke states he smokes less than half pack a day, he is 

asking me for food and would like to talk about pain medications that he can go 

home on.  I advised him that given his history of substance abuse this is ill 

advised. 


Admit for further work-up of his coronary artery disease





CXR:


A pacemaker is unchanged in position. The cardiac silhouette is mildly enlarged.

The thoracic aorta is mildly tortuous. No acute pulmonary infiltrate is seen. No

pleural effusion or pneumothorax is noted. The osseous structures are grossly 

intact.


Impression: No acute abnormality is seen





Troponin negative x3 WBC down to 3.4, Hb down to 7.9 platelets 308 NA 137K3.8 

BUN 12, CR 0.9 glucose 114.  Initially during her interview today he yells at me

and says there is something wrong with my chest.  He adamantly denies 

methamphetamine use, upon further questioning he admits to using 2 days prior to

admission and notes he did not think this would show up on the drug screen.  He 

typically injects multiple times a day does not reuse or share needles and uses 

cotton or cigarette filters and hot water to mix with methamphetamine crystals 

heats it up in the microwave and then injects he has been doing this for years. 

He is asking for help and would like to go to inpatient rehab if possible today.

 Medically he shows no signs of abnormalities that require inpatient care no 

infectious signs.





Vitals/I&O


Vitals/I&O:





                                   Vital Signs








  Date Time  Temp Pulse Resp B/P (MAP) Pulse Ox O2 Delivery O2 Flow Rate FiO2


 


8/8/20 12:01     96 Room Air  


 


8/8/20 11:31   16     


 


8/8/20 10:28 97.8 94  94/50 (65)    





 97.8       














                                    I & O   


 


 8/7/20 8/7/20 8/8/20





 15:00 23:00 07:00


 


Intake Total   400 ml


 


Balance   400 ml











Physical Exam


Lungs:  Clear





Labs


Labs:





Laboratory Tests








Test


 8/7/20


15:55 8/7/20


17:48 8/7/20


19:45 8/8/20


00:05


 


White Blood Count


 5.5 x10^3/uL


(4.0-11.0) 


 


 





 


Red Blood Count


 3.22 x10^6/uL


(4.30-5.70) 


 


 





 


Hemoglobin


 8.1 g/dL


(13.0-17.5) 


 


 





 


Hematocrit


 24.4 %


(39.0-53.0) 


 


 





 


Mean Corpuscular Volume 76 fL ()    


 


Mean Corpuscular Hemoglobin 25 pg (25-35)    


 


Mean Corpuscular Hemoglobin


Concent 33 g/dL


(31-37) 


 


 





 


Red Cell Distribution Width


 17.1 %


(11.5-14.5) 


 


 





 


Platelet Count


 340 x10^3/uL


(140-400) 


 


 





 


Neutrophils (%) (Auto) 71 % (31-73)    


 


Lymphocytes (%) (Auto) 18 % (24-48)    


 


Monocytes (%) (Auto) 9 % (0-9)    


 


Eosinophils (%) (Auto) 2 % (0-3)    


 


Basophils (%) (Auto) 0 % (0-3)    


 


Neutrophils # (Auto)


 3.9 x10^3/uL


(1.8-7.7) 


 


 





 


Lymphocytes # (Auto)


 1.0 x10^3/uL


(1.0-4.8) 


 


 





 


Monocytes # (Auto)


 0.5 x10^3/uL


(0.0-1.1) 


 


 





 


Eosinophils # (Auto)


 0.1 x10^3/uL


(0.0-0.7) 


 


 





 


Basophils # (Auto)


 0.0 x10^3/uL


(0.0-0.2) 


 


 





 


Prothrombin Time


 13.7 SEC


(11.7-14.0) 


 


 





 


Prothromb Time International


Ratio 1.1 (0.8-1.1) 


 


 


 





 


Activated Partial


Thromboplast Time 35 SEC (24-38) 


 


 


 





 


D-Dimer (Camryn)


 0.46 ug/mlFEU


(0.00-0.50) 


 


 





 


Sodium Level


 136 mmol/L


(136-145) 


 


 





 


Potassium Level


 3.9 mmol/L


(3.5-5.1) 


 


 





 


Chloride Level


 101 mmol/L


() 


 


 





 


Carbon Dioxide Level


 24 mmol/L


(21-32) 


 


 





 


Anion Gap 11 (6-14)    


 


Blood Urea Nitrogen


 10 mg/dL


(8-26) 


 


 





 


Creatinine


 0.9 mg/dL


(0.7-1.3) 


 


 





 


Estimated GFR


(Cockcroft-Gault) 86.1 


 


 


 





 


BUN/Creatinine Ratio 11 (6-20)    


 


Glucose Level


 86 mg/dL


(70-99) 


 


 





 


Calcium Level


 8.7 mg/dL


(8.5-10.1) 


 


 





 


Magnesium Level


 1.9 mg/dL


(1.8-2.4) 


 


 





 


Total Bilirubin


 0.2 mg/dL


(0.2-1.0) 


 


 





 


Aspartate Amino Transf


(AST/SGOT) 13 U/L (15-37) 


 


 


 





 


Alanine Aminotransferase


(ALT/SGPT) 19 U/L (16-63) 


 


 


 





 


Alkaline Phosphatase


 86 U/L


() 


 


 





 


Troponin I Quantitative


 < 0.017 ng/mL


(0.000-0.055) 


 < 0.017 ng/mL


(0.000-0.055) < 0.017 ng/mL


(0.000-0.055)


 


NT-Pro-B-Type Natriuretic


Peptide 428 pg/mL


(0-124) 


 


 





 


Total Protein


 6.8 g/dL


(6.4-8.2) 


 


 





 


Albumin


 3.5 g/dL


(3.4-5.0) 


 


 





 


Albumin/Globulin Ratio 1.1 (1.0-1.7)    


 


Lipase


 216 U/L


() 


 


 





 


Urine Collection Type  Void   


 


Urine Color  Yellow   


 


Urine Clarity  Clear   


 


Urine pH  7.0 (<5.0-8.0)   


 


Urine Specific Gravity


 


 1.010


(1.000-1.030) 


 





 


Urine Protein


 


 Negative mg/dL


(NEG-TRACE) 


 





 


Urine Glucose (UA)


 


 Negative mg/dL


(NEG) 


 





 


Urine Ketones (Stick)


 


 Negative mg/dL


(NEG) 


 





 


Urine Blood  Negative (NEG)   


 


Urine Nitrite  Negative (NEG)   


 


Urine Bilirubin  Negative (NEG)   


 


Urine Urobilinogen Dipstick


 


 1.0 mg/dL (0.2


mg/dL) 


 





 


Urine Leukocyte Esterase  Negative (NEG)   


 


Urine RBC  0 /HPF (0-2)   


 


Urine WBC  1-4 /HPF (0-4)   


 


Urine Squamous Epithelial


Cells 


 Occ /LPF 


 


 





 


Urine Bacteria


 


 Few /HPF


(0-FEW) 


 





 


Urine Mucus  Slight /LPF   


 


Urine Sperm  Present /HPF   


 


Urine Opiates Screen  Neg (NEG)   


 


Urine Methadone Screen  Neg (NEG)   


 


Urine Barbiturates  Neg (NEG)   


 


Urine Phencyclidine Screen  Neg (NEG)   


 


Urine


Amphetamine/Methamphetamine 


 Pos (NEG) 


 


 





 


Urine Benzodiazepines Screen  Neg (NEG)   


 


Urine Cocaine Screen  Neg (NEG)   


 


Urine Cannabinoids Screen  Neg (NEG)   


 


Urine Ethyl Alcohol  Neg (NEG)   


 


Test


 8/8/20


03:00 


 


 





 


White Blood Count


 3.4 x10^3/uL


(4.0-11.0) 


 


 





 


Red Blood Count


 3.22 x10^6/uL


(4.30-5.70) 


 


 





 


Hemoglobin


 7.9 g/dL


(13.0-17.5) 


 


 





 


Hematocrit


 24.4 %


(39.0-53.0) 


 


 





 


Mean Corpuscular Volume 76 fL ()    


 


Mean Corpuscular Hemoglobin 25 pg (25-35)    


 


Mean Corpuscular Hemoglobin


Concent 32 g/dL


(31-37) 


 


 





 


Red Cell Distribution Width


 17.0 %


(11.5-14.5) 


 


 





 


Platelet Count


 308 x10^3/uL


(140-400) 


 


 





 


Neutrophils (%) (Auto) 44 % (31-73)    


 


Lymphocytes (%) (Auto) 34 % (24-48)    


 


Monocytes (%) (Auto) 14 % (0-9)    


 


Eosinophils (%) (Auto) 5 % (0-3)    


 


Basophils (%) (Auto) 3 % (0-3)    


 


Neutrophils # (Auto)


 1.5 x10^3/uL


(1.8-7.7) 


 


 





 


Lymphocytes # (Auto)


 1.2 x10^3/uL


(1.0-4.8) 


 


 





 


Monocytes # (Auto)


 0.5 x10^3/uL


(0.0-1.1) 


 


 





 


Eosinophils # (Auto)


 0.2 x10^3/uL


(0.0-0.7) 


 


 





 


Basophils # (Auto)


 0.1 x10^3/uL


(0.0-0.2) 


 


 





 


Sodium Level


 137 mmol/L


(136-145) 


 


 





 


Potassium Level


 3.8 mmol/L


(3.5-5.1) 


 


 





 


Chloride Level


 102 mmol/L


() 


 


 





 


Carbon Dioxide Level


 26 mmol/L


(21-32) 


 


 





 


Anion Gap 9 (6-14)    


 


Blood Urea Nitrogen


 12 mg/dL


(8-26) 


 


 





 


Creatinine


 0.9 mg/dL


(0.7-1.3) 


 


 





 


Estimated GFR


(Cockcroft-Gault) 86.1 


 


 


 





 


Glucose Level


 114 mg/dL


(70-99) 


 


 





 


Calcium Level


 8.3 mg/dL


(8.5-10.1) 


 


 














Assessment and Plan


Assessmemt and Plan


Problems


Medical Problems:


(1) Chest pain


Status: Acute  











Comment


Review of Relevant


I have reviewed the following items luke (where applicable) has been applied.


Medications:





Current Medications








 Medications


  (Trade)  Dose


 Ordered  Sig/Gwen


 Route


 PRN Reason  Start Time


 Stop Time Status Last Admin


Dose Admin


 


 Ondansetron HCl


  (Zofran)  4 mg  1X  ONCE


 IV


   8/7/20 15:30


 8/7/20 15:31 DC 8/7/20 15:42





 


 Fentanyl Citrate


  (Fentanyl 2ml


 Vial)  25 mcg  1X  ONCE


 IV


   8/7/20 15:30


 8/7/20 15:31 DC 8/7/20 15:42





 


 Sodium Chloride  500 ml @ 


 500 mls/hr  1X  ONCE


 IV


   8/7/20 16:00


 8/7/20 16:59 DC 8/7/20 15:43





 


 Morphine Sulfate


  (Morphine


 Sulfate)  2 mg  PRN Q2HR  PRN


 IV


 PAIN  8/7/20 16:45


 8/7/20 18:46 DC 8/7/20 18:40





 


 Aspirin


  (Ecotrin)  81 mg  DAILY


 PO


   8/8/20 09:00


    8/8/20 09:05





 


 Clopidogrel


 Bisulfate


  (Plavix)  75 mg  DAILY


 PO


   8/8/20 09:00


    8/8/20 09:05





 


 Pregabalin


  (Lyrica)  300 mg  BID


 PO


   8/7/20 18:45


    8/8/20 09:06





 


 Atorvastatin


 Calcium


  (Lipitor)  80 mg  QHS


 PO


   8/7/20 21:00


    8/7/20 20:15





 


 Morphine Sulfate


  (Morphine


 Sulfate)  2 mg  PRN Q2HR  PRN


 IV


 PAIN  8/7/20 18:30


    8/8/20 11:31














Justicifation of Admission Dx:


Justifications for Admission:


Justification of Admission Dx:  Yes


Sepsis:  Bacteremia


Angina:  Symp at Rest











FELICITY WHITLOCK MD         Aug 8, 2020 13:42

## 2020-08-09 VITALS — SYSTOLIC BLOOD PRESSURE: 117 MMHG | DIASTOLIC BLOOD PRESSURE: 68 MMHG

## 2020-08-09 VITALS — DIASTOLIC BLOOD PRESSURE: 63 MMHG | SYSTOLIC BLOOD PRESSURE: 99 MMHG

## 2020-08-09 VITALS — DIASTOLIC BLOOD PRESSURE: 56 MMHG | SYSTOLIC BLOOD PRESSURE: 102 MMHG

## 2020-08-09 VITALS — DIASTOLIC BLOOD PRESSURE: 54 MMHG | SYSTOLIC BLOOD PRESSURE: 108 MMHG

## 2020-08-09 VITALS — SYSTOLIC BLOOD PRESSURE: 96 MMHG | DIASTOLIC BLOOD PRESSURE: 47 MMHG

## 2020-08-09 LAB
ANION GAP SERPL CALC-SCNC: 9 MMOL/L (ref 6–14)
BASOPHILS # BLD AUTO: 0.1 X10^3/UL (ref 0–0.2)
BASOPHILS NFR BLD: 3 % (ref 0–3)
BUN SERPL-MCNC: 12 MG/DL (ref 8–26)
CALCIUM SERPL-MCNC: 8 MG/DL (ref 8.5–10.1)
CHLORIDE SERPL-SCNC: 103 MMOL/L (ref 98–107)
CO2 SERPL-SCNC: 26 MMOL/L (ref 21–32)
CREAT SERPL-MCNC: 1.1 MG/DL (ref 0.7–1.3)
EOSINOPHIL NFR BLD: 0.2 X10^3/UL (ref 0–0.7)
EOSINOPHIL NFR BLD: 7 % (ref 0–3)
ERYTHROCYTE [DISTWIDTH] IN BLOOD BY AUTOMATED COUNT: 16.9 % (ref 11.5–14.5)
GFR SERPLBLD BASED ON 1.73 SQ M-ARVRAT: 68.3 ML/MIN
GLUCOSE SERPL-MCNC: 121 MG/DL (ref 70–99)
HCT VFR BLD CALC: 23.9 % (ref 39–53)
HGB BLD-MCNC: 7.9 G/DL (ref 13–17.5)
LYMPHOCYTES # BLD: 1 X10^3/UL (ref 1–4.8)
LYMPHOCYTES NFR BLD AUTO: 35 % (ref 24–48)
MAGNESIUM SERPL-MCNC: 2 MG/DL (ref 1.8–2.4)
MCH RBC QN AUTO: 25 PG (ref 25–35)
MCHC RBC AUTO-ENTMCNC: 33 G/DL (ref 31–37)
MCV RBC AUTO: 76 FL (ref 79–100)
MONO #: 0.5 X10^3/UL (ref 0–1.1)
MONOCYTES NFR BLD: 16 % (ref 0–9)
NEUT #: 1.2 X10^3/UL (ref 1.8–7.7)
NEUTROPHILS NFR BLD AUTO: 40 % (ref 31–73)
PLATELET # BLD AUTO: 305 X10^3/UL (ref 140–400)
POTASSIUM SERPL-SCNC: 4.3 MMOL/L (ref 3.5–5.1)
RBC # BLD AUTO: 3.15 X10^6/UL (ref 4.3–5.7)
SODIUM SERPL-SCNC: 138 MMOL/L (ref 136–145)
WBC # BLD AUTO: 3 X10^3/UL (ref 4–11)

## 2020-08-09 RX ADMIN — GABAPENTIN SCH MG: 300 CAPSULE ORAL at 20:21

## 2020-08-09 RX ADMIN — ATORVASTATIN CALCIUM SCH MG: 40 TABLET, FILM COATED ORAL at 20:21

## 2020-08-09 RX ADMIN — Medication SCH CAP: at 08:30

## 2020-08-09 RX ADMIN — PREGABALIN SCH MG: 75 CAPSULE ORAL at 08:28

## 2020-08-09 RX ADMIN — MORPHINE SULFATE PRN MG: 2 INJECTION, SOLUTION INTRAMUSCULAR; INTRAVENOUS at 08:27

## 2020-08-09 RX ADMIN — METOPROLOL SUCCINATE SCH MG: 25 TABLET, EXTENDED RELEASE ORAL at 08:29

## 2020-08-09 RX ADMIN — GABAPENTIN SCH MG: 300 CAPSULE ORAL at 08:28

## 2020-08-09 RX ADMIN — ASPIRIN SCH MG: 81 TABLET, COATED ORAL at 08:28

## 2020-08-09 RX ADMIN — MORPHINE SULFATE PRN MG: 2 INJECTION, SOLUTION INTRAMUSCULAR; INTRAVENOUS at 02:19

## 2020-08-09 RX ADMIN — GABAPENTIN SCH MG: 300 CAPSULE ORAL at 14:55

## 2020-08-09 RX ADMIN — MORPHINE SULFATE PRN MG: 2 INJECTION, SOLUTION INTRAMUSCULAR; INTRAVENOUS at 04:59

## 2020-08-09 RX ADMIN — CLOPIDOGREL BISULFATE SCH MG: 75 TABLET ORAL at 08:28

## 2020-08-09 NOTE — PDOC
[employer] PROGRESS NOTE


Date of Service


DOS:


DATE: 8/9/20 


TIME: 08:28





Chief Complaint


Chief Complaint


A/P:


Chest pain - high risk ACS given his history and lateral TWI, will trend 

troponins, telemetry, consult cardiology


Ischemic cardiomyopathy -also with nonischemic cardiomyopathy due to 

methamphetamine use historically.  Fluid dynamics seems stable.


CAD - s/p x4 cardiac catherizations. multiple stents in the past. At least 12 

stents reported. 


VT s/p AICD - medtronic. Will consult cardiology. needs device interrogation


HTN - cont meds


HLD - cont meds


Hx of IV meth use - used within 48 of admission. Tox screen positive and sees a 

counselor through [facility]


Tobaccoism -counseled on cessation he is nondistended nicotine patch states he 

will need to start smoking as soon as he leaves


Anxiety/agitation -calmed with verbal counseling


History of Drug seeking behavior -advised with his substance abuse history is 

high risk and should not be given an opioid prescription on discharge and to 

minimize opioids while inpatient


Anemia - likely from chronic substance abuse, will need monitoring outpatient


Leukopenia - likely 2/2 above, no signs of infection





FEN - Cardiac


PPX - Lovenox


FULL CODE


Dispo - inpatient for high risk ACS





History of Present Illness


History of Present Illness


Mr Bishop is a 61yo M w/ PMHx CAD x multiple GENNY, CHF, HTN, HLD, VT s/p AICD, 

COPD/asthma, GERD, OA, anxiety, IV methamphetamine abuse c/o chest pain with 

radiation to left arm and jaw. 2 days prior to admission was injecting 

methamphetamine, admitted for concern for acute coronary syndrome.  He rates his

symptoms at 9/10.  He keeps asking for pain meds.


EKG with lateral TWI and leftward axis, no STEMI. CXR with no acute abnormality


Historically with multiple PCIs- C at [facility] Jan 2018 showed showed patent

mid LAD stent, patent 1st diagonal stent, total chronic occlusion of first 

obtuse marginal with territory infarct but no indication for PCI, patent stent 

in left AV groove artery, patent stent in LPDA, non-dominant 100 % stenosis of 

proximal RCA. Seen by cardiology here in January.


Admit for further work-up of his coronary artery disease





8/8: Troponin negative x3 WBC down to 3.4, Hb down to 7.9 platelets 308 NA 

137K3.8 BUN 12, CR 0.9 glucose 114.  Initially during her interview today he 

yells at me and says there is something wrong with my chest.  He adamantly 

denies methamphetamine use, upon further questioning he admits to using 2 days 

prior to admission and notes he did not think this would show up on the drug 

screen.  He typically injects multiple times a day does not reuse or share 

needles and uses cotton or cigarette filters and hot water to mix with 

methamphetamine crystals heats it up in the microwave and then injects he has 

been doing this for years.  He is asking for help and would like to go to 

inpatient rehab if possible today.  Medically he shows no signs of abnormalities

that require inpatient care no infectious signs.





WBC 3, Hb 7.9, platelets 305, iron returned 16 with elevated TIBC.  Still some 

chest pain.  He is amenable to iron infusion today as he cannot tolerate p.o. 

iron.  He wishes to go to acute substance abuse rehabilitation in [facility].  He is working with PAT team to coordinate this in the next 24 hours.





Plan:


IV iron


Transition to oral pain medications, stop IV morphine


COVID 19 swab for placement





Vitals/I&O


Vitals/I&O:





                                   Vital Signs








  Date Time  Temp Pulse Resp B/P (MAP) Pulse Ox O2 Delivery O2 Flow Rate FiO2


 


8/9/20 07:00 98.2 83 18 99/63 (75) 98 Room Air  





 98.2       














                                    I & O   


 


 8/8/20 8/8/20 8/9/20





 15:00 23:00 07:00


 


Intake Total  740 ml 540 ml


 


Balance  740 ml 540 ml











Physical Exam


General:  Alert, Oriented X3, Cooperative


Lungs:  Clear





Labs


Labs:





Laboratory Tests








Test


 8/9/20


04:50


 


White Blood Count


 3.0 x10^3/uL


(4.0-11.0)


 


Red Blood Count


 3.15 x10^6/uL


(4.30-5.70)


 


Hemoglobin


 7.9 g/dL


(13.0-17.5)


 


Hematocrit


 23.9 %


(39.0-53.0)


 


Mean Corpuscular Volume 76 fL () 


 


Mean Corpuscular Hemoglobin 25 pg (25-35) 


 


Mean Corpuscular Hemoglobin


Concent 33 g/dL


(31-37)


 


Red Cell Distribution Width


 16.9 %


(11.5-14.5)


 


Platelet Count


 305 x10^3/uL


(140-400)


 


Neutrophils (%) (Auto) 40 % (31-73) 


 


Lymphocytes (%) (Auto) 35 % (24-48) 


 


Monocytes (%) (Auto) 16 % (0-9) 


 


Eosinophils (%) (Auto) 7 % (0-3) 


 


Basophils (%) (Auto) 3 % (0-3) 


 


Neutrophils # (Auto)


 1.2 x10^3/uL


(1.8-7.7)


 


Lymphocytes # (Auto)


 1.0 x10^3/uL


(1.0-4.8)


 


Monocytes # (Auto)


 0.5 x10^3/uL


(0.0-1.1)


 


Eosinophils # (Auto)


 0.2 x10^3/uL


(0.0-0.7)


 


Basophils # (Auto)


 0.1 x10^3/uL


(0.0-0.2)


 


Sodium Level


 138 mmol/L


(136-145)


 


Potassium Level


 4.3 mmol/L


(3.5-5.1)


 


Chloride Level


 103 mmol/L


()


 


Carbon Dioxide Level


 26 mmol/L


(21-32)


 


Anion Gap 9 (6-14) 


 


Blood Urea Nitrogen


 12 mg/dL


(8-26)


 


Creatinine


 1.1 mg/dL


(0.7-1.3)


 


Estimated GFR


(Cockcroft-Gault) 68.3 





 


Glucose Level


 121 mg/dL


(70-99)


 


Calcium Level


 8.0 mg/dL


(8.5-10.1)


 


Magnesium Level


 2.0 mg/dL


(1.8-2.4)











Assessment and Plan


Assessmemt and Plan


Problems


Medical Problems:


(1) Chest pain


Status: Acute  











Comment


Review of Relevant


I have reviewed the following items luke (where applicable) has been applied.


Medications:





Current Medications








 Medications


  (Trade)  Dose


 Ordered  Sig/Gwen


 Route


 PRN Reason  Start Time


 Stop Time Status Last Admin


Dose Admin


 


 Aspirin


  (Ecotrin)  81 mg  DAILY


 PO


   8/8/20 09:00


    8/8/20 09:05





 


 Clopidogrel


 Bisulfate


  (Plavix)  75 mg  DAILY


 PO


   8/8/20 09:00


    8/8/20 09:05





 


 Gabapentin


  (Neurontin)  300 mg  TID


 PO


   8/8/20 14:00


    8/8/20 21:43














Justicifation of Admission Dx:


Justifications for Admission:


Justification of Admission Dx:  Yes


Sepsis:  Bacteremia


Angina:  Symp at Rest











FELICITY WHITLOCK MD         Aug 9, 2020 08:30

## 2020-08-10 VITALS — SYSTOLIC BLOOD PRESSURE: 109 MMHG | DIASTOLIC BLOOD PRESSURE: 48 MMHG

## 2020-08-10 VITALS — DIASTOLIC BLOOD PRESSURE: 65 MMHG | SYSTOLIC BLOOD PRESSURE: 115 MMHG

## 2020-08-10 RX ADMIN — GABAPENTIN SCH MG: 300 CAPSULE ORAL at 07:52

## 2020-08-10 RX ADMIN — CLOPIDOGREL BISULFATE SCH MG: 75 TABLET ORAL at 07:52

## 2020-08-10 RX ADMIN — METOPROLOL SUCCINATE SCH MG: 25 TABLET, EXTENDED RELEASE ORAL at 09:00

## 2020-08-10 RX ADMIN — ASPIRIN SCH MG: 81 TABLET, COATED ORAL at 07:52

## 2020-08-10 NOTE — NUR
SS following up with discharge planning. John from PAT team met with pt and discussed 
inpatient treatment. John following up with referrals. [facility] cannot accept pt at 
this time. Pt completed intake with [facility] in [place], [place] but facility unsure if bed is 
available at this time. Pt reported to John that he would discharge to home today and 
requested ride be scheduled through [employer], [phone]. SS contacted [employer] and 
scheduled ride for pt to return to home. Trip ID#[id number]. [employer] reported that they will 
 pt at 1522 today. SS will continue to follow for discharge planning.

## 2020-08-10 NOTE — PDOC3
Discharge Summary


Visit Information


Date of Admission:  Aug 7, 2020


Date of Discharge:  Aug 10, 2020


Final Diagnosis


Chest pain - high risk ACS given his history and lateral TWI, will trend 

troponins, telemetry, consult cardiology


Ischemic cardiomyopathy -also with nonischemic cardiomyopathy due to 

methamphetamine use historically.  Fluid dynamics seems stable.


CAD - s/p x4 cardiac catherizations. multiple stents in the past. At least 12 

stents reported. 


VT s/p AICD - medtronic. Will consult cardiology. needs device interrogation


HTN - cont meds


HLD - cont meds


Hx of IV meth use - used within 48 of admission. Tox screen positive and sees a 

counselor through [facility]


Tobaccoism -counseled on cessation he is nondistended nicotine patch states he 

will need to start smoking as soon as he leaves


Anxiety/agitation -calmed with verbal counseling


History of Drug seeking behavior -advised with his substance abuse history is 

high risk and should not be given an opioid prescription on discharge and to 

minimize opioids while inpatient


Anemia - likely from chronic substance abuse, will need monitoring outpatient


Leukopenia - likely 2/2 above, no signs of infection





 








Problems


Medical Problems:


(1) Chest pain


Status: Acute  








Brief Hospital Course


Allergies





                                    Allergies








Coded Allergies Type Severity Reaction Last Updated Verified


 


  acetaminophen Allergy Unknown  8/7/20 Yes


 


  albuterol Adverse Reaction Intermediate  1/22/20 Yes


 


  ibuprofen Adverse Reaction Intermediate Nausea and Vomiting 1/22/20 Yes








Vital Signs





Vital Signs








  Date Time  Temp Pulse Resp B/P (MAP) Pulse Ox O2 Delivery O2 Flow Rate FiO2


 


8/10/20 11:00 97.9 92 18 115/65 (82) 100 Room Air  





 97.9       








Lab Results





Laboratory Tests








Test


 8/9/20


04:50 8/9/20


11:39


 


White Blood Count


 3.0 x10^3/uL


(4.0-11.0) 





 


Red Blood Count


 3.15 x10^6/uL


(4.30-5.70) 





 


Hemoglobin


 7.9 g/dL


(13.0-17.5) 





 


Hematocrit


 23.9 %


(39.0-53.0) 





 


Mean Corpuscular Volume 76 fL ()  


 


Mean Corpuscular Hemoglobin 25 pg (25-35)  


 


Mean Corpuscular Hemoglobin


Concent 33 g/dL


(31-37) 





 


Red Cell Distribution Width


 16.9 %


(11.5-14.5) 





 


Platelet Count


 305 x10^3/uL


(140-400) 





 


Neutrophils (%) (Auto) 40 % (31-73)  


 


Lymphocytes (%) (Auto) 35 % (24-48)  


 


Monocytes (%) (Auto) 16 % (0-9)  


 


Eosinophils (%) (Auto) 7 % (0-3)  


 


Basophils (%) (Auto) 3 % (0-3)  


 


Neutrophils # (Auto)


 1.2 x10^3/uL


(1.8-7.7) 





 


Lymphocytes # (Auto)


 1.0 x10^3/uL


(1.0-4.8) 





 


Monocytes # (Auto)


 0.5 x10^3/uL


(0.0-1.1) 





 


Eosinophils # (Auto)


 0.2 x10^3/uL


(0.0-0.7) 





 


Basophils # (Auto)


 0.1 x10^3/uL


(0.0-0.2) 





 


Sodium Level


 138 mmol/L


(136-145) 





 


Potassium Level


 4.3 mmol/L


(3.5-5.1) 





 


Chloride Level


 103 mmol/L


() 





 


Carbon Dioxide Level


 26 mmol/L


(21-32) 





 


Anion Gap 9 (6-14)  


 


Blood Urea Nitrogen


 12 mg/dL


(8-26) 





 


Creatinine


 1.1 mg/dL


(0.7-1.3) 





 


Estimated GFR


(Cockcroft-Gault) 68.3 


 





 


Glucose Level


 121 mg/dL


(70-99) 





 


Calcium Level


 8.0 mg/dL


(8.5-10.1) 





 


Magnesium Level


 2.0 mg/dL


(1.8-2.4) 





 


Coronavirus (PCR)


 


 Not detected


(Not Detected)


 


SARS-CoV-2 Antigen (Rapid)


 


 Negative


(NEGATIVE)








Brief Hospital Course


 


Mr Bishop is a 59yo M w/ PMHx CAD x multiple GENNY, CHF, HTN, HLD, VT s/p AICD, 

COPD/asthma, GERD, OA, anxiety, IV methamphetamine abuse c/o chest pain with 

radiation to left arm and jaw. 2 days prior to admission was injecting 

methamphetamine, admitted for concern for acute coronary syndrome.  He rates his

symptoms at 9/10.  He keeps asking for pain meds.


EKG with lateral TWI and leftward axis, no STEMI. CXR with no acute abnormality


Historically with multiple PCIs- LHC at [facility] Jan 2018 showed showed patent

mid LAD stent, patent 1st diagonal stent, total chronic occlusion of first 

obtuse marginal with territory infarct but no indication for PCI, patent stent 

in left AV groove artery, patent stent in LPDA, non-dominant 100 % stenosis of 

proximal RCA. Seen by cardiology here in January.


Admit for further work-up of his coronary artery disease





 troponin negative x3  


some erratic behavior, suspect use in hospital, not proven,  he wanted inpatient

psych,  unable to fill,  needs outpatient f/u





Discharge Information


Condition at Discharge:  Improved


Follow Up:  Weeks


Disposition/Orders:  D/C to Home


Scheduled


Aspirin (Aspir 81) 81 Mg Tablet.dr, 1 TAB PO DAILY, #30 Ref 5 (Reported)


   Entered as Reported by: Madelyn Sy on 4/17/15 2306


   Last Action: Continued on 8/7/20 1828 by BARBI MANE RN


Clopidogrel Bisulfate (Plavix) 75 Mg Tablet, 75 MG PO DAILY for TO PREVENT BLOOD

CLOTS, #30 Ref 0 (Reported)


   Entered as Reported by: Madelyn Sy on 4/17/15 2308


   Last Action: Continued on 8/7/20 1828 by BARBI MANE RN


Metoprolol Succinate (Metoprolol Succinate ( Xl )) 25 Mg Tab.er.24h, 12.5 MG PO 

DAILY for FOR HYPERTENSION, #30 Ref 0 (Reported)


   Entered as Reported by: DEBBIE SLOAN [facility] on 1/22/20 0814


   Last Action: Continued on 8/7/20 1828 by BARBI MANE RN


Pregabalin (Lyrica) 300 Mg Capsule, 1 CAP PO BID, #60 Ref 2 (Reported)


   Entered as Reported by: TOM MONTE on 6/14/16 0412


   Last Action: Converted on 8/7/20 1828 by ABRBI MANE RN


Rosuvastatin Calcium (Crestor) 40 Mg Tablet, 40 MG PO HS for FOR CHOLESTEROL, 

#30 Ref 0 (Reported)


   Entered as Reported by: BARBI MANE RN on 8/7/20 1803


   Last Action: Converted on 8/7/20 1828 by BARBI MANE RN


Tiotropium Bromide (Spiriva) 18 Mcg Cap.w.dev, 1 CAP IH DAILY, #30 Ref 3 

(Reported)


   Entered as Reported by: TOM MONTE on 6/14/16 0412


   Last Action: Converted on 8/7/20 1828 by BARBI MANE RN





Scheduled PRN


Nitroglycerin (NITROGLYCERIN SubLingual) 0.4 Mg Tab.subl, 0.4 MG SL PRN Q5MIN 

PRN for CHEST PAIN, (Reported)


   Entered as Reported by: TOM MONTE on 6/14/16 0412


   Last Action: Reviewed on 8/7/20 1803 by BARBI MANE RN


Tramadol Hcl (Tramadol Hcl) 50 Mg Tablet, 100 MG PO PRN TID PRN for PAIN, #30


   Prescribed by: ELAYNE NUNEZ on 8/10/20 0844





Justicifation of Admission Dx:


Justifications for Admission:


Justification of Admission Dx:  Yes


Sepsis:  Bacteremia


Angina:  Symp at Rest











ELAYNE NUNEZ MD                 Aug 10, 2020 14:08

## 2020-09-01 ENCOUNTER — HOSPITAL ENCOUNTER (EMERGENCY)
Dept: HOSPITAL 61 - ER | Age: 60
LOS: 1 days | Discharge: HOME | End: 2020-09-02
Payer: MEDICAID

## 2020-09-01 VITALS — HEIGHT: 67 IN | BODY MASS INDEX: 23.43 KG/M2 | WEIGHT: 149.25 LBS

## 2020-09-01 DIAGNOSIS — J45.909: ICD-10-CM

## 2020-09-01 DIAGNOSIS — E78.00: ICD-10-CM

## 2020-09-01 DIAGNOSIS — I50.9: ICD-10-CM

## 2020-09-01 DIAGNOSIS — I25.2: ICD-10-CM

## 2020-09-01 DIAGNOSIS — F17.200: ICD-10-CM

## 2020-09-01 DIAGNOSIS — F15.10: Primary | ICD-10-CM

## 2020-09-01 DIAGNOSIS — Z95.5: ICD-10-CM

## 2020-09-01 DIAGNOSIS — R07.89: ICD-10-CM

## 2020-09-01 DIAGNOSIS — Z88.6: ICD-10-CM

## 2020-09-01 DIAGNOSIS — Z90.81: ICD-10-CM

## 2020-09-01 DIAGNOSIS — Z88.8: ICD-10-CM

## 2020-09-01 DIAGNOSIS — I11.0: ICD-10-CM

## 2020-09-01 DIAGNOSIS — Z95.0: ICD-10-CM

## 2020-09-01 DIAGNOSIS — I25.10: ICD-10-CM

## 2020-09-01 LAB
ALBUMIN SERPL-MCNC: 3.9 G/DL (ref 3.4–5)
ALBUMIN/GLOB SERPL: 1 {RATIO} (ref 1–1.7)
ALP SERPL-CCNC: 108 U/L (ref 46–116)
ALT SERPL-CCNC: 25 U/L (ref 16–63)
ANION GAP SERPL CALC-SCNC: 12 MMOL/L (ref 6–14)
ANISOCYTOSIS BLD QL SMEAR: (no result)
AST SERPL-CCNC: 17 U/L (ref 15–37)
BASOPHILS # BLD AUTO: 0.1 X10^3/UL (ref 0–0.2)
BASOPHILS NFR BLD: 2 % (ref 0–3)
BILIRUB SERPL-MCNC: 0.7 MG/DL (ref 0.2–1)
BUN SERPL-MCNC: 15 MG/DL (ref 8–26)
BUN/CREAT SERPL: 17 (ref 6–20)
CALCIUM SERPL-MCNC: 9.4 MG/DL (ref 8.5–10.1)
CHLORIDE SERPL-SCNC: 103 MMOL/L (ref 98–107)
CO2 SERPL-SCNC: 24 MMOL/L (ref 21–32)
CREAT SERPL-MCNC: 0.9 MG/DL (ref 0.7–1.3)
EOSINOPHIL NFR BLD: 0.2 X10^3/UL (ref 0–0.7)
EOSINOPHIL NFR BLD: 4 % (ref 0–3)
ERYTHROCYTE [DISTWIDTH] IN BLOOD BY AUTOMATED COUNT: 22.9 % (ref 11.5–14.5)
GFR SERPLBLD BASED ON 1.73 SQ M-ARVRAT: 86.1 ML/MIN
GLOBULIN SER-MCNC: 3.9 G/DL (ref 2.2–3.8)
GLUCOSE SERPL-MCNC: 77 MG/DL (ref 70–99)
HCT VFR BLD CALC: 32.2 % (ref 39–53)
HGB BLD-MCNC: 10.5 G/DL (ref 13–17.5)
LIPASE: 119 U/L (ref 73–393)
LYMPHOCYTES # BLD: 1.4 X10^3/UL (ref 1–4.8)
LYMPHOCYTES NFR BLD AUTO: 28 % (ref 24–48)
MAGNESIUM SERPL-MCNC: 2.1 MG/DL (ref 1.8–2.4)
MCH RBC QN AUTO: 26 PG (ref 25–35)
MCHC RBC AUTO-ENTMCNC: 33 G/DL (ref 31–37)
MCV RBC AUTO: 80 FL (ref 79–100)
MONO #: 0.8 X10^3/UL (ref 0–1.1)
MONOCYTES NFR BLD: 16 % (ref 0–9)
NEUT #: 2.5 X10^3/UL (ref 1.8–7.7)
NEUTROPHILS NFR BLD AUTO: 50 % (ref 31–73)
PLATELET # BLD AUTO: 297 X10^3/UL (ref 140–400)
PLATELET # BLD EST: ADEQUATE 10*3/UL
POTASSIUM SERPL-SCNC: 3.3 MMOL/L (ref 3.5–5.1)
PROT SERPL-MCNC: 7.8 G/DL (ref 6.4–8.2)
PROTHROMBIN TIME: 13.5 SEC (ref 11.7–14)
RBC # BLD AUTO: 4.05 X10^6/UL (ref 4.3–5.7)
SODIUM SERPL-SCNC: 139 MMOL/L (ref 136–145)
WBC # BLD AUTO: 5.1 X10^3/UL (ref 4–11)

## 2020-09-01 PROCEDURE — 93005 ELECTROCARDIOGRAM TRACING: CPT

## 2020-09-01 PROCEDURE — 85610 PROTHROMBIN TIME: CPT

## 2020-09-01 PROCEDURE — 84484 ASSAY OF TROPONIN QUANT: CPT

## 2020-09-01 PROCEDURE — 71045 X-RAY EXAM CHEST 1 VIEW: CPT

## 2020-09-01 PROCEDURE — 83880 ASSAY OF NATRIURETIC PEPTIDE: CPT

## 2020-09-01 PROCEDURE — 85025 COMPLETE CBC W/AUTO DIFF WBC: CPT

## 2020-09-01 PROCEDURE — 80307 DRUG TEST PRSMV CHEM ANLYZR: CPT

## 2020-09-01 PROCEDURE — 96361 HYDRATE IV INFUSION ADD-ON: CPT

## 2020-09-01 PROCEDURE — 83735 ASSAY OF MAGNESIUM: CPT

## 2020-09-01 PROCEDURE — 80053 COMPREHEN METABOLIC PANEL: CPT

## 2020-09-01 PROCEDURE — 83690 ASSAY OF LIPASE: CPT

## 2020-09-01 PROCEDURE — 99285 EMERGENCY DEPT VISIT HI MDM: CPT

## 2020-09-01 PROCEDURE — 85730 THROMBOPLASTIN TIME PARTIAL: CPT

## 2020-09-01 PROCEDURE — 83605 ASSAY OF LACTIC ACID: CPT

## 2020-09-01 PROCEDURE — 96374 THER/PROPH/DIAG INJ IV PUSH: CPT

## 2020-09-01 PROCEDURE — 36415 COLL VENOUS BLD VENIPUNCTURE: CPT

## 2020-09-01 NOTE — RAD
Exam: Chest one view

 

INDICATION: Chest pain

 

TECHNIQUE: Frontal view of the chest

 

Comparisons: 8/7/2020

 

FINDINGS:

Pacer with leads terminating the right atrium and ventricle.

 

Heart is mildly enlarged. Pulmonary vessels are within normal limits.

 

The lung and pleural spaces are clear.

 

Lucency is seen under the right hemidiaphragm.

 

IMPRESSION:

1.  Lucency seen under the right hemidiaphragm which is favored to 

represent interposition of colon rather than free air. Correlate with 

symptomatology to determine the need for further evaluation with CT of the

abdomen.

2.  No acute pulmonary process.

 

Electronically signed by: Sinan Trevino MD (9/1/2020 10:08 PM) [id number]

## 2020-09-01 NOTE — PHYS DOC
Past Medical History


Past Medical History:  Asthma, CAD, CHF, CVA, High Cholesterol, Hypertension, 

MI, Other


Additional Past Medical Histor:  VTACH, Drug abuse-Methamphetamine


Past Surgical History:  Pacemaker, Splenectomy, Other


Additional Past Surgical Histo:  12 cardiac stents, multiple cardiac 

catheterizations,WITH DEFIB


Smoking Status:  Current Every Day Smoker


Alcohol Use:  Rarely


Drug Use:  None





General Adult


EDM:


Chief Complaint:  CHEST PAIN





HPI:


HPI:


Patient is a 60-year-old male presenting to the ED with chest pain.  Patient has

a long history of methamphetamine use and cardiac history.  Patient describes 

his chest pain is deep and crushing that started 1 hour ago.  Patient denies any

abdominal pain, weakness in the limbs, sensory changes.  Patient states his 

current pain is similar to his previous MIs.





Review of Systems:


Review of Systems:


Constitutional: Denies fever or chills 


Eyes: Denies redness or eye pain 


HENT: Denies nasal congestion or sore throat


Respiratory: Denies cough or endorses shortness of breath 


Cardiovascular: Endorses chest pain or palpitations


GI: Denies abdominal pain, nausea, or vomiting


: Denies dysuria or hematuria


Musculoskeletal: Denies back pain or joint pain


Integument: Denies rash or skin lesions 


Neurologic: Denies headache, focal weakness or sensory changes





Complete systems were reviewed and found to be within normal limits, except as 

documented in this note.





Heart Score:


HEART Score for Chest Pain:  








HEART Score for Chest Pain Response (Comments) Value


 


History Moderately Suspicious 1


 


ECG Normal 0


 


Age >45 - < 65 1


 


Risk Factors >3 Risk Factors or Hx CAD 2


 


Troponin < Normal Limit 0


 


Total  4








Risk Factors:


Risk Factors:  DM, Current or recent (<one month) smoker, HTN, HLP, family 

history of CAD, obesity.


Risk Scores:


Score 0 - 3:  2.5% MACE over next 6 weeks - Discharge Home


Score 4 - 6:  20.3% MACE over next 6 weeks - Admit for Clinical Observation


Score 7 - 10:  72.7% MACE over next 6 weeks - Early Invasive Strategies





Allergies:


Allergies:





Allergies








Coded Allergies Type Severity Reaction Last Updated Verified


 


  acetaminophen Allergy Unknown  20 Yes


 


  albuterol Adverse Reaction Intermediate  20 Yes


 


  ibuprofen Adverse Reaction Intermediate Nausea and Vomiting 20 Yes











Physical Exam:


PE:


Constitutional: Appears thin, unkept, in moderate distress


HENT: Normocephalic, atraumatic, continuous lip smacking and oral 

fasciculations,


Eyes: Pupils equal and reacting to light, normal conjunctiva discharge, tenuous 

looking of the eye lids


Neck: Normal range of motion, no tenderness, supple


Lungs & Thorax:  Bilateral breath sounds clear to auscultation, no wheezing, 

patient intermittently tachycardic, no murmurs, pacemaker hardware present on 

left upper chest. 


Abdomen: Soft, no tenderness


Skin: Warm, dry, no erythema, no rash


Back: No tenderness, no CVA tenderness


Extremities: No tenderness, ROM intact, no edema


Neurologic: Alert and oriented X 3, normal motor function, normal sensory 

function, no focal deficits noted


Psychologic: Affect normal, judgment normal





EKG:


EK2020 at 21:06 hrs. heart rate 93 bpm, paced sinus rhythm,  ms, 

QT/QTc 406/508 ms





Course & Med Decision Making:


Course & Med Decision Making


Pertinent Labs and Imaging studies reviewed. (See chart for details)





Patient is a 60-year-old male presenting with acute onset chest pain that 

started 1 hour ago.  Patient has a long cardiac history as well as a long 

history of methamphetamine use.





Patient is hypokalemic with a potassium of 3.3, patient's troponin is within 

normal limits at less than 0.017, BNP is elevated at 1111 likely due to long 

cardiac history and ongoing methamphetamine use.





Patient was given lorazepam 1 mg IV push which provided much relief from anxiety

related to chest pain making acute methamphetamine toxicity the likely source of

this incident.





Dragon Disclaimer:


Dragon Disclaimer:


This electronic medical record was generated, in whole or in part, using a voice

recognition dictation system.





Departure


Departure


Impression:  


   Primary Impression:  


   Methamphetamine abuse


   Additional Impression:  


   Chest pain


   Qualified Codes:  R07.9 - Chest pain, unspecified


Disposition:   HOME, SELF-CARE


Condition:  STABLE


Referrals:  


NO PCP (PCP)


Patient Instructions:  Alcohol and Drug Addiction, Finding Treatment, Chest Pain

(Nonspecific), Easy-to-Read, Methamphetamine Abuse, Complications





Additional Instructions:  


Please call the following to help with your drug addiction:





- [facility] [phone] 


   (You must present with at least 5 days worth of your medications- 

prescriptions have been written but must be filled before going there)





- [facility] [phone]





- [facility] [phone]


Scripts


Rosuvastatin Calcium (CRESTOR) 40 Mg Tablet


1 TAB PO DAILY, #10 TAB


   Prov: ELROY NORIEGA DO         20 


Metoprolol Tartrate (METOPROLOL TARTRATE) 25 Mg Tablet


0.5 TAB PO BID for 10 Days, #10 TAB


   Prov: ELROY NORIEGA DO         20 


Aspirin (ASPIRIN) 81 Mg Tab.chew


1 TAB PO DAILY, #10 TAB


   Prov: ELROY NORIEGA DO         20 


Clopidogrel Bisulfate (CLOPIDOGREL) 75 Mg Tablet


1 TAB PO DAILY, #10 TAB


   Prov: ELROY NORIEGA DO         20





Justicifation of Admission Dx:


Justifications for Admission:


Justification of Admission Dx:  N/A


Angina:  Symp at Rest











ELROY NORIEGA DO              Sep 1, 2020 21:22

## 2020-09-02 VITALS — SYSTOLIC BLOOD PRESSURE: 103 MMHG | DIASTOLIC BLOOD PRESSURE: 59 MMHG

## 2020-09-02 LAB
AMPHETAMINE/METHAMPHETAMINE: (no result)
BARBITURATES UR-MCNC: (no result) UG/ML
BENZODIAZ UR-MCNC: (no result) UG/L
CANNABINOIDS UR-MCNC: (no result) UG/L
COCAINE UR-MCNC: (no result) NG/ML
METHADONE SERPL-MCNC: (no result) NG/ML
OPIATES UR-MCNC: (no result) NG/ML
PCP SERPL-MCNC: (no result) MG/DL

## 2020-09-03 NOTE — EKG
[facility]

              [address] [place] [address]

Test Date:    2020               Test Time:    21:06:59

Pat Name:     JOB RIOS           Department:   

Patient ID:   [id number]           Room:          

Gender:       M                        Technician:   

:          1960               Requested By: ELROY NORIEGA

Order Number: [id number]           Reading MD:     

                                 Measurements

Intervals                              Axis          

Rate:         93                       P:            52

RI:           124                      QRS:          -10

QRSD:         154                      T:            218

QT:           406                                    

QTc:          508                                    

                           Interpretive Statements

SINUS RHYTHM

LEFT ATRIAL ABNORMALITY

LEFTWARD AXIS

NON SPECIFIC INTRAVENTRICULAR BLOCK

ABNORMAL ECG

RI6.02

No previous ECG available for comparison

## 2020-10-07 ENCOUNTER — HOSPITAL ENCOUNTER (INPATIENT)
Dept: HOSPITAL 61 - ER | Age: 60
LOS: 3 days | Discharge: HOME | DRG: 287 | End: 2020-10-10
Attending: INTERNAL MEDICINE | Admitting: INTERNAL MEDICINE
Payer: MEDICAID

## 2020-10-07 VITALS — HEIGHT: 67 IN | WEIGHT: 154.32 LBS | BODY MASS INDEX: 24.22 KG/M2

## 2020-10-07 DIAGNOSIS — J44.9: ICD-10-CM

## 2020-10-07 DIAGNOSIS — M19.90: ICD-10-CM

## 2020-10-07 DIAGNOSIS — F17.210: ICD-10-CM

## 2020-10-07 DIAGNOSIS — I50.22: ICD-10-CM

## 2020-10-07 DIAGNOSIS — E78.5: ICD-10-CM

## 2020-10-07 DIAGNOSIS — F41.9: ICD-10-CM

## 2020-10-07 DIAGNOSIS — J45.909: ICD-10-CM

## 2020-10-07 DIAGNOSIS — Z86.73: ICD-10-CM

## 2020-10-07 DIAGNOSIS — I25.2: ICD-10-CM

## 2020-10-07 DIAGNOSIS — E87.5: ICD-10-CM

## 2020-10-07 DIAGNOSIS — I25.110: Primary | ICD-10-CM

## 2020-10-07 DIAGNOSIS — Z95.1: ICD-10-CM

## 2020-10-07 DIAGNOSIS — I25.5: ICD-10-CM

## 2020-10-07 DIAGNOSIS — I11.0: ICD-10-CM

## 2020-10-07 DIAGNOSIS — D64.9: ICD-10-CM

## 2020-10-07 LAB
ALBUMIN SERPL-MCNC: 3.7 G/DL (ref 3.4–5)
ALBUMIN/GLOB SERPL: 1 {RATIO} (ref 1–1.7)
ALP SERPL-CCNC: 98 U/L (ref 46–116)
ALT SERPL-CCNC: 20 U/L (ref 16–63)
ANION GAP SERPL CALC-SCNC: 10 MMOL/L (ref 6–14)
ANISOCYTOSIS BLD QL SMEAR: (no result)
AST SERPL-CCNC: 16 U/L (ref 15–37)
BASOPHILS # BLD AUTO: 0.1 X10^3/UL (ref 0–0.2)
BASOPHILS NFR BLD: 1 % (ref 0–3)
BILIRUB SERPL-MCNC: 0.2 MG/DL (ref 0.2–1)
BUN SERPL-MCNC: 9 MG/DL (ref 8–26)
BUN/CREAT SERPL: 8 (ref 6–20)
CALCIUM SERPL-MCNC: 9.1 MG/DL (ref 8.5–10.1)
CHLORIDE SERPL-SCNC: 103 MMOL/L (ref 98–107)
CO2 SERPL-SCNC: 26 MMOL/L (ref 21–32)
CREAT SERPL-MCNC: 1.1 MG/DL (ref 0.7–1.3)
EOSINOPHIL NFR BLD: 0.2 X10^3/UL (ref 0–0.7)
EOSINOPHIL NFR BLD: 4 % (ref 0–3)
ERYTHROCYTE [DISTWIDTH] IN BLOOD BY AUTOMATED COUNT: 22 % (ref 11.5–14.5)
GFR SERPLBLD BASED ON 1.73 SQ M-ARVRAT: 68.3 ML/MIN
GLUCOSE SERPL-MCNC: 88 MG/DL (ref 70–99)
HCT VFR BLD CALC: 33.3 % (ref 39–53)
HGB BLD-MCNC: 11 G/DL (ref 13–17.5)
HYPOCHROMIA BLD QL SMEAR: SLIGHT
LYMPHOCYTES # BLD: 1.3 X10^3/UL (ref 1–4.8)
LYMPHOCYTES NFR BLD AUTO: 25 % (ref 24–48)
MAGNESIUM SERPL-MCNC: 2.2 MG/DL (ref 1.8–2.4)
MCH RBC QN AUTO: 26 PG (ref 25–35)
MCHC RBC AUTO-ENTMCNC: 33 G/DL (ref 31–37)
MCV RBC AUTO: 79 FL (ref 79–100)
MONO #: 0.8 X10^3/UL (ref 0–1.1)
MONOCYTES NFR BLD: 16 % (ref 0–9)
NEUT #: 2.7 X10^3/UL (ref 1.8–7.7)
NEUTROPHILS NFR BLD AUTO: 54 % (ref 31–73)
PLATELET # BLD AUTO: 220 X10^3/UL (ref 140–400)
PLATELET # BLD EST: ADEQUATE 10*3/UL
POTASSIUM SERPL-SCNC: 5.2 MMOL/L (ref 3.5–5.1)
PROT SERPL-MCNC: 7.5 G/DL (ref 6.4–8.2)
RBC # BLD AUTO: 4.21 X10^6/UL (ref 4.3–5.7)
SODIUM SERPL-SCNC: 139 MMOL/L (ref 136–145)
WBC # BLD AUTO: 5 X10^3/UL (ref 4–11)

## 2020-10-07 PROCEDURE — 96374 THER/PROPH/DIAG INJ IV PUSH: CPT

## 2020-10-07 PROCEDURE — G0378 HOSPITAL OBSERVATION PER HR: HCPCS

## 2020-10-07 PROCEDURE — C1771 REP DEV, URINARY, W/SLING: HCPCS

## 2020-10-07 PROCEDURE — 36415 COLL VENOUS BLD VENIPUNCTURE: CPT

## 2020-10-07 PROCEDURE — 83735 ASSAY OF MAGNESIUM: CPT

## 2020-10-07 PROCEDURE — 71045 X-RAY EXAM CHEST 1 VIEW: CPT

## 2020-10-07 PROCEDURE — 93458 L HRT ARTERY/VENTRICLE ANGIO: CPT

## 2020-10-07 PROCEDURE — 99152 MOD SED SAME PHYS/QHP 5/>YRS: CPT

## 2020-10-07 PROCEDURE — 93005 ELECTROCARDIOGRAM TRACING: CPT

## 2020-10-07 PROCEDURE — C1892 INTRO/SHEATH,FIXED,PEEL-AWAY: HCPCS

## 2020-10-07 PROCEDURE — G0269 OCCLUSIVE DEVICE IN VEIN ART: HCPCS

## 2020-10-07 PROCEDURE — 80053 COMPREHEN METABOLIC PANEL: CPT

## 2020-10-07 PROCEDURE — 99285 EMERGENCY DEPT VISIT HI MDM: CPT

## 2020-10-07 PROCEDURE — 85025 COMPLETE CBC W/AUTO DIFF WBC: CPT

## 2020-10-07 PROCEDURE — C1769 GUIDE WIRE: HCPCS

## 2020-10-07 PROCEDURE — 83880 ASSAY OF NATRIURETIC PEPTIDE: CPT

## 2020-10-07 PROCEDURE — 84484 ASSAY OF TROPONIN QUANT: CPT

## 2020-10-07 PROCEDURE — 99153 MOD SED SAME PHYS/QHP EA: CPT

## 2020-10-07 NOTE — RAD
PORTABLE CHEST 1V

 

History: Chest pain

 

Comparison: September 1, 2020

 

Findings:

Single view of the chest is submitted. 

There is again triple lead left electronic cardiac device. Pericardial 

cardiac silhouette is again somewhat prominent although unchanged. There 

is no dependent pleural fluid, pneumothorax, or lobar infiltrate.

 

Impression: 

 

1.  No acute radiographic abnormality is identified.

 

Electronically signed by: Jeremiah Dubose MD (10/7/2020 8:21 PM) [facility]

## 2020-10-07 NOTE — PHYS DOC
Past Medical History


Past Medical History:  Asthma, CAD, CHF, CVA, High Cholesterol, Hypertension, 

MI, Other


Additional Past Medical Histor:  VTACH, Drug abuse-Methamphetamine


Past Surgical History:  Pacemaker, Splenectomy, Other


Additional Past Surgical Histo:  12 cardiac stents, multiple cardiac jonel

terizations,WITH DEFIB


Smoking Status:  Current Every Day Smoker


Alcohol Use:  Rarely


Drug Use:  Methamphetamine


Social History Narrative:  PT REPORTS LAST USED 6 WEEKS AGO





General Adult


EDM:


Chief Complaint:  CHEST PAIN-CARDIAC NATURE





HPI:


HPI:


60-year-old male past medical history significant for CABG, congestive heart 

failure with AICD was that was placed 2 months ago at [facility], COPD, bruxism and

methamphetamine abuse, presents the ED with complaints of left-sided tight and 

sharp chest pain that radiates to his neck and left arm for the past 45 minutes,

associated clamminess and diaphoresis.  Patient states his last carduac cath was

in the past year and there was a blockage in a back artery that cards cannot get

to.  Patient states his stress tests are always negative.  Reports last 

methamphetamine abuse was 5 weeks ago.  Took 3 sublingual nitro and 324 aspirin 

prior to arrival.  Denies any associated shortness of breath, fever, nausea or 

vomiting. Pt reports his AICD "did not go off."





Review of Systems:


Review of Systems:


Constitutional:   Denies fever or chills. []


Eyes:   Denies change in visual acuity. []


HENT:   Denies nasal congestion or sore throat. [] Or hemoptysis


Respiratory:   Denies cough or shortness of breath. [] 


Cardiovascular:   Denies syncope or edema. [] 


GI:   Denies abdominal pain, nausea, vomiting, bloody stools or diarrhea. [] 


:  Denies dysuria. [] Or hematuria


Musculoskeletal:   Denies back pain or joint pain. [] 


Integument:   Denies rash. [] 


Neurologic:   Denies headache, focal weakness or sensory changes. [] 


Endocrine:   Denies polyuria or polydipsia. [] 


Lymphatic:  Denies swollen glands. [] 


Psychiatric:  Denies depression or anxiety. []





Heart Score:


HEART Score for Chest Pain:  








HEART Score for Chest Pain Response (Comments) Value


 


History Moderately Suspicious 1


 


ECG Nonspecific Repolarizatio 1


 


Age >45 - < 65 1


 


Risk Factors >3 Risk Factors or Hx CAD 2


 


Troponin < Normal Limit 0


 


Total  5








Risk Factors:


Risk Factors:  DM, Current or recent (<one month) smoker, HTN, HLP, family his

tory of CAD, obesity.


Risk Scores:


Score 0 - 3:  2.5% MACE over next 6 weeks - Discharge Home


Score 4 - 6:  20.3% MACE over next 6 weeks - Admit for Clinical Observation


Score 7 - 10:  72.7% MACE over next 6 weeks - Early Invasive Strategies





Current Medications:





Current Medications








 Medications


  (Trade)  Dose


 Ordered  Sig/Gwen  Start Time


 Stop Time Status Last Admin


Dose Admin


 


 Morphine Sulfate


  (Morphine


 Sulfate)  4 mg  1X  ONCE  10/7/20 20:00


 10/7/20 20:01 DC 10/7/20 20:14


4 MG











Allergies:


Allergies:





Allergies








Coded Allergies Type Severity Reaction Last Updated Verified


 


  acetaminophen Allergy Unknown  20 Yes


 


  albuterol Adverse Reaction Intermediate  20 Yes


 


  ibuprofen Adverse Reaction Intermediate Nausea and Vomiting 20 Yes











Physical Exam:


PE:





Constitutional: Well developed, well nourished, no acute distress, non-toxic 

appearance. []unkept appearance


HENT: Normocephalic, atraumatic, 


Eyes:  EOMI, conjunctiva normal, no discharge. [] 


Neck: Normal range of motion,  supple, no stridor. [] 


Cardiovascular:Heart rate regular rhythm, no murmur []+AICD present-appears new 

given scar appearance


Lungs & Thorax:  Bilateral breath sounds clear to auscultation []


Abdomen: Bowel sounds normal, soft, no tenderness, no masses, no pulsatile 

masses. [] 


Skin: Warm, dry, no erythema, no rash. [] 


Back: No tenderness, 


Extremities: No tenderness, no cyanosis, no clubbing, ROM intact, no edema. [] 


Neurologic: Alert and oriented X 3, normal motor function, normal sensory 

function, no focal deficits noted. []


Psychologic: Affect normal, judgement normal, mood normal. []





Current Patient Data:


Labs:





                                Laboratory Tests








Test


 10/7/20


19:45 10/7/20


19:50


 


White Blood Count


 5.0 x10^3/uL


(4.0-11.0) 





 


Red Blood Count


 4.21 x10^6/uL


(4.30-5.70)  L 





 


Hemoglobin


 11.0 g/dL


(13.0-17.5)  L 





 


Hematocrit


 33.3 %


(39.0-53.0)  L 





 


Mean Corpuscular Volume


 79 fL ()


 





 


Mean Corpuscular Hemoglobin 26 pg (25-35)   


 


Mean Corpuscular Hemoglobin


Concent 33 g/dL


(31-37) 





 


Red Cell Distribution Width


 22.0 %


(11.5-14.5)  H 





 


Platelet Count


 220 x10^3/uL


(140-400) 





 


Neutrophils (%) (Auto) 54 % (31-73)   


 


Lymphocytes (%) (Auto) 25 % (24-48)   


 


Monocytes (%) (Auto) 16 % (0-9)  H 


 


Eosinophils (%) (Auto) 4 % (0-3)  H 


 


Basophils (%) (Auto) 1 % (0-3)   


 


Neutrophils # (Auto)


 2.7 x10^3/uL


(1.8-7.7) 





 


Lymphocytes # (Auto)


 1.3 x10^3/uL


(1.0-4.8) 





 


Monocytes # (Auto)


 0.8 x10^3/uL


(0.0-1.1) 





 


Eosinophils # (Auto)


 0.2 x10^3/uL


(0.0-0.7) 





 


Basophils # (Auto)


 0.1 x10^3/uL


(0.0-0.2) 





 


Platelet Estimate


 Adequate


(ADEQUATE) 





 


Hypochromasia Slight   


 


Anisocytosis Mod   


 


Sodium Level


 139 mmol/L


(136-145) 





 


Potassium Level


 5.2 mmol/L


(3.5-5.1)  H 





 


Chloride Level


 103 mmol/L


() 





 


Carbon Dioxide Level


 26 mmol/L


(21-32) 





 


Anion Gap 10 (6-14)   


 


Blood Urea Nitrogen


 9 mg/dL (8-26)


 





 


Creatinine


 1.1 mg/dL


(0.7-1.3) 





 


Estimated GFR


(Cockcroft-Gault) 68.3  


 





 


BUN/Creatinine Ratio 8 (6-20)   


 


Glucose Level


 88 mg/dL


(70-99) 





 


Calcium Level


 9.1 mg/dL


(8.5-10.1) 





 


Magnesium Level


 2.2 mg/dL


(1.8-2.4) 





 


Total Bilirubin


 0.2 mg/dL


(0.2-1.0) 





 


Aspartate Amino Transferase


(AST) 16 U/L (15-37)


 





 


Alanine Aminotransferase (ALT)


 20 U/L (16-63)


 





 


Alkaline Phosphatase


 98 U/L


() 





 


Troponin I Quantitative


 < 0.017 ng/mL


(0.000-0.055) 





 


NT-Pro-B-Type Natriuretic


Peptide 1017 pg/mL


(0-124)  H 





 


Total Protein


 7.5 g/dL


(6.4-8.2) 





 


Albumin


 3.7 g/dL


(3.4-5.0) 





 


Albumin/Globulin Ratio 1.0 (1.0-1.7)   


 


POC Troponin I


 


 0.01 ng/ml


(<0.08)





                                Laboratory Tests


10/7/20 19:45








                                Laboratory Tests


10/7/20 19:45








Vital Signs:





                                   Vital Signs








  Date Time  Temp Pulse Resp B/P (MAP) Pulse Ox O2 Delivery O2 Flow Rate FiO2


 


10/7/20 19:50 97.9 93 22 127/77 (94) 98 Room Air  





 97.9       











EKG:


EKG:


Sinus rhythm at 90 bpm, left axis deviation, , QTc 496, left bundle 

branch block pattern present, T wave inversion 2, 3, aVF, V5, V6, no ST 

elevations or ST depressions that are consistent with modified scarbossas 

criteria for STEMI equivalent, no change from prior EKG performed 2020





Radiology/Procedures:


Radiology/Procedures:


                                 IMAGING REPORT





                                     Signed





PATIENT: JOB RIOS   ACCOUNT: [id number]     MRN#: [id number]


: 1960           LOCATION: ER              AGE: 60


SEX: M                    EXAM DT: 10/07/20         ACCESSION#: [id number]


STATUS: REG ER            ORD. PHYSICIAN: JEROD BULLARD DO


REASON: cp


PROCEDURE: PORTABLE CHEST 1V





PORTABLE CHEST 1V


 


History: Chest pain


 


Comparison: 2020


 


Findings:


Single view of the chest is submitted. 


There is again triple lead left electronic cardiac device. Pericardial 


cardiac silhouette is again somewhat prominent although unchanged. There 


is no dependent pleural fluid, pneumothorax, or lobar infiltrate.


 


Impression: 


 


1.  No acute radiographic abnormality is identified.


 


Electronically signed by: La Nena Stewart MD (10/7/2020 8:21 PM) [facility]














DICTATED and SIGNED BY:     LA NENA STEWART MD


DATE:     10/07/20 2021





Course & Med Decision Making:


Course & Med Decision Making


Pertinent Labs and Imaging studies reviewed. (See chart for details)





Concern for moderate risk chest pain.  Labs with stable normocytic anemia.  I-

STAT troponin 0 0.01.   Initial troponin negative.  Labs within normal limits 

except for potassium of 5.2.  Chest x-ray with no acute process.  EMR was 

reviewed and patient was admitted and July of this year for similar symptoms. 

Will admit for serial troponins, cardiology consultation and further medical 

management.  Patient stable at time of admission and agrees with this plan.





I have spoken with the patient and/or caregivers.  I have explained the 

patient's condition, diagnosis and treatment plan based on the information 

available to me at this time.  I have answered the patient's and/or caregivers 

questions and answered any concerns.  The patient and/or caregivers have as good

 an understanding of the patient's diagnosis, condition and treatment plan as 

can be expected at this point.  The patient has been stabilized within the 

capability of the emergency department.  The patient will be transported for 

further care and management or will be moved to an observation or inpatient 

service.  I have communicated with the staff or medical practitioner taking over

 this patient's care.





Dragon Disclaimer:


Dragon Disclaimer:


This electronic medical record was generated, in whole or in part, using a voice

 recognition dictation system.





Departure


Departure


Impression:  


   Primary Impression:  


   Chest pain


   Additional Impression:  


   Anemia


Disposition:   ADMITTED AS INPATIENT


Admitting Physician:  WILFRED (Dr. Mendoza)


Referrals:  


NO PCP (PCP)











JEROD BULLARD DO                Oct 7, 2020 21:05

## 2020-10-08 VITALS — SYSTOLIC BLOOD PRESSURE: 114 MMHG | DIASTOLIC BLOOD PRESSURE: 68 MMHG

## 2020-10-08 VITALS — SYSTOLIC BLOOD PRESSURE: 109 MMHG | DIASTOLIC BLOOD PRESSURE: 58 MMHG

## 2020-10-08 VITALS — SYSTOLIC BLOOD PRESSURE: 104 MMHG | DIASTOLIC BLOOD PRESSURE: 64 MMHG

## 2020-10-08 RX ADMIN — ATORVASTATIN CALCIUM SCH MG: 40 TABLET, FILM COATED ORAL at 21:34

## 2020-10-08 RX ADMIN — MORPHINE SULFATE PRN MG: 4 INJECTION, SOLUTION INTRAMUSCULAR; INTRAVENOUS at 10:59

## 2020-10-08 RX ADMIN — MORPHINE SULFATE PRN MG: 4 INJECTION, SOLUTION INTRAMUSCULAR; INTRAVENOUS at 08:04

## 2020-10-08 RX ADMIN — MORPHINE SULFATE PRN MG: 4 INJECTION, SOLUTION INTRAMUSCULAR; INTRAVENOUS at 19:30

## 2020-10-08 RX ADMIN — IPRATROPIUM BROMIDE SCH MG: 0.5 SOLUTION RESPIRATORY (INHALATION) at 19:57

## 2020-10-08 RX ADMIN — MORPHINE SULFATE PRN MG: 4 INJECTION, SOLUTION INTRAMUSCULAR; INTRAVENOUS at 13:26

## 2020-10-08 RX ADMIN — CLOPIDOGREL BISULFATE SCH MG: 75 TABLET ORAL at 10:56

## 2020-10-08 RX ADMIN — IPRATROPIUM BROMIDE SCH MG: 0.5 SOLUTION RESPIRATORY (INHALATION) at 15:16

## 2020-10-08 RX ADMIN — PREGABALIN SCH MG: 75 CAPSULE ORAL at 21:34

## 2020-10-08 RX ADMIN — MORPHINE SULFATE PRN MG: 4 INJECTION, SOLUTION INTRAMUSCULAR; INTRAVENOUS at 21:35

## 2020-10-08 RX ADMIN — ASPIRIN 81 MG SCH MG: 81 TABLET ORAL at 10:56

## 2020-10-08 RX ADMIN — MORPHINE SULFATE PRN MG: 4 INJECTION, SOLUTION INTRAMUSCULAR; INTRAVENOUS at 17:28

## 2020-10-08 RX ADMIN — METOPROLOL SUCCINATE SCH MG: 25 TABLET, EXTENDED RELEASE ORAL at 10:58

## 2020-10-08 RX ADMIN — MORPHINE SULFATE PRN MG: 4 INJECTION, SOLUTION INTRAMUSCULAR; INTRAVENOUS at 15:36

## 2020-10-08 RX ADMIN — MORPHINE SULFATE PRN MG: 4 INJECTION, SOLUTION INTRAMUSCULAR; INTRAVENOUS at 23:40

## 2020-10-08 NOTE — PDOC1
History and Physical


Date of Admission


Date of Admission


DATE: 10/8/20 


TIME: 09:51





Identification/Chief Complaint


Chief Complaint


Chest pain





Source


Source:  Patient





History of Present Illness


History of Present Illness


Patient is 60-year-old male with past medical history CABG, CHF, with recent 

AICD placement, who presents with complaints of left-sided chest pain since 

yesterday.  He reports sharp chest pain, 7/10, radiates to his left neck.  He 

reports some associated shortness of breath and diaphoresis.  He took his home 

nitroglycerin and aspirin without any improvement in his pain.  He denies any 

aggravating symptoms, but states his pain has improved with medications that he 

received in the ER.  He also notes a history of methamphetamine abuse, but 

states his last usage was 5 weeks ago.  He denies any associated nausea or 

vomiting.  He states he did not feel his AICD fire.








Troponin <0.017 x 2, K 5.2


Chest x-ray on admission shows no acute process





Past Medical History


Cardiovascular:  CAD, CHF, HTN, Hyperlipidemia, Other


Pulmonary:  Asthma


CENTRAL NERVOUS SYSTEM:  CVA


GI:  GERD


Heme/Onc:  No pertinent hx


Hepatobiliary:  No pertinent hx


Psych:  Anxiety


Musculoskeletal:  Osteoarthritis


Rheumatologic:  No pertinent hx


Infectious disease:  No pertinent hx


Renal/:  No pertinent hx


Endocrine:  No pertinent hx





Past Surgical History


Past Surgical History:  Pacemaker, Other





Family History


Family History:  Hypertension





Social History


Smoke:  1 pack per day


ALCOHOL:  none


Drugs:  Crystal meth, Other





Current Problem List


Problem List


Problems


Medical Problems:


(1) Anemia


Status: Acute  





(2) Chest pain


Status: Acute  











Current Medications


Current Medications





Current Medications


Morphine Sulfate (Morphine Sulfate) 4 mg 1X  ONCE IV  Last administered on 

10/7/20at 20:14;  Start 10/7/20 at 20:00;  Stop 10/7/20 at 20:01;  Status DC


Ketorolac Tromethamine (Toradol 15mg Vial) 15 mg 1X  ONCE IVP  Last administered

on 10/8/20at 01:46;  Start 10/8/20 at 02:00;  Stop 10/8/20 at 02:01;  Status DC


Morphine Sulfate (Morphine Sulfate) 4 mg PRN Q2HR  PRN IV PAIN Last administered

on 10/8/20at 08:04;  Start 10/8/20 at 08:00


Nitroglycerin (Nitrostat) 0.4 mg PRN Q5MIN  PRN SL CHEST PAIN;  Start 10/8/20 at

08:15





Active Scripts


Active


Crestor (Rosuvastatin Calcium) 40 Mg Tablet 1 Tab PO DAILY


Metoprolol Tartrate 25 Mg Tablet 0.5 Tab PO BID 10 Days


Aspirin 81 Mg Tab.chew 1 Tab PO DAILY


Clopidogrel (Clopidogrel Bisulfate) 75 Mg Tablet 1 Tab PO DAILY


Tramadol Hcl 50 Mg Tablet 100 Mg PO PRN TID PRN


Reported


Crestor (Rosuvastatin Calcium) 40 Mg Tablet 40 Mg PO HS


Metoprolol Succinate ( Xl ) (Metoprolol Succinate) 25 Mg Tab.er.24h 12.5 Mg PO 

DAILY


Spiriva (Tiotropium [place]) 18 Mcg Cap.w.dev 1 Cap IH DAILY


NITROGLYCERIN SubLingual (Nitroglycerin) 0.4 Mg Tab.subl 0.4 Mg SL PRN Q5MIN PRN


Lyrica (Pregabalin) 300 Mg Capsule 1 Cap PO BID


Plavix (Clopidogrel Bisulfate) 75 Mg Tablet 75 Mg PO DAILY


Aspir 81 (Aspirin) 81 Mg Tablet.dr 1 Tab PO DAILY





Allergies


Allergies:  


Coded Allergies:  


     acetaminophen (Verified  Allergy, Intermediate, 10/8/20)


     albuterol (Verified  Adverse Reaction, Intermediate, 1/22/20)


   


   Patient states "it speeds my heart up too much"


     ibuprofen (Verified  Adverse Reaction, Intermediate, Nausea and Vomiting, 

1/22/20)





ROS


Review of System


GENERAL:  No history of weight change, weakness or fevers.


SKIN:  No bruising, hair changes or rashes.


EYES:  No blurred, double or loss of vision.


NOSE AND THROAT:  No history of nosebleeds, hoarseness or sore throat.


HEART: Chest pain. Denies palpitations.


LUNGS: Shortness of breath. Denies cough, hemoptysis, wheezing.


GASTROINTESTINAL: Denies nausea, vomiting, abdominal pain.


GENITOURINARY: Denies dysuria, frequency, urgency, hematuria.


NEUROLOGIC:  Denies history of numbness, tingling, tremor or weakness.


PSYCHIATRIC: Denies anxiety, denies depression.


ENDOCRINE:  No history of heat or cold intolerance, polyuria or polydipsia.


EXTREMITIES:  Denies muscle weakness, joint pain, pain on walking or stiffness.





Physical Exam


Physical Exam


General:  Alert, Oriented X3, Cooperative, No acute distress


HEENT:  PERRLA, EOMI


Lungs:  Clear to auscultation, Normal air movement


Heart:  RRR, no murmurs


Cardiovascular:  S1, S2


Abdomen:  Normal bowel sounds, Soft, No tenderness


Extremities:  No clubbing, No cyanosis


Skin:  No rashes, No significant lesion


Neuro:  Normal speech, Normal tone, Sensation intact


Psych/Mental Status:  Mental status NL, Mood NL





Vitals


Vitals





Vital Signs








  Date Time  Temp Pulse Resp B/P (MAP) Pulse Ox O2 Delivery O2 Flow Rate FiO2


 


10/8/20 08:04   18   Room Air  


 


10/8/20 06:43  84  104/63 (77) 96   


 


10/7/20 19:50 97.9       





 97.9       











Labs


Labs





Laboratory Tests








Test


 10/7/20


19:45 10/7/20


19:50 10/8/20


02:58


 


White Blood Count


 5.0 x10^3/uL


(4.0-11.0) 


 





 


Red Blood Count


 4.21 x10^6/uL


(4.30-5.70) 


 





 


Hemoglobin


 11.0 g/dL


(13.0-17.5) 


 





 


Hematocrit


 33.3 %


(39.0-53.0) 


 





 


Mean Corpuscular Volume 79 fL ()   


 


Mean Corpuscular Hemoglobin 26 pg (25-35)   


 


Mean Corpuscular Hemoglobin


Concent 33 g/dL


(31-37) 


 





 


Red Cell Distribution Width


 22.0 %


(11.5-14.5) 


 





 


Platelet Count


 220 x10^3/uL


(140-400) 


 





 


Neutrophils (%) (Auto) 54 % (31-73)   


 


Lymphocytes (%) (Auto) 25 % (24-48)   


 


Monocytes (%) (Auto) 16 % (0-9)   


 


Eosinophils (%) (Auto) 4 % (0-3)   


 


Basophils (%) (Auto) 1 % (0-3)   


 


Neutrophils # (Auto)


 2.7 x10^3/uL


(1.8-7.7) 


 





 


Lymphocytes # (Auto)


 1.3 x10^3/uL


(1.0-4.8) 


 





 


Monocytes # (Auto)


 0.8 x10^3/uL


(0.0-1.1) 


 





 


Eosinophils # (Auto)


 0.2 x10^3/uL


(0.0-0.7) 


 





 


Basophils # (Auto)


 0.1 x10^3/uL


(0.0-0.2) 


 





 


Platelet Estimate


 Adequate


(ADEQUATE) 


 





 


Hypochromasia Slight   


 


Anisocytosis Mod   


 


Sodium Level


 139 mmol/L


(136-145) 


 





 


Potassium Level


 5.2 mmol/L


(3.5-5.1) 


 





 


Chloride Level


 103 mmol/L


() 


 





 


Carbon Dioxide Level


 26 mmol/L


(21-32) 


 





 


Anion Gap 10 (6-14)   


 


Blood Urea Nitrogen 9 mg/dL (8-26)   


 


Creatinine


 1.1 mg/dL


(0.7-1.3) 


 





 


Estimated GFR


(Cockcroft-Gault) 68.3 


 


 





 


BUN/Creatinine Ratio 8 (6-20)   


 


Glucose Level


 88 mg/dL


(70-99) 


 





 


Calcium Level


 9.1 mg/dL


(8.5-10.1) 


 





 


Magnesium Level


 2.2 mg/dL


(1.8-2.4) 


 





 


Total Bilirubin


 0.2 mg/dL


(0.2-1.0) 


 





 


Aspartate Amino Transf


(AST/SGOT) 16 U/L (15-37) 


 


 





 


Alanine Aminotransferase


(ALT/SGPT) 20 U/L (16-63) 


 


 





 


Alkaline Phosphatase


 98 U/L


() 


 





 


Troponin I Quantitative


 < 0.017 ng/mL


(0.000-0.055) 


 < 0.017 ng/mL


(0.000-0.055)


 


NT-Pro-B-Type Natriuretic


Peptide 1017 pg/mL


(0-124) 


 





 


Total Protein


 7.5 g/dL


(6.4-8.2) 


 





 


Albumin


 3.7 g/dL


(3.4-5.0) 


 





 


Albumin/Globulin Ratio 1.0 (1.0-1.7)   


 


Bedside Troponin I


 


 0.01 ng/ml


(<0.08) 











Laboratory Tests








Test


 10/7/20


19:45 10/7/20


19:50 10/8/20


02:58


 


White Blood Count


 5.0 x10^3/uL


(4.0-11.0) 


 





 


Red Blood Count


 4.21 x10^6/uL


(4.30-5.70) 


 





 


Hemoglobin


 11.0 g/dL


(13.0-17.5) 


 





 


Hematocrit


 33.3 %


(39.0-53.0) 


 





 


Mean Corpuscular Volume 79 fL ()   


 


Mean Corpuscular Hemoglobin 26 pg (25-35)   


 


Mean Corpuscular Hemoglobin


Concent 33 g/dL


(31-37) 


 





 


Red Cell Distribution Width


 22.0 %


(11.5-14.5) 


 





 


Platelet Count


 220 x10^3/uL


(140-400) 


 





 


Neutrophils (%) (Auto) 54 % (31-73)   


 


Lymphocytes (%) (Auto) 25 % (24-48)   


 


Monocytes (%) (Auto) 16 % (0-9)   


 


Eosinophils (%) (Auto) 4 % (0-3)   


 


Basophils (%) (Auto) 1 % (0-3)   


 


Neutrophils # (Auto)


 2.7 x10^3/uL


(1.8-7.7) 


 





 


Lymphocytes # (Auto)


 1.3 x10^3/uL


(1.0-4.8) 


 





 


Monocytes # (Auto)


 0.8 x10^3/uL


(0.0-1.1) 


 





 


Eosinophils # (Auto)


 0.2 x10^3/uL


(0.0-0.7) 


 





 


Basophils # (Auto)


 0.1 x10^3/uL


(0.0-0.2) 


 





 


Platelet Estimate


 Adequate


(ADEQUATE) 


 





 


Hypochromasia Slight   


 


Anisocytosis Mod   


 


Sodium Level


 139 mmol/L


(136-145) 


 





 


Potassium Level


 5.2 mmol/L


(3.5-5.1) 


 





 


Chloride Level


 103 mmol/L


() 


 





 


Carbon Dioxide Level


 26 mmol/L


(21-32) 


 





 


Anion Gap 10 (6-14)   


 


Blood Urea Nitrogen 9 mg/dL (8-26)   


 


Creatinine


 1.1 mg/dL


(0.7-1.3) 


 





 


Estimated GFR


(Cockcroft-Gault) 68.3 


 


 





 


BUN/Creatinine Ratio 8 (6-20)   


 


Glucose Level


 88 mg/dL


(70-99) 


 





 


Calcium Level


 9.1 mg/dL


(8.5-10.1) 


 





 


Magnesium Level


 2.2 mg/dL


(1.8-2.4) 


 





 


Total Bilirubin


 0.2 mg/dL


(0.2-1.0) 


 





 


Aspartate Amino Transf


(AST/SGOT) 16 U/L (15-37) 


 


 





 


Alanine Aminotransferase


(ALT/SGPT) 20 U/L (16-63) 


 


 





 


Alkaline Phosphatase


 98 U/L


() 


 





 


Troponin I Quantitative


 < 0.017 ng/mL


(0.000-0.055) 


 < 0.017 ng/mL


(0.000-0.055)


 


NT-Pro-B-Type Natriuretic


Peptide 1017 pg/mL


(0-124) 


 





 


Total Protein


 7.5 g/dL


(6.4-8.2) 


 





 


Albumin


 3.7 g/dL


(3.4-5.0) 


 





 


Albumin/Globulin Ratio 1.0 (1.0-1.7)   


 


Bedside Troponin I


 


 0.01 ng/ml


(<0.08) 














Images


Images


History: Chest pain


 


Comparison: September 1, 2020


 


Findings:


Single view of the chest is submitted. 


There is again triple lead left electronic cardiac device. Pericardial 


cardiac silhouette is again somewhat prominent although unchanged. There 


is no dependent pleural fluid, pneumothorax, or lobar infiltrate.


 


Impression: 


 


1.  No acute radiographic abnormality is identified.





VTE Prophylaxis Ordered


VTE Prophylaxis Devices:  No


VTE Pharmacological Prophylaxi:  Yes





Assessment/Plan


Assessment/Plan


Unstable angina


Hyperkalemia


Elevated BNP





Plan:


Troponins undetectable


Consulted cardiology


Lasix 20 mg IV x1


Resume home medications


FEN - Cardiac diet


PPX  - Heparin


FULL CODE


Dispo - inpatient for above





Justifications for Admission


Other Justification














SG OLIVO MD             Oct 8, 2020 09:51

## 2020-10-08 NOTE — PDOC2
BARBARA CHARLES APRN 10/8/20 1018:


CARDIAC CONSULT


DATE OF CONSULT


Date of Consult


DATE: 10/8/20 


TIME: 10:11





REASON FOR CONSULT


Reason for Consult:


Chest pain





REFERRING PHYSICIAN


Referring Physician:


Dr. Ribeiro





SOURCE


Source:  Chart review, Patient





HISTORY OF PRESENT ILLNESS


HISTORY OF PRESENT ILLNESS


This is a 59 yo male, with a history of CAD and known , who presented 

secondary to chest pain. Reports pain began yesterday. Located in his left chest

and radiated to his neck. Describes as aching. Pain has been constant with no 

specific worsening or relieving factors. No associated dizziness, diaphoresis, 

or SOA. Does not follow with routine cardiologist. Has been at multiple [facility]

including [facility] and [facility]. Was seen by our service 

previously for similar chest pain. Discussed further ischemic workup with stress

test versus LHC. Patient would like to proceed with LHC.





PAST MEDICAL HISTORY


Past Medical History


Cardiovascular:  CAD, CHF (ICM), HTN, Hyperlipidemia, Other (VT)


Pulmonary:  Asthma


CENTRAL NERVOUS SYSTEM:  CVA


GI:  GERD


Heme/Onc:  No pertinent hx


Hepatobiliary:  No pertinent hx


Psych:  Anxiety


Musculoskeletal:  Osteoarthritis


Rheumatologic:  No pertinent hx


Infectious disease:  No pertinent hx


ENT:  No pertinent hx


Renal/:  No pertinent hx


Endocrine:  No pertinent hx


Dermatology:  No pertinent hx





PAST SURGICAL HISTORY


Past Surgical History


Pacemaker (AICD), Other (multiple PCI/stents.





FAMILY HISTORY


Family History:  Hypertension





SOCIAL HISTORY


Social History


Smoke:  <1 pack per day


ALCOHOL:  none


Drugs:  Other (hx of meth use. 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


Lives:  with Family





CURRENT MEDICATIONS


CURRENT MEDICATIONS





Current Medications








 Medications


  (Trade)  Dose


 Ordered  Sig/Gwen


 Route


 PRN Reason  Start Time


 Stop Time Status Last Admin


Dose Admin


 


 Morphine Sulfate


  (Morphine


 Sulfate)  4 mg  1X  ONCE


 IV


   10/7/20 20:00


 10/7/20 20:01 DC 10/7/20 20:14





 


 Ketorolac


 Tromethamine


  (Toradol 15mg


 Vial)  15 mg  1X  ONCE


 IVP


   10/8/20 02:00


 10/8/20 02:01 DC 10/8/20 01:46





 


 Morphine Sulfate


  (Morphine


 Sulfate)  4 mg  PRN Q2HR  PRN


 IV


 PAIN  10/8/20 08:00


    10/8/20 08:04














ALLERGIES


ALLERGIES:  


Coded Allergies:  


     acetaminophen (Verified  Allergy, Intermediate, 10/8/20)


     albuterol (Verified  Adverse Reaction, Intermediate, 1/22/20)


   


   Patient states "it speeds my heart up too much"


     ibuprofen (Verified  Adverse Reaction, Intermediate, Nausea and Vomiting, 

1/22/20)





ROS


Review of System


14 point ROS conducted with pertinent positives noted above in HPI





PHYSICAL EXAM


PHYSICAL EXAM


General:  Alert, Oriented X3, Cooperative, No acute distress


HEENT:  Atraumatic, Mucous membr. moist/pink


Lungs:  Other (diminished bases)


Heart:  Regular rate (SR with LBBB), Normal S1, Normal S2


Abdomen:  Soft, No tenderness


Extremities:  No cyanosis, No edema


Skin:  No breakdown, No significant lesion


Neuro:  Normal speech, Sensation intact


Psych/Mental Status:  Mental status NL, anxious, figiting 


MUSCULOSKELETAL:  Osteoarthritic changes both hands





VITALS/I&O


VITALS/I&O:





                                   Vital Signs








  Date Time  Temp Pulse Resp B/P (MAP) Pulse Ox O2 Delivery O2 Flow Rate FiO2


 


10/8/20 08:04   18   Room Air  


 


10/8/20 06:43  84  104/63 (77) 96   


 


10/7/20 19:50 97.9       





 97.9       











LABS


Lab:





                                Laboratory Tests








Test


 10/7/20


19:45 10/7/20


19:50 10/8/20


02:58


 


White Blood Count


 5.0 x10^3/uL


(4.0-11.0) 


 





 


Red Blood Count


 4.21 x10^6/uL


(4.30-5.70)  L 


 





 


Hemoglobin


 11.0 g/dL


(13.0-17.5)  L 


 





 


Hematocrit


 33.3 %


(39.0-53.0)  L 


 





 


Mean Corpuscular Volume


 79 fL ()


 


 





 


Mean Corpuscular Hemoglobin 26 pg (25-35)    


 


Mean Corpuscular Hemoglobin


Concent 33 g/dL


(31-37) 


 





 


Red Cell Distribution Width


 22.0 %


(11.5-14.5)  H 


 





 


Platelet Count


 220 x10^3/uL


(140-400) 


 





 


Neutrophils (%) (Auto) 54 % (31-73)    


 


Lymphocytes (%) (Auto) 25 % (24-48)    


 


Monocytes (%) (Auto) 16 % (0-9)  H  


 


Eosinophils (%) (Auto) 4 % (0-3)  H  


 


Basophils (%) (Auto) 1 % (0-3)    


 


Neutrophils # (Auto)


 2.7 x10^3/uL


(1.8-7.7) 


 





 


Lymphocytes # (Auto)


 1.3 x10^3/uL


(1.0-4.8) 


 





 


Monocytes # (Auto)


 0.8 x10^3/uL


(0.0-1.1) 


 





 


Eosinophils # (Auto)


 0.2 x10^3/uL


(0.0-0.7) 


 





 


Basophils # (Auto)


 0.1 x10^3/uL


(0.0-0.2) 


 





 


Platelet Estimate


 Adequate


(ADEQUATE) 


 





 


Hypochromasia Slight    


 


Anisocytosis Mod    


 


Sodium Level


 139 mmol/L


(136-145) 


 





 


Potassium Level


 5.2 mmol/L


(3.5-5.1)  H 


 





 


Chloride Level


 103 mmol/L


() 


 





 


Carbon Dioxide Level


 26 mmol/L


(21-32) 


 





 


Anion Gap 10 (6-14)    


 


Blood Urea Nitrogen


 9 mg/dL (8-26)


 


 





 


Creatinine


 1.1 mg/dL


(0.7-1.3) 


 





 


Estimated GFR


(Cockcroft-Gault) 68.3  


 


 





 


BUN/Creatinine Ratio 8 (6-20)    


 


Glucose Level


 88 mg/dL


(70-99) 


 





 


Calcium Level


 9.1 mg/dL


(8.5-10.1) 


 





 


Magnesium Level


 2.2 mg/dL


(1.8-2.4) 


 





 


Total Bilirubin


 0.2 mg/dL


(0.2-1.0) 


 





 


Aspartate Amino Transferase


(AST) 16 U/L (15-37)


 


 





 


Alanine Aminotransferase (ALT)


 20 U/L (16-63)


 


 





 


Alkaline Phosphatase


 98 U/L


() 


 





 


Troponin I Quantitative


 < 0.017 ng/mL


(0.000-0.055) 


 < 0.017 ng/mL


(0.000-0.055)


 


NT-Pro-B-Type Natriuretic


Peptide 1017 pg/mL


(0-124)  H 


 





 


Total Protein


 7.5 g/dL


(6.4-8.2) 


 





 


Albumin


 3.7 g/dL


(3.4-5.0) 


 





 


Albumin/Globulin Ratio 1.0 (1.0-1.7)    


 


POC Troponin I


 


 0.01 ng/ml


(<0.08) 








                                Laboratory Tests


10/7/20 19:45








                                Laboratory Tests


10/7/20 19:45











ECHOCARDIOGRAM


ECHOCARDIOGRAM


<Conclusion>


Left ventricle systolic function is moderately impaired. EF 30-35%.


There is severe global hypokinesis of the left ventricle.


Tissue Doppler imaging reveals moderate left ventricular diastolic dysfunction.


There is a pacemaker lead in the right ventricle.


Doppler and Color-flow revealed moderate mitral regurgitation.


Doppler and Color Flow revealed mild tricuspid regurgitation. There is moderate 

pulmonary hypertension. The PA pressure was estimated at 44 mmHg.





DATE:     07/10/20 0907





STRESS TEST


STRESS TEST


Conclusion


1. No evidence of stress induced EKG changes. Rare PVCs noted.


2. Large fixed inferior/lateral defect as noted above.


3. Moderate LV dysfunction. EF 40%.


4. Moderate risk study based on EF and size of fixed defect suggestive of prior 

infarct.





DATE:     06/15/16 1219








[facility] 8/16/2018





Conclusion: Pharmacologic stress ECG is nondiagnostic for ischemia. Frequent 

ventricular bigeminy and trigeminy throughout the study. 





SUMMARY/OPINION: This study is definitely abnormal. There is moderate to large 

area of primarily fixed perfusion abnormality involving basal mid inferolateral 

segments of severe intensity. These areas do not appear to be viable. There are 

no significant reversible perfusion abnormalities. Left ventricle is dilated wi

th severely depressed left ventricular systolic function. There are no other 

high risk prognostic indicators present. The ECG portion of the study is 

nondiagnostic for ischemia. Frequent ventricular bigeminy and trigeminy 

throughout the study. 


Study was compared with the prior study dated 12/11/2017, pharmacological stress

thallium study using DSPECT camera. Previous study was abnormal and demon

strated lateral fixed perfusion abnormality. Comparing the 2 studies there is no

significant interval change in the perfusion pattern. Left ventricular ejection 

fraction is decreased from 42 to 24%. Left ventricle end-diastolic volume was 

165 mL and pulmonary to myocardial count ratio was 0.33.





HEART CATH


HEART CATH


[facility] Jan 2018 showed showed patent mid LAD stent, patent 1st 

diagonal stent, total chronic occlusion of first obtuse marginal with territory 

infarct but no indication for PCI, patent stent in left AV groove artery, patent

stent in LPDA, non-dominant 100 % stenosis of proximal RCA. )





ASSESSMENT/PLAN


ASSESSMENT/PLAN


1.  Chest pain, mixed features; AMI ruled out. 


2.  Chronic systolic CHF; clinically compensated 


3.  ICM; s/p AICD ([employer])


4.  CAD; s/p PCI/multiple stents in the past. see cath report above. Does not 

follow with cardiologist


5.  Hx of VT: not on antiarrhythmic


6.  HTN: controlled


7.  HLP: not on goal


8.  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 


9. Tobaccoism with likely COPD








Recommendations





Restart home plavix and ASA and secondary prevention measures. 


Given recurrent CP, discussed further ischemic with stress test versus 

definitive evaluation with left heart cath. Patient requesting LHC. R/b/a 

discussed and he is agreeable to proceed. 


Smoking cessation


Discussed important of compliance with medical therapy/followup and abstinence 

of recreational drug use


NPO p MN. Will proceed with in am.





NOVA ESPINOSA MD 10/8/20 2050:


CARDIAC CONSULT


ASSESSMENT/PLAN


ASSESSMENT/PLAN


Patient seen and examined.  Agree with NP's assessment and plan,


Patient with significant CAD history presented with CP with mixed features


MI ruled out


Chr systolic HF compensated


s/p AICD clinically stable but no recent interrogation - will get this done 

while here


Due to continuing CP, we will proceed with cath and possible PCI 


Risks and benefits explained and he is agreeable


Importance of compliance with meds, followups and abstinence from substance 

abuse reemphasized


Thank you for your consultation











BARBARA CHARLES            Oct 8, 2020 10:18


NOVA ESPINOSA MD            Oct 8, 2020 20:50

## 2020-10-09 VITALS — SYSTOLIC BLOOD PRESSURE: 104 MMHG | DIASTOLIC BLOOD PRESSURE: 57 MMHG

## 2020-10-09 VITALS — SYSTOLIC BLOOD PRESSURE: 96 MMHG | DIASTOLIC BLOOD PRESSURE: 53 MMHG

## 2020-10-09 VITALS — SYSTOLIC BLOOD PRESSURE: 88 MMHG | DIASTOLIC BLOOD PRESSURE: 56 MMHG

## 2020-10-09 VITALS — SYSTOLIC BLOOD PRESSURE: 97 MMHG | DIASTOLIC BLOOD PRESSURE: 53 MMHG

## 2020-10-09 VITALS — DIASTOLIC BLOOD PRESSURE: 63 MMHG | SYSTOLIC BLOOD PRESSURE: 102 MMHG

## 2020-10-09 VITALS — SYSTOLIC BLOOD PRESSURE: 86 MMHG | DIASTOLIC BLOOD PRESSURE: 56 MMHG

## 2020-10-09 VITALS — SYSTOLIC BLOOD PRESSURE: 96 MMHG | DIASTOLIC BLOOD PRESSURE: 63 MMHG

## 2020-10-09 VITALS — DIASTOLIC BLOOD PRESSURE: 54 MMHG | SYSTOLIC BLOOD PRESSURE: 96 MMHG

## 2020-10-09 VITALS — SYSTOLIC BLOOD PRESSURE: 103 MMHG | DIASTOLIC BLOOD PRESSURE: 58 MMHG

## 2020-10-09 VITALS — DIASTOLIC BLOOD PRESSURE: 65 MMHG | SYSTOLIC BLOOD PRESSURE: 105 MMHG

## 2020-10-09 VITALS — DIASTOLIC BLOOD PRESSURE: 76 MMHG | SYSTOLIC BLOOD PRESSURE: 112 MMHG

## 2020-10-09 VITALS — DIASTOLIC BLOOD PRESSURE: 56 MMHG | SYSTOLIC BLOOD PRESSURE: 94 MMHG

## 2020-10-09 PROCEDURE — B2151ZZ FLUOROSCOPY OF LEFT HEART USING LOW OSMOLAR CONTRAST: ICD-10-PCS | Performed by: NURSE PRACTITIONER

## 2020-10-09 PROCEDURE — B2111ZZ FLUOROSCOPY OF MULTIPLE CORONARY ARTERIES USING LOW OSMOLAR CONTRAST: ICD-10-PCS | Performed by: NURSE PRACTITIONER

## 2020-10-09 PROCEDURE — 4A023N7 MEASUREMENT OF CARDIAC SAMPLING AND PRESSURE, LEFT HEART, PERCUTANEOUS APPROACH: ICD-10-PCS | Performed by: NURSE PRACTITIONER

## 2020-10-09 RX ADMIN — PREGABALIN SCH MG: 75 CAPSULE ORAL at 09:00

## 2020-10-09 RX ADMIN — ASPIRIN 81 MG SCH MG: 81 TABLET ORAL at 08:23

## 2020-10-09 RX ADMIN — MORPHINE SULFATE PRN MG: 2 INJECTION, SOLUTION INTRAMUSCULAR; INTRAVENOUS at 21:24

## 2020-10-09 RX ADMIN — MORPHINE SULFATE PRN MG: 2 INJECTION, SOLUTION INTRAMUSCULAR; INTRAVENOUS at 17:22

## 2020-10-09 RX ADMIN — IPRATROPIUM BROMIDE SCH MG: 0.5 SOLUTION RESPIRATORY (INHALATION) at 20:00

## 2020-10-09 RX ADMIN — MORPHINE SULFATE PRN MG: 4 INJECTION, SOLUTION INTRAMUSCULAR; INTRAVENOUS at 06:03

## 2020-10-09 RX ADMIN — IPRATROPIUM BROMIDE SCH MG: 0.5 SOLUTION RESPIRATORY (INHALATION) at 16:00

## 2020-10-09 RX ADMIN — IPRATROPIUM BROMIDE SCH MG: 0.5 SOLUTION RESPIRATORY (INHALATION) at 08:00

## 2020-10-09 RX ADMIN — ATORVASTATIN CALCIUM SCH MG: 40 TABLET, FILM COATED ORAL at 21:23

## 2020-10-09 RX ADMIN — MORPHINE SULFATE PRN MG: 4 INJECTION, SOLUTION INTRAMUSCULAR; INTRAVENOUS at 08:24

## 2020-10-09 RX ADMIN — MORPHINE SULFATE PRN MG: 4 INJECTION, SOLUTION INTRAMUSCULAR; INTRAVENOUS at 02:58

## 2020-10-09 RX ADMIN — METOPROLOL SUCCINATE SCH MG: 25 TABLET, EXTENDED RELEASE ORAL at 08:25

## 2020-10-09 RX ADMIN — MORPHINE SULFATE PRN MG: 4 INJECTION, SOLUTION INTRAMUSCULAR; INTRAVENOUS at 10:36

## 2020-10-09 RX ADMIN — CLOPIDOGREL BISULFATE SCH MG: 75 TABLET ORAL at 08:23

## 2020-10-09 RX ADMIN — SODIUM CHLORIDE SCH MLS/HR: 450 INJECTION, SOLUTION INTRAVENOUS at 14:37

## 2020-10-09 RX ADMIN — IPRATROPIUM BROMIDE SCH MG: 0.5 SOLUTION RESPIRATORY (INHALATION) at 12:00

## 2020-10-09 RX ADMIN — PREGABALIN SCH MG: 75 CAPSULE ORAL at 21:23

## 2020-10-09 RX ADMIN — IPRATROPIUM BROMIDE SCH MG: 0.5 SOLUTION RESPIRATORY (INHALATION) at 21:00

## 2020-10-09 NOTE — CARD
MR#: [id number]

Account#: [id number]

Accession#: [id number]

Date of Study: 10/09/2020

Ordering Physician: BARBARA CHARLES, 

Referring Physician: BARBARA CHARLES, 

Tech: SHAYLEE GEORGE RTR





--------------- APPROVED REPORT --------------





Technologist: SHAYLEE GEORGE RTR

Nurse: PHILLIP CATES RN



Procedure(s) performed: Left heart catheterization, selective coronary angiography and left ventricul
ography



MODERATE SEDATION TIME: 30 MINUTES

FLUORO TIME: 3.3 MIN

DOSE: 38.9 GYCM2

CONTRAST: 112CC OMNI 300



INDICATION

The indication(s) include : unstable angina .



[facility] Clinical Frailty Scale

[facility] Clinical Frailty Scale: Mildly Frail



Heart Failure

Heart Failure: Yes

If Yes, Newly Diagnosed: No

If Yes, HF Type: Systolic     

If Yes, NYHA Class: Class II     



PROCEDURE NARRATIVE

After explaining the risks, benefits and alternative options, informed consent was obtained from tulio
ent.  Patient was brought to the cardiac Cath Lab and his left groin was prepped and draped in the us
ual fashion.  20 cc of 2% lidocaine was infiltrated into the skin and subcutaneous tissues for local 
anesthesia.  Arterial access was obtained in the left common femoral artery and a 6 Libyan sheath was
 inserted.  6 Libyan JL4 6 Libyan JR4 catheters were used to perform selective angiography of the lef
t and right coronary arteries.  6 Libyan pigtail catheter was used to perform left ventriculography. 
 Patient tolerated the procedure well.  Hemostasis was achieved using Perclose suture closure device.
  There were no immediate complications.



FINDINGS

1.  Hemodynamics: Left ventricular end-diastolic pressure 18 mmHg.  No pullback gradient across aorti
c valve.

2.  Left ventriculography: Severe left ventricular systolic dysfunction with ejection fraction estima
dixon at 20 to 25%.  2+ mitral regurgitation seen.

3.  Coronary angiography:

a.  The left main coronary artery arose from the left sinus of Valsalva, gave rise to the left anteri
or descending and left circumflex arteries and did not show any significant stenosis.

b.  The left anterior descending artery showed patent long stented mid and distal segments with a dustin
y distal segment showing 40% in-stent restenosis.  Stent in the diagonal branch was patent.

c.  The left circumflex artery was a large and dominant vessel.  The first obtuse marginal branch tiffanie
wed 100% chronic total occlusion within the stent in the proximal segment, described in prior cardiac
 catheterization.  The stent in the left posterior descending artery was patent.

d.  The right coronary artery was a small and nondominant vessel arising from the right sinus of Vals
woodruff that showed 100% chronic total occlusion in the proximal segment, described in prior cardiac cat
heterization.



Conclusion

1.  Patent previously placed stents in the left anterior descending artery, diagonal branch and left 
posterior descending artery.  The obtuse marginal branch and nondominant right coronary artery showed
 100% chronic total occlusions, described in prior cardiac catheterization.

2.  Severe left ventricular systolic dysfunction with ejection fraction estimated at 20 to 25% with 2
+ mitral regurgitation.



Recommendations

Optimization of medical therapy for coronary disease and ischemic cardiomyopathy.



Signed by : Yuan Vance, 

Electronically Approved : 10/09/2020 14:27:37

## 2020-10-09 NOTE — NUR
SS following for discharge planning. SS reviewed pt chart and discussed with pt RN. Pt is 
from home and is currently on room air. Pt having heart cath at 1300 today. Pt has history 
of Methamphetamine use. SS will continue to follow for discharge planning.

## 2020-10-09 NOTE — NUR
Pt IV fluids not running at shift change. Pt is refusing iv fluids. I did education with him 
that it was ordered by a physician and that iv fluids are used after a cath to help clear 
contrast used in the cath which can be damaging to kidneys. Pt verbalized understanding. He 
states "I know what I need and don't need, I've had 12 stents". Pt encouraged to follow his 
plan of care directed by the physician.

## 2020-10-09 NOTE — PDOC
MODERATE SEDATION ASSESSMENT


RISKS/ALTERNATIVES


Risks/Alternatives


Risks and alternatives of this type of sedation and procedure discussed with:


RISK/ALTERNATIVES:  Patient





H & P ON CHART


H & P


H & P on chart and reviewed for co-morbid conditions and appropriate labs.


H&P ON CHART:  Yes





PREGNANCY STATUS


PREG STATUS ASSESSED:  N/A





MEDS/ALLERGIES REVIEWED


Meds/Allergies Reviewed


Medications and Allergies including time and route of recently administered 

narcotics and sedatives.


MEDS/ALLERGIES REVIEWED:  Yes





ASA RATING


ASA RATING:  III





AIRWAY ASSESSMENT


Airway Assessment


Airway patency, oral function limitations, presence  of caps, crowns, dentures, 

partials, and ability to extend neck assessed.


AIRWAY ASSESSMENT:  Yes





MALLAMPATI SCORE


MALLAMPATI SCORE:  II





PRE-SEDATION ASSESSMENT


PRE-SEDATION ASSESSMENT:  Yes











NOVA ESPINOSA MD            Oct 9, 2020 14:02

## 2020-10-09 NOTE — PDOC
PROGRESS NOTES


Date of Service:


DATE: 10/9/20 


TIME: 11:38





Chief Complaint


Chief Complaint


Assessment/Plan


Unstable angina


Hyperkalemia


Elevated BNP





History of Present Illness


Patient is 60-year-old male with past medical history CABG, CHF, with recent 

AICD placement, who presents with complaints of left-sided chest pain since 

yesterday.  He reports sharp chest pain, 7/10, radiates to his left neck.  He 

reports some associated shortness of breath and diaphoresis.  He took his home 

nitroglycerin and aspirin without any improvement in his pain.  He denies any 

aggravating symptoms, but states his pain has improved with medications that he 

received in the ER.  He also notes a history of methamphetamine abuse, but 

states his last usage was 5 weeks ago.  He denies any associated nausea or 

vomiting.  He states he did not feel his AICD fire.








Troponin <0.017 x 2, K 5.2


Chest x-ray on admission shows no acute process





10/9: No acute events reported overnight, case discussed with nursing staff 

patient in no acute distress no complaints during my visit.  Patient will un

dergo a cardiac catheterization today.  Patient seems to be drug-seeking and we 

will address this issue.  Patient has history of drug abuse with his last use of

amphetamine reported 5 weeks ago.  We will try to get a urine drug panel at this

time





Vitals


Vitals





Vital Signs








  Date Time  Temp Pulse Resp B/P (MAP) Pulse Ox O2 Delivery O2 Flow Rate FiO2


 


10/9/20 10:36      Room Air  


 


10/9/20 08:25  85      


 


10/9/20 07:00 97.6  16 112/76 (88) 98   





 97.6       











Physical Exam


Physical Exam


Gen.: well-developed well-nourished in no apparent distress


Head: Normal shape atraumatic


Eyes: Pupils equal reactive to light and accommodation, normal conjunctivae and 

lids


Ears: Normal shape


Nose: Normal shape no trauma


Mouth: No exudates of the back of throat no thrush no lesions


Neck: Supple no JVD no carotid bruit or lymphadenopathy no thyromegaly


Chest: Lungs clear to auscultation with good inspiratory effort no crackles 

rales or rhonchi


Cardiovascular: S1-S2 regular rhythm no murmurs gallops or rubs


Abdomen: Bowel sounds present soft nontender no hepatosplenomegaly appreciated 

sign Extremities: No clubbing no cyanosis no edema peripheral pulses palpated 

bilaterally


Neurological: Alert awake oriented in person time place and situation, cranial 

nerves II through XII intact, no motor or sensory deficits appreciated


Psych: Appropriate mood, cooperative


Lungs:  Clear





Assessment and Plan


Assessmemt and Plan


Problems


Medical Problems:


(1) Anemia


Status: Acute  





(2) Chest pain


Status: Acute  











Comment


Review of Relevant


I have reviewed the following items luke (where applicable) has been applied.


Labs





Laboratory Tests








Test


 10/7/20


19:45 10/7/20


19:50 10/8/20


02:58


 


White Blood Count


 5.0 x10^3/uL


(4.0-11.0) 


 





 


Red Blood Count


 4.21 x10^6/uL


(4.30-5.70) 


 





 


Hemoglobin


 11.0 g/dL


(13.0-17.5) 


 





 


Hematocrit


 33.3 %


(39.0-53.0) 


 





 


Mean Corpuscular Volume 79 fL ()   


 


Mean Corpuscular Hemoglobin 26 pg (25-35)   


 


Mean Corpuscular Hemoglobin


Concent 33 g/dL


(31-37) 


 





 


Red Cell Distribution Width


 22.0 %


(11.5-14.5) 


 





 


Platelet Count


 220 x10^3/uL


(140-400) 


 





 


Neutrophils (%) (Auto) 54 % (31-73)   


 


Lymphocytes (%) (Auto) 25 % (24-48)   


 


Monocytes (%) (Auto) 16 % (0-9)   


 


Eosinophils (%) (Auto) 4 % (0-3)   


 


Basophils (%) (Auto) 1 % (0-3)   


 


Neutrophils # (Auto)


 2.7 x10^3/uL


(1.8-7.7) 


 





 


Lymphocytes # (Auto)


 1.3 x10^3/uL


(1.0-4.8) 


 





 


Monocytes # (Auto)


 0.8 x10^3/uL


(0.0-1.1) 


 





 


Eosinophils # (Auto)


 0.2 x10^3/uL


(0.0-0.7) 


 





 


Basophils # (Auto)


 0.1 x10^3/uL


(0.0-0.2) 


 





 


Platelet Estimate


 Adequate


(ADEQUATE) 


 





 


Hypochromasia Slight   


 


Anisocytosis Mod   


 


Sodium Level


 139 mmol/L


(136-145) 


 





 


Potassium Level


 5.2 mmol/L


(3.5-5.1) 


 





 


Chloride Level


 103 mmol/L


() 


 





 


Carbon Dioxide Level


 26 mmol/L


(21-32) 


 





 


Anion Gap 10 (6-14)   


 


Blood Urea Nitrogen 9 mg/dL (8-26)   


 


Creatinine


 1.1 mg/dL


(0.7-1.3) 


 





 


Estimated GFR


(Cockcroft-Gault) 68.3 


 


 





 


BUN/Creatinine Ratio 8 (6-20)   


 


Glucose Level


 88 mg/dL


(70-99) 


 





 


Calcium Level


 9.1 mg/dL


(8.5-10.1) 


 





 


Magnesium Level


 2.2 mg/dL


(1.8-2.4) 


 





 


Total Bilirubin


 0.2 mg/dL


(0.2-1.0) 


 





 


Aspartate Amino Transf


(AST/SGOT) 16 U/L (15-37) 


 


 





 


Alanine Aminotransferase


(ALT/SGPT) 20 U/L (16-63) 


 


 





 


Alkaline Phosphatase


 98 U/L


() 


 





 


Troponin I Quantitative


 < 0.017 ng/mL


(0.000-0.055) 


 < 0.017 ng/mL


(0.000-0.055)


 


NT-Pro-B-Type Natriuretic


Peptide 1017 pg/mL


(0-124) 


 





 


Total Protein


 7.5 g/dL


(6.4-8.2) 


 





 


Albumin


 3.7 g/dL


(3.4-5.0) 


 





 


Albumin/Globulin Ratio 1.0 (1.0-1.7)   


 


Bedside Troponin I


 


 0.01 ng/ml


(<0.08) 











Medications





Current Medications


Morphine Sulfate (Morphine Sulfate) 4 mg 1X  ONCE IV  Last administered on 

10/7/20at 20:14;  Start 10/7/20 at 20:00;  Stop 10/7/20 at 20:01;  Status DC


Ketorolac Tromethamine (Toradol 15mg Vial) 15 mg 1X  ONCE IVP  Last administered

on 10/8/20at 01:46;  Start 10/8/20 at 02:00;  Stop 10/8/20 at 02:01;  Status DC


Morphine Sulfate (Morphine Sulfate) 4 mg PRN Q2HR  PRN IV PAIN Last administered

on 10/9/20at 10:36;  Start 10/8/20 at 08:00


Nitroglycerin (Nitrostat) 0.4 mg PRN Q5MIN  PRN SL CHEST PAIN;  Start 10/8/20 at

08:15;  Stop 10/8/20 at 09:58;  Status DC


Aspirin (Aspirin Chewable) 81 mg DAILY PO  Last administered on 10/9/20at 08:23;

 Start 10/8/20 at 10:30


Clopidogrel Bisulfate (Plavix) 75 mg DAILY PO  Last administered on 10/9/20at 

08:23;  Start 10/8/20 at 10:30


Metoprolol Succinate (Toprol Xl) 12.5 mg DAILY PO  Last administered on 10/

9/20at 08:25;  Start 10/8/20 at 10:30


Nitroglycerin (Nitrostat) 0.4 mg PRN Q5MIN  PRN SL CHEST PAIN;  Start 10/8/20 at

10:00


Tramadol HCl (Ultram) 100 mg PRN TID  PRN PO PAIN;  Start 10/8/20 at 10:00


Pregabalin (Lyrica) 300 mg BID PO  Last administered on 10/8/20at 21:34;  Start 

10/8/20 at 21:00


Atorvastatin Calcium (Lipitor) 80 mg QHS PO  Last administered on 10/8/20at 

21:34;  Start 10/8/20 at 21:00


Non-Formulary Medication (Tiotropium Bromide (Spiriva)) 1 cap DAILY IH ;  Start 

10/9/20 at 09:00;  Status UNV


Albuterol/ Ipratropium (Duoneb) 3 ml RTQID NEB ;  Start 10/8/20 at 12:00;  Stop 

10/8/20 at 11:37;  Status DC


Furosemide (Lasix) 20 mg 1X  ONCE IVP ;  Start 10/8/20 at 10:30;  Stop 10/8/20 

at 10:31;  Status DC


Albuterol/ Ipratropium (Duoneb) 3 ml PRN 1X  PRN NEB WHEEZING;  Start 10/8/20 at

12:00


Ipratropium Bromide (Atrovent) 0.5 mg RTQID NEB ;  Start 10/8/20 at 12:00





Active Scripts


Active


Crestor (Rosuvastatin Calcium) 40 Mg Tablet 1 Tab PO DAILY


Metoprolol Tartrate 25 Mg Tablet 0.5 Tab PO BID 10 Days


Aspirin 81 Mg Tab.chew 1 Tab PO DAILY


Clopidogrel (Clopidogrel Bisulfate) 75 Mg Tablet 1 Tab PO DAILY


Tramadol Hcl 50 Mg Tablet 100 Mg PO PRN TID PRN


Reported


Crestor (Rosuvastatin Calcium) 40 Mg Tablet 40 Mg PO HS


Metoprolol Succinate ( Xl ) (Metoprolol Succinate) 25 Mg Tab.er.24h 12.5 Mg PO 

DAILY


Spiriva (Tiotropium [place]) 18 Mcg Cap.w.dev 1 Cap IH DAILY


NITROGLYCERIN SubLingual (Nitroglycerin) 0.4 Mg Tab.subl 0.4 Mg SL PRN Q5MIN PRN


Lyrica (Pregabalin) 300 Mg Capsule 1 Cap PO BID


Plavix (Clopidogrel Bisulfate) 75 Mg Tablet 75 Mg PO DAILY


Aspir 81 (Aspirin) 81 Mg Tablet.dr 1 Tab PO DAILY


Vitals/I & O





Vital Sign - Last 24 Hours








 10/8/20 10/8/20 10/8/20 10/8/20





 13:25 13:26 14:40 15:36


 


Temp   97.7 





   97.7 


 


Pulse 87  74 


 


Resp 20 18 20 


 


B/P (MAP) 107/62 (77)  114/68 (83) 


 


Pulse Ox 99 94 98 94


 


O2 Delivery Room Air Room Air Room Air Room Air


 


    





    





 10/8/20 10/8/20 10/8/20 10/8/20





 16:06 16:50 17:28 17:58


 


Pulse Ox 94  94 94


 


O2 Delivery Room Air Room Air Room Air Room Air





 10/8/20 10/8/20 10/8/20 10/8/20





 19:30 19:55 20:00 20:00


 


Temp  97.8  





  97.8  


 


Pulse  70  


 


Resp 19 18 20 


 


B/P (MAP)  109/58 (75)  


 


Pulse Ox  99  


 


O2 Delivery Room Air Room Air Room Air Room Air


 


    





    





 10/8/20 10/8/20 10/8/20 10/8/20





 21:35 22:05 23:15 23:40


 


Temp   98.0 





   98.0 


 


Pulse   79 


 


Resp 18 19 18 20


 


B/P (MAP)   104/64 (77) 


 


Pulse Ox   98 


 


O2 Delivery Room Air Room Air Room Air Room Air


 


    





    





 10/9/20 10/9/20 10/9/20 10/9/20





 00:10 02:58 03:00 03:02


 


Temp   98.1 





   98.1 


 


Pulse   81 89


 


Resp 19 19 20 


 


B/P (MAP)   97/53 (68) 97/53 (68)


 


Pulse Ox   96 


 


O2 Delivery Room Air Room Air Room Air 


 


    





    





 10/9/20 10/9/20 10/9/20 10/9/20





 06:03 06:33 07:00 08:25


 


Temp   97.6 





   97.6 


 


Pulse   83 85


 


Resp 19 19 16 


 


B/P (MAP)   112/76 (88) 


 


Pulse Ox   98 


 


O2 Delivery Room Air Room Air Room Air 


 


    





    





 10/9/20   





 10:36   


 


O2 Delivery Room Air   














Intake and Output   


 


 10/8/20 10/8/20 10/9/20





 15:00 23:00 07:00


 


Intake Total 500 ml 1460 ml 240 ml


 


Balance 500 ml 1460 ml 240 ml











Justicifation of Admission Dx:


Justifications for Admission:


Justification of Admission Dx:  N/A


Angina:  Symp at Rest











STACI BENITO MD              Oct 9, 2020 11:40

## 2020-10-10 VITALS — DIASTOLIC BLOOD PRESSURE: 71 MMHG | SYSTOLIC BLOOD PRESSURE: 116 MMHG

## 2020-10-10 VITALS — DIASTOLIC BLOOD PRESSURE: 71 MMHG | SYSTOLIC BLOOD PRESSURE: 118 MMHG

## 2020-10-10 VITALS — SYSTOLIC BLOOD PRESSURE: 120 MMHG | DIASTOLIC BLOOD PRESSURE: 57 MMHG

## 2020-10-10 RX ADMIN — ASPIRIN 81 MG SCH MG: 81 TABLET ORAL at 08:18

## 2020-10-10 RX ADMIN — MORPHINE SULFATE PRN MG: 2 INJECTION, SOLUTION INTRAMUSCULAR; INTRAVENOUS at 00:35

## 2020-10-10 RX ADMIN — MORPHINE SULFATE PRN MG: 2 INJECTION, SOLUTION INTRAMUSCULAR; INTRAVENOUS at 04:39

## 2020-10-10 RX ADMIN — SODIUM CHLORIDE SCH MLS/HR: 450 INJECTION, SOLUTION INTRAVENOUS at 05:26

## 2020-10-10 RX ADMIN — METOPROLOL SUCCINATE SCH MG: 25 TABLET, EXTENDED RELEASE ORAL at 08:19

## 2020-10-10 RX ADMIN — CLOPIDOGREL BISULFATE SCH MG: 75 TABLET ORAL at 08:18

## 2020-10-10 RX ADMIN — PREGABALIN SCH MG: 75 CAPSULE ORAL at 08:18

## 2020-10-10 RX ADMIN — MORPHINE SULFATE PRN MG: 2 INJECTION, SOLUTION INTRAMUSCULAR; INTRAVENOUS at 08:22

## 2020-10-10 NOTE — NUR
discharge instructions discussed with patient. patient stable at time of discharge. patient 
given post cardiac cath instructions. iv out and tele monitor off. patient informed to 
follow up with his pcp in 1-2 weeks and his cardiologist within 4 weeks. no questions at 
time of discharge. patient escorted to transportation vehicle per ambulation.

## 2020-10-10 NOTE — PDOC3
Discharge Summary


Visit Information


Date of Admission:  Oct 8, 2020


Date of Discharge:  Oct 10, 2020


Admitting Diagnosis Comment:


Unstable angina


Hyperkalemia


Elevated BNP


Final Diagnosis


Problems


Medical Problems:


(1) Ischemic cardiomyopathy with ejection fraction recorded at 25%


Status: Acute  





(2) Chest pain resolved 


Status: Acute  





Chronic systolic compensated heart failure


AICD in place


History of V. tach


Essential hypertension


Dyslipidemia


History of meth abuse


Tobacco abuse


COPD





Brief Hospital Course


Allergies





                                    Allergies








Coded Allergies Type Severity Reaction Last Updated Verified


 


  acetaminophen Allergy Intermediate  10/8/20 Yes


 


  albuterol Adverse Reaction Intermediate  20 Yes


 


  ibuprofen Adverse Reaction Intermediate Nausea and Vomiting 20 Yes








Vital Signs





Vital Signs








  Date Time  Temp Pulse Resp B/P (MAP) Pulse Ox O2 Delivery O2 Flow Rate FiO2


 


10/10/20 08:22     97 Room Air  


 


10/10/20 08:19  88  118/71    


 


10/10/20 07:00 98.0  18     





 98.0       


 


10/9/20 13:51       2.0 








Brief Hospital Course


History of Present Illness


History of Present Illness


Patient is 60-year-old male with past medical history CABG, CHF, with recent 

AICD placement, who presents with complaints of left-sided chest pain since 

yesterday.  He reports sharp chest pain, 7/10, radiates to his left neck.  He 

reports some associated shortness of breath and diaphoresis.  He took his home 

nitroglycerin and aspirin without any improvement in his pain.  He denies any 

aggravating symptoms, but states his pain has improved with medications that he 

received in the ER.  He also notes a history of methamphetamine abuse, but 

states his last usage was 5 weeks ago.  He denies any associated nausea or 

vomiting.  He states he did not feel his AICD fire.








Troponin <0.017 x 2, K 5.2


Chest x-ray on admission shows no acute process





Admitted to the telemetry floor where he was seen in consultation by cardiology.

 The patient underwent a cardiac catheterization on 10/9/2020 with no new 

significant flow obstructing lesions.  The patient had a chronically occluded 

vessel that was not amenable for intervention.  He was deemed appropriate for 

discharge from the cardiological standpoint of view with maximal medical 

therapy.  Patient lives in the [facility] and apparently is going to go 

back home once case management can help us with the details.  He is in no acute 

distress the patient denies any chest discomfort no angina type of symptoms at 

the time of discharge.  Signs and symptoms of concern when to seek medical 

attention were discussed prior to discharge, all concerns addressed to the best 

of my abilities.





Exam





Gen.: well-developed well-nourished in no apparent distress


Head: Normal shape atraumatic


Eyes: Pupils equal reactive to light and accommodation, normal conjunctivae and 

lids


Ears: Normal shape


Nose: Normal shape no trauma


Mouth: No exudates of the back of throat no thrush no lesions


Neck: Supple no JVD no carotid bruit or lymphadenopathy no thyromegaly


Chest: Lungs clear to auscultation with good inspiratory effort no crackles 

rales or rhonchi


Cardiovascular: S1-S2 regular rhythm no murmurs gallops or rubs


Abdomen: Bowel sounds present soft nontender no hepatosplenomegaly appreciated 

sign Extremities: No clubbing no cyanosis no edema peripheral pulses palpated 

bilaterally


Neurological: Alert awake oriented in person time place and situation, cranial 

nerves II through XII intact, no motor or sensory deficits appreciated


Psych: Appropriate mood, cooperative





Assessment


Assessment


                                 IMAGING REPORT





                                     Signed





PATIENT: JOB RIOS   ACCOUNT: [id number]     MRN#: [id number]


: 1960           LOCATION: ER              AGE: 60


SEX: M                    EXAM DT: 10/07/20         ACCESSION#: [id number]


STATUS: REG ER            ORD. PHYSICIAN: JEROD BULLARD DO


REASON: cp


PROCEDURE: PORTABLE CHEST 1V





PORTABLE CHEST 1V


 


History: Chest pain


 


Comparison: 2020


 


Findings:


Single view of the chest is submitted. 


There is again triple lead left electronic cardiac device. Pericardial 


cardiac silhouette is again somewhat prominent although unchanged. There 


is no dependent pleural fluid, pneumothorax, or lobar infiltrate.


 


Impression: 


 


1.  No acute radiographic abnormality is identified.


 


Electronically signed by: Jeremiah Dubose MD (10/7/2020 8:21 PM) [facility]











Discharge Information


Condition at Discharge:  Improved


Follow Up:  Weeks


Disposition/Orders:  D/C to Home


Scheduled


Aspirin (Aspir 81) 81 Mg Tablet.dr, 1 TAB PO DAILY, #30 Ref 5 (Reported)


   Entered as Reported by: Madelyn Sy on 4/17/15 [id number]


   Last Action: HELD on 10/8/20 0950 by SG OLIVO MD


Aspirin (Aspirin) 81 Mg Tab.chew, 1 TAB PO DAILY, #10


   Prescribed by: ELROY NORIEGA D.O. on 20


   Last Action: Continued on 10/8/20 0950 by SG OLIVO MD


Clopidogrel Bisulfate (Plavix) 75 Mg Tablet, 75 MG PO DAILY for TO PREVENT BLOOD

CLOTS, #30 Ref 0 (Reported)


   Entered as Reported by: Madelyn Sy on 4/17/15 2308


   Last Action: Continued on 10/8/20 0950 by SG OLIVO MD


Clopidogrel Bisulfate (Clopidogrel) 75 Mg Tablet, 1 TAB PO DAILY, #10


   Prescribed by: ELROY NORIEGA D.O. on 20


   Last Action: HELD on 10/8/20 0950 by SG OLIVO MD


Metoprolol Succinate (Metoprolol Succinate ( Xl )) 25 Mg Tab.er.24h, 12.5 MG PO 

DAILY for FOR HYPERTENSION, #30 Ref 0 (Reported)


   Entered as Reported by: DEBBIE SLOAN [facility] on 2014


   Last Action: Continued on 10/8/20 0950 by SG OLIVO MD


Metoprolol Tartrate (Metoprolol Tartrate) 25 Mg Tablet, 0.5 TAB PO BID for 10 

Days, #10


   Prescribed by: ELROY NORIEGA D.O. on 20


   Last Action: HELD on 10/8/20 0950 by SG OLIVO MD


Pregabalin (Lyrica) 300 Mg Capsule, 1 CAP PO BID, #60 Ref 2 (Reported)


   Entered as Reported by: TOM MONTE on 16


   Last Action: Converted on 10/8/20 0950 by SG OLIVO MD


Rosuvastatin Calcium (Crestor) 40 Mg Tablet, 40 MG PO HS for FOR CHOLESTEROL, 

#30 Ref 0 (Reported)


   Entered as Reported by: BARBI MANE RN on 20 1803


   Last Action: Converted on 10/8/20 0950 by SG OLIVO MD


Rosuvastatin Calcium (Crestor) 40 Mg Tablet, 1 TAB PO DAILY, #10


   Prescribed by: ELROY NORIEGA D.O. on 20


   Last Action: HELD on 10/8/20 0950 by SG OLIVO MD


Tiotropium Bromide (Spiriva) 18 Mcg Cap.w.dev, 1 CAP IH DAILY, #30 Ref 3 

(Reported)


   Entered as Reported by: TOM MONTE on 16


   Last Action: Converted on 10/8/20 0950 by SG OLIVO MD





Scheduled PRN


Nitroglycerin (NITROGLYCERIN SubLingual) 0.4 Mg Tab.subl, 0.4 MG SL PRN Q5MIN 

PRN for CHEST PAIN, (Reported)


   Entered as Reported by: TOM MONTE on 16


   Last Action: Continued on 10/8/20 0950 by SG OLIVO MD


Tramadol Hcl (Tramadol Hcl) 50 Mg Tablet, 100 MG PO PRN TID PRN for PAIN, #30


   Prescribed by: ELAYNE NUNEZ on 8/10/20 0844


   Last Action: Continued on 10/8/20 0950 by SG OLIVO MD





Justicifation of Admission Dx:


Justifications for Admission:


Justification of Admission Dx:  N/A


Angina:  Symp at Rest











STACI BENITO MD             Oct 10, 2020 10:44

## 2020-10-20 ENCOUNTER — HOSPITAL ENCOUNTER (OUTPATIENT)
Dept: HOSPITAL 61 - ER | Age: 60
Setting detail: OBSERVATION
LOS: 1 days | Discharge: HOME | End: 2020-10-21
Attending: FAMILY MEDICINE | Admitting: FAMILY MEDICINE
Payer: MEDICAID

## 2020-10-20 VITALS — HEIGHT: 67 IN | WEIGHT: 146.17 LBS | BODY MASS INDEX: 22.94 KG/M2

## 2020-10-20 DIAGNOSIS — J44.9: ICD-10-CM

## 2020-10-20 DIAGNOSIS — I50.22: ICD-10-CM

## 2020-10-20 DIAGNOSIS — R07.9: Primary | ICD-10-CM

## 2020-10-20 DIAGNOSIS — Z86.73: ICD-10-CM

## 2020-10-20 DIAGNOSIS — I11.0: ICD-10-CM

## 2020-10-20 DIAGNOSIS — I34.0: ICD-10-CM

## 2020-10-20 DIAGNOSIS — Z95.5: ICD-10-CM

## 2020-10-20 DIAGNOSIS — E78.00: ICD-10-CM

## 2020-10-20 DIAGNOSIS — I25.10: ICD-10-CM

## 2020-10-20 DIAGNOSIS — Z90.81: ICD-10-CM

## 2020-10-20 DIAGNOSIS — Z95.1: ICD-10-CM

## 2020-10-20 DIAGNOSIS — E78.5: ICD-10-CM

## 2020-10-20 DIAGNOSIS — E87.2: ICD-10-CM

## 2020-10-20 DIAGNOSIS — Z95.810: ICD-10-CM

## 2020-10-20 DIAGNOSIS — F17.210: ICD-10-CM

## 2020-10-20 LAB
ALBUMIN SERPL-MCNC: 4.4 G/DL (ref 3.4–5)
ALBUMIN/GLOB SERPL: 1.1 {RATIO} (ref 1–1.7)
ALP SERPL-CCNC: 118 U/L (ref 46–116)
ALT SERPL-CCNC: 19 U/L (ref 16–63)
AMPHETAMINE/METHAMPHETAMINE: (no result)
ANION GAP SERPL CALC-SCNC: 14 MMOL/L (ref 6–14)
ANISOCYTOSIS BLD QL SMEAR: SLIGHT
APTT BLD: 37 SEC (ref 24–38)
AST SERPL-CCNC: 17 U/L (ref 15–37)
BARBITURATES UR-MCNC: (no result) UG/ML
BASOPHILS # BLD AUTO: 0.1 X10^3/UL (ref 0–0.2)
BASOPHILS NFR BLD: 1 % (ref 0–3)
BENZODIAZ UR-MCNC: (no result) UG/L
BILIRUB SERPL-MCNC: 0.4 MG/DL (ref 0.2–1)
BUN SERPL-MCNC: 9 MG/DL (ref 8–26)
BUN/CREAT SERPL: 10 (ref 6–20)
CALCIUM SERPL-MCNC: 9.7 MG/DL (ref 8.5–10.1)
CANNABINOIDS UR-MCNC: (no result) UG/L
CHLORIDE SERPL-SCNC: 99 MMOL/L (ref 98–107)
CO2 SERPL-SCNC: 24 MMOL/L (ref 21–32)
COCAINE UR-MCNC: (no result) NG/ML
CREAT SERPL-MCNC: 0.9 MG/DL (ref 0.7–1.3)
EOSINOPHIL NFR BLD: 0.2 X10^3/UL (ref 0–0.7)
EOSINOPHIL NFR BLD: 2 % (ref 0–3)
ERYTHROCYTE [DISTWIDTH] IN BLOOD BY AUTOMATED COUNT: 20.6 % (ref 11.5–14.5)
GFR SERPLBLD BASED ON 1.73 SQ M-ARVRAT: 86.1 ML/MIN
GLUCOSE SERPL-MCNC: 90 MG/DL (ref 70–99)
HCT VFR BLD CALC: 34.3 % (ref 39–53)
HGB BLD-MCNC: 11.5 G/DL (ref 13–17.5)
LIPASE: 176 U/L (ref 73–393)
LYMPHOCYTES # BLD: 1.4 X10^3/UL (ref 1–4.8)
LYMPHOCYTES NFR BLD AUTO: 15 % (ref 24–48)
MAGNESIUM SERPL-MCNC: 2.2 MG/DL (ref 1.8–2.4)
MCH RBC QN AUTO: 26 PG (ref 25–35)
MCHC RBC AUTO-ENTMCNC: 34 G/DL (ref 31–37)
MCV RBC AUTO: 78 FL (ref 79–100)
METHADONE SERPL-MCNC: (no result) NG/ML
MICROCYTES BLD QL SMEAR: SLIGHT
MONO #: 0.8 X10^3/UL (ref 0–1.1)
MONOCYTES NFR BLD: 9 % (ref 0–9)
NEUT #: 6.6 X10^3/UL (ref 1.8–7.7)
NEUTROPHILS NFR BLD AUTO: 73 % (ref 31–73)
OPIATES UR-MCNC: (no result) NG/ML
PCP SERPL-MCNC: (no result) MG/DL
PLATELET # BLD AUTO: 327 X10^3/UL (ref 140–400)
PLATELET # BLD EST: ADEQUATE 10*3/UL
POTASSIUM SERPL-SCNC: 4.2 MMOL/L (ref 3.5–5.1)
PROT SERPL-MCNC: 8.3 G/DL (ref 6.4–8.2)
PROTHROMBIN TIME: 13.3 SEC (ref 11.7–14)
RBC # BLD AUTO: 4.38 X10^6/UL (ref 4.3–5.7)
SODIUM SERPL-SCNC: 137 MMOL/L (ref 136–145)
WBC # BLD AUTO: 9.1 X10^3/UL (ref 4–11)

## 2020-10-20 PROCEDURE — 99285 EMERGENCY DEPT VISIT HI MDM: CPT

## 2020-10-20 PROCEDURE — 80053 COMPREHEN METABOLIC PANEL: CPT

## 2020-10-20 PROCEDURE — 93005 ELECTROCARDIOGRAM TRACING: CPT

## 2020-10-20 PROCEDURE — 83605 ASSAY OF LACTIC ACID: CPT

## 2020-10-20 PROCEDURE — 83735 ASSAY OF MAGNESIUM: CPT

## 2020-10-20 PROCEDURE — 85610 PROTHROMBIN TIME: CPT

## 2020-10-20 PROCEDURE — 71275 CT ANGIOGRAPHY CHEST: CPT

## 2020-10-20 PROCEDURE — 83690 ASSAY OF LIPASE: CPT

## 2020-10-20 PROCEDURE — 96361 HYDRATE IV INFUSION ADD-ON: CPT

## 2020-10-20 PROCEDURE — G0378 HOSPITAL OBSERVATION PER HR: HCPCS

## 2020-10-20 PROCEDURE — 36415 COLL VENOUS BLD VENIPUNCTURE: CPT

## 2020-10-20 PROCEDURE — 85025 COMPLETE CBC W/AUTO DIFF WBC: CPT

## 2020-10-20 PROCEDURE — 85730 THROMBOPLASTIN TIME PARTIAL: CPT

## 2020-10-20 PROCEDURE — 96374 THER/PROPH/DIAG INJ IV PUSH: CPT

## 2020-10-20 PROCEDURE — 80061 LIPID PANEL: CPT

## 2020-10-20 PROCEDURE — 80307 DRUG TEST PRSMV CHEM ANLYZR: CPT

## 2020-10-20 PROCEDURE — 83880 ASSAY OF NATRIURETIC PEPTIDE: CPT

## 2020-10-20 PROCEDURE — 96375 TX/PRO/DX INJ NEW DRUG ADDON: CPT

## 2020-10-20 PROCEDURE — 96376 TX/PRO/DX INJ SAME DRUG ADON: CPT

## 2020-10-20 PROCEDURE — G0379 DIRECT REFER HOSPITAL OBSERV: HCPCS

## 2020-10-20 PROCEDURE — 84484 ASSAY OF TROPONIN QUANT: CPT

## 2020-10-20 RX ADMIN — FENTANYL CITRATE PRN MCG: 50 INJECTION INTRAMUSCULAR; INTRAVENOUS at 22:58

## 2020-10-20 NOTE — PHYS DOC
Past Medical History


Past Medical History:  Asthma, CAD, CHF, CVA, High Cholesterol, Hypertension, 

MI, Other


Additional Past Medical Histor:  VTACH, Drug abuse-Methamphetamine


Past Surgical History:  Pacemaker, Splenectomy, Other


Additional Past Surgical Histo:  12 cardiac stents, multiple cardiac 

catheterizations,WITH DEFIB


Smoking Status:  Current Every Day Smoker


Alcohol Use:  Rarely


Drug Use:  Methamphetamine





General Adult


EDM:


Chief Complaint:  CHEST PAIN





HPI:


HPI:





Patient is 60-year-old male with past medical history CABG, CHF, with recent 

AICD placement, who presents with complaints of left-sided chest pain starting 1

hr prior to arrival. Pt reports that pain radiates up his neck and down his left

arm. Reports associated diaphoresis. Pt took 4 Nitro and 325mg Aspirin without 

any relief.  Denies any nausea, vomiting or diarrhea.   Reports history of 

recent admission to [facility].





Review of Systems:


Review of Systems:





Constitutional: Denies fever or chills 


Eyes: Denies redness or eye pain 


HENT: Denies nasal congestion or sore throat


Respiratory: Denies cough or shortness of breath 


Cardiovascular: Reports chest pain; denies palpitations


GI: Denies abdominal pain, nausea, or vomiting


: Denies dysuria or hematuria


Musculoskeletal: Denies back pain or joint pain


Integument: Denies rash or skin lesions 


Neurologic: Denies headache, focal weakness or sensory changes





Complete systems were reviewed and found to be within normal limits, except as 

documented in this note.





Heart Score:


HEART Score for Chest Pain:  








HEART Score for Chest Pain Response (Comments) Value


 


History Moderately Suspicious 1


 


ECG Normal 0


 


Age >45 - < 65 1


 


Risk Factors >3 Risk Factors or Hx CAD 2


 


Troponin < Normal Limit 0


 


Total  4








Risk Factors:


Risk Factors:  DM, Current or recent (<one month) smoker, HTN, HLP, family 

history of CAD, obesity.


Risk Scores:


Score 0 - 3:  2.5% MACE over next 6 weeks - Discharge Home


Score 4 - 6:  20.3% MACE over next 6 weeks - Admit for Clinical Observation


Score 7 - 10:  72.7% MACE over next 6 weeks - Early Invasive Strategies





Current Medications:





Current Medications








 Medications


  (Trade)  Dose


 Ordered  Sig/Gwen  Start Time


 Stop Time Status Last Admin


Dose Admin


 


 Fentanyl Citrate


  (Fentanyl 2ml


 Vial)  50 mcg  1X  ONCE  10/20/20 19:30


 10/20/20 19:34 DC  





 


 Sodium Chloride  1,000 ml @ 


 1,000 mls/hr  1X  ONCE  10/20/20 19:30


 10/20/20 20:29   














Allergies:


Allergies:





Allergies








Coded Allergies Type Severity Reaction Last Updated Verified


 


  acetaminophen Allergy Intermediate  10/8/20 Yes


 


  albuterol Adverse Reaction Intermediate  1/22/20 Yes


 


  ibuprofen Adverse Reaction Intermediate Nausea and Vomiting 1/22/20 Yes











Physical Exam:


PE:





Constitutional: Well developed, well nourished, mild acute distress, non-toxic 

appearance


HENT: Normocephalic, atraumatic


Eyes: PERRL, EOMI, conjunctiva normal, no discharge


Neck: Normal range of motion, no tenderness, supple


Cardiovascular: Regular paced rate and rhythm, 2+ radial and pedal pulses, 

Pacemaker in place


Lungs & Thorax:  No respiratory distress, equal chest rise and fall


Abdomen: Soft, no tenderness


Skin: Warm, no erythema, no rash, diaphoretic chest


Extremities: No tenderness, ROM intact, no edema


Neurologic: Alert and oriented X 3, normal motor function, normal sensory 

function, no focal deficits noted, resting tremor


Psychologic: Affect normal, judgment normal





EKG:


EKG:





EKG @ 1920, paced rhythm at 103 BPM, occasional PVCs with artifact





Radiology/Procedures:


Radiology/Procedures:


PROCEDURE: CT ANGIOGRAPHY CHEST





Exam: CT of chest with contrast


 


INDICATION: Chest


 


TECHNIQUE: Sequential axial images through the head obtained following the


administration 100 mL of Omni 350 IV contrast. Sagittal and coronal 


reformatted images were reconstructed from the axial data and reviewed. 


3-D reformatted images were reconstructed from the axial data and 


reviewed.


 


Comparisons: None


 


FINDINGS:


Visualized portions of the thyroid are unremarkable. No enlarged 


mediastinal lymph nodes are identified.


 


Heart is mildly enlarged. Right psoas heart leads are noted. No 


pericardial effusion. Moderate coronary artery calcifications are seen. 


Thoracic aorta has a normal course and caliber. Pulmonary artery is not 


enlarged. No pulmonary embolus identified within the main, lobar or 


segmental pulmonary arteries.


 


Airways are patent. No consolidation or pneumothorax. No suspicious lung 


nodules are identified.


 


No pleural effusion or thickening.


 


Visualized upper abdomen is unremarkable.


 


No suspicious osseous lesions or acute fractures.


 


IMPRESSION:


No pulmonary embolus identified within the main, lobar or segmental 


pulmonary arteries.


 


 


Exposure: One or more of the following in the visualized dose reduction 


techniques were utilized for this examination:


1.  Automated exposure control


2.  Adjustment of the MA and/or KV according to patient size


3.  Use of iterative of reconstructive technique


 


Electronically signed by: Sinan Trevino MD (10/20/2020 8:56 PM) [facility]





Course & Med Decision Making:


Course & Med Decision Making


Cardiac Catheterization on 10/9/20


Conclusion


1.  Patent previously placed stents in the left anterior descending artery, 

diagonal branch and left posterior descending artery.  The obtuse marginal 

branch and nondominant right coronary artery showed 100% chronic total 

occlusions, described in prior cardiac catheterization.


2.  Severe left ventricular systolic dysfunction with ejection fraction 

estimated at 20 to 25% with 2+ mitral regurgitation.





Recommendations


Optimization of medical therapy for coronary disease and ischemic 

cardiomyopathy.





Pertinent Labs and Imaging studies reviewed. (See chart for details)





Patient is a 60-year-old male with an extensive cardiac history who presents to 

the ED with chest pain.  Took 4 nitro and 325 mg aspirin without any relief. CTA

Chest negative. Lactic acid was elevated at 3.4.  IVF hydration given. Hx of 

recent cardiac cath as above per [facility] review. 





Due to cardiac history and elevated lactic acid, will plan for admission.





Patient requiring admission for further evaluation and treatment. Discussed with

Dr. Barrett (hospitalist) who is in agreement with admission. Discussed 

findings and plan with patient, who acknowledges understanding and agreement.





Dragon Disclaimer:


Dragon Disclaimer:


This electronic medical record was generated, in whole or in part, using a voice

recognition dictation system.





Departure


Departure


Impression:  


   Primary Impression:  


   Chest pain


   Qualified Codes:  R07.9 - Chest pain, unspecified


   Additional Impression:  


   Lactic acidosis


Disposition:  09 ADMITTED AS INPT THIS HOSP


Admitting Physician:  WILFRED (Arnold)


Condition:  STABLE


Referrals:  


NO PCP (PCP)





Critical Care Time


Critical care time was 30 minutes which includes time at bedside, spent in 

discussion of patient's care with specialists and/or family members, with 

interpretation of laboratory and/or radiological studies and is exclusive of 

procedures.











ELROY NORIEGA DO             Oct 20, 2020 19:43

## 2020-10-20 NOTE — RAD
Exam: CT of chest with contrast

 

INDICATION: Chest

 

TECHNIQUE: Sequential axial images through the head obtained following the

administration 100 mL of Omni 350 IV contrast. Sagittal and coronal 

reformatted images were reconstructed from the axial data and reviewed. 

3-D reformatted images were reconstructed from the axial data and 

reviewed.

 

Comparisons: None

 

FINDINGS:

Visualized portions of the thyroid are unremarkable. No enlarged 

mediastinal lymph nodes are identified.

 

Heart is mildly enlarged. Right psoas heart leads are noted. No 

pericardial effusion. Moderate coronary artery calcifications are seen. 

Thoracic aorta has a normal course and caliber. Pulmonary artery is not 

enlarged. No pulmonary embolus identified within the main, lobar or 

segmental pulmonary arteries.

 

Airways are patent. No consolidation or pneumothorax. No suspicious lung 

nodules are identified.

 

No pleural effusion or thickening.

 

Visualized upper abdomen is unremarkable.

 

No suspicious osseous lesions or acute fractures.

 

IMPRESSION:

No pulmonary embolus identified within the main, lobar or segmental 

pulmonary arteries.

 

 

Exposure: One or more of the following in the visualized dose reduction 

techniques were utilized for this examination:

1.  Automated exposure control

2.  Adjustment of the MA and/or KV according to patient size

3.  Use of iterative of reconstructive technique

 

Electronically signed by: Sinan Trevino MD (10/20/2020 8:56 PM) [facility]

## 2020-10-21 VITALS — DIASTOLIC BLOOD PRESSURE: 53 MMHG | SYSTOLIC BLOOD PRESSURE: 116 MMHG

## 2020-10-21 VITALS — DIASTOLIC BLOOD PRESSURE: 59 MMHG | SYSTOLIC BLOOD PRESSURE: 105 MMHG

## 2020-10-21 LAB
CHOLEST SERPL-MCNC: 121 MG/DL (ref 0–200)
CHOLEST/HDLC SERPL: 3.6 {RATIO}
HDLC SERPL-MCNC: 34 MG/DL (ref 40–60)
LDLC: 70 MG/DL (ref 0–100)
TRIGL SERPL-MCNC: 83 MG/DL (ref 0–150)
VLDLC: 17 MG/DL (ref 0–40)

## 2020-10-21 RX ADMIN — FENTANYL CITRATE PRN MCG: 50 INJECTION INTRAMUSCULAR; INTRAVENOUS at 08:41

## 2020-10-21 RX ADMIN — FENTANYL CITRATE PRN MCG: 50 INJECTION INTRAMUSCULAR; INTRAVENOUS at 05:34

## 2020-10-21 NOTE — PDOC1
History and Physical


Date of Service:


DOS:


DATE: 10/21/20 


TIME: 08:26





Chief Complaint:


Chief Complain:


chest pain





History of Present Illness:


HPI:


60-year-old male with past medical history CABG, CHF, with recent AICD 

placement, who presents with complaints of left-sided chest pain starting 1 hr 

prior to arrival. Pt reports that pain radiates up his neck and down his left 

arm. Reports associated diaphoresis. Pt took 4 Nitro and 325mg Aspirin without 

any relief.  Denies any nausea, vomiting or diarrhea.   Reports history of 

recent admission to [facility].





Past Medical/Surgical History:


PMH/PSH:


Past Medical History:  Asthma, CAD, CHF, CVA, High Cholesterol, Hypertension, 

MI, VTACH,


Past Surgical History:  Pacemaker, Splenectomy, 12 cardiac stents, multiple 

cardiac catheterizations,WITH DEFIB





Allergies:


Allergies:  


Coded Allergies:  


     acetaminophen (Verified  Allergy, Intermediate, 10/8/20)


     albuterol (Verified  Adverse Reaction, Intermediate, 1/22/20)


   


   Patient states "it speeds my heart up too much"


     ibuprofen (Verified  Adverse Reaction, Intermediate, Nausea and Vomiting, 

1/22/20)





Family History:


Family History:


Reviewed and none reported





Social History:


Social History:


Smoking Status:  Current Every Day Smoker


Alcohol Use:  Rarely


Drug Use:  Methamphetamine





Current Medications:


Current Medications





Current Medications


Sodium Chloride 1,000 ml @  1,000 mls/hr 1X  ONCE IV  Last administered on 

10/20/20at 19:57;  Start 10/20/20 at 19:30;  Stop 10/20/20 at 20:29;  Status DC


Fentanyl Citrate (Fentanyl 2ml Vial) 50 mcg 1X  ONCE IV  Last administered on 

10/20/20at 20:00;  Start 10/20/20 at 19:30;  Stop 10/20/20 at 19:34;  Status DC


Iohexol (Omnipaque 350 Mg/ml) 100 ml 1X  ONCE IV  Last administered on 

10/20/20at 20:43;  Start 10/20/20 at 20:30;  Stop 10/20/20 at 20:31;  Status DC


Info (CONTRAST GIVEN -- Rx MONITORING) 1 each PRN DAILY  PRN MC SEE COMMENTS;  

Start 10/20/20 at 20:30;  Stop 10/22/20 at 20:29


Sodium Chloride 1,000 ml @  1,000 mls/hr 1X  ONCE IV  Last administered on 

10/20/20at 21:27;  Start 10/20/20 at 21:15;  Stop 10/20/20 at 22:14;  Status DC


Ondansetron HCl (Zofran) 4 mg PRN Q8HRS  PRN IV NAUSEA/VOMITING;  Start 10/20/20

at 23:00;  Stop 10/21/20 at 22:59


Fentanyl Citrate (Fentanyl 2ml Vial) 50 mcg PRN Q2HR  PRN IV PAIN Last a

dministered on 10/21/20at 05:34;  Start 10/20/20 at 23:00


Sodium Chloride 1,000 ml @  100 mls/hr 1X  ONCE IV ;  Start 10/20/20 at 23:00;  

Stop 10/21/20 at 08:59





Active Scripts


Active


Crestor (Rosuvastatin Calcium) 40 Mg Tablet 1 Tab PO DAILY


Metoprolol Tartrate 25 Mg Tablet 0.5 Tab PO BID 10 Days


Aspirin 81 Mg Tab.chew 1 Tab PO DAILY


Clopidogrel (Clopidogrel Bisulfate) 75 Mg Tablet 1 Tab PO DAILY


Tramadol Hcl 50 Mg Tablet 100 Mg PO PRN TID PRN


Reported


Crestor (Rosuvastatin Calcium) 40 Mg Tablet 40 Mg PO HS


Metoprolol Succinate ( Xl ) (Metoprolol Succinate) 25 Mg Tab.er.24h 12.5 Mg PO 

DAILY


Spiriva (Tiotropium [place]) 18 Mcg Cap.w.dev 1 Cap IH DAILY


NITROGLYCERIN SubLingual (Nitroglycerin) 0.4 Mg Tab.subl 0.4 Mg SL PRN Q5MIN PRN


Lyrica (Pregabalin) 300 Mg Capsule 1 Cap PO BID


Plavix (Clopidogrel Bisulfate) 75 Mg Tablet 75 Mg PO DAILY


Aspir 81 (Aspirin) 81 Mg Tablet.dr 1 Tab PO DAILY





ROS:


Review of Systems


Review of System


REVIEW OF SYSTEMS:


GENERAL:  Denies weakness


SKIN:  No bruising, hair changes or rashes.


EYES:  No blurred, double or loss of vision.


NOSE AND THROAT:  No history of nosebleeds, hoarseness or sore throat.


HEART:  No history of palpitations, chest pain or shortness of breath on


exertion.


LUNGS:  Denies cough, hemoptysis, wheezing or shortness of breath.


GASTROINTESTINAL:  Denies changes in appetite, nausea, vomiting, diarrhea or


constipation.


GENITOURINARY:  No history of frequency, urgency, hesitancy or nocturia.


NEUROLOGIC:  Denies history of numbness, tingling, or tremor.


PSYCHIATRIC:  No history of panic, anxiety or depression.


ENDOCRINE:  No history of heat or cold intolerance, polyuria or polydipsia.


EXTREMITIES:  Denies joint pain, pain on walking or stiffness.





Physical Exam:


Vital Signs:





Vital Signs








  Date Time  Temp Pulse Resp B/P (MAP) Pulse Ox O2 Delivery O2 Flow Rate FiO2


 


10/21/20 07:10 97.5 86 20 105/59 (74) 99 Room Air  





 97.5       








Physcial Exam:


GEN:   No apparent distress.  Alert and oriented


HEENT:   Normal cephalic, atraumatic, external auditory canals are patent


EYES:   Extraocular muscles are intact, pupil are equally round and reactive to 

light and accommodation


MUSCULOSKELETAL:  Well developed , well nourished, good range of motion


ENDOCRINE:   No thyromegaly was palpated


LYMPHATICS:   No cervical chain or axillary nodes were noted


HEMATOPOIETIC:  No bruising


NECK:   Supple, no JVD, no thyromegaly was noted


LUNGS:   Clear to auscultation in all lung fields without rhonchi or wheezing


HEART:    RRR, S!, S2 present.  Peripheral pulses intact, no obvious murmurs 

noted


ABDOMEN:   Soft, nontender.  Positive bowel sounds, no organomegaly, normal 

bowel sounds


EXTREMITIES:   Without clubbing, cyanosis, or edema.  Pedal pulses intact.  

Negative Homans sign


NEUROLOGIC:   Normal speech and tone.  A&O x 3, moves all extremities, no 

obvious focal deficits


PSYCHIATRIC:   Normal affect, normal mood. Stable


SKIN:   No ulcerations or rashes, good skin turgor, no jaundice


VASCULAR:   Good capillary refill, neurovascular bundle appears to be intact





Labs:


Labs:





Laboratory Tests








Test


 10/20/20


19:40 10/20/20


21:43 10/21/20


00:17


 


White Blood Count


 9.1 x10^3/uL


(4.0-11.0) 


 





 


Red Blood Count


 4.38 x10^6/uL


(4.30-5.70) 


 





 


Hemoglobin


 11.5 g/dL


(13.0-17.5) 


 





 


Hematocrit


 34.3 %


(39.0-53.0) 


 





 


Mean Corpuscular Volume 78 fL ()   


 


Mean Corpuscular Hemoglobin 26 pg (25-35)   


 


Mean Corpuscular Hemoglobin


Concent 34 g/dL


(31-37) 


 





 


Red Cell Distribution Width


 20.6 %


(11.5-14.5) 


 





 


Platelet Count


 327 x10^3/uL


(140-400) 


 





 


Neutrophils (%) (Auto) 73 % (31-73)   


 


Lymphocytes (%) (Auto) 15 % (24-48)   


 


Monocytes (%) (Auto) 9 % (0-9)   


 


Eosinophils (%) (Auto) 2 % (0-3)   


 


Basophils (%) (Auto) 1 % (0-3)   


 


Neutrophils # (Auto)


 6.6 x10^3/uL


(1.8-7.7) 


 





 


Lymphocytes # (Auto)


 1.4 x10^3/uL


(1.0-4.8) 


 





 


Monocytes # (Auto)


 0.8 x10^3/uL


(0.0-1.1) 


 





 


Eosinophils # (Auto)


 0.2 x10^3/uL


(0.0-0.7) 


 





 


Basophils # (Auto)


 0.1 x10^3/uL


(0.0-0.2) 


 





 


Platelet Estimate


 Adequate


(ADEQUATE) 


 





 


Anisocytosis Slight   


 


Microcytosis Slight   


 


Prothrombin Time


 13.3 SEC


(11.7-14.0) 


 





 


Prothromb Time International


Ratio 1.1 (0.8-1.1) 


 


 





 


Activated Partial


Thromboplast Time 37 SEC (24-38) 


 


 





 


Sodium Level


 137 mmol/L


(136-145) 


 





 


Potassium Level


 4.2 mmol/L


(3.5-5.1) 


 





 


Chloride Level


 99 mmol/L


() 


 





 


Carbon Dioxide Level


 24 mmol/L


(21-32) 


 





 


Anion Gap 14 (6-14)   


 


Blood Urea Nitrogen 9 mg/dL (8-26)   


 


Creatinine


 0.9 mg/dL


(0.7-1.3) 


 





 


Estimated GFR


(Cockcroft-Gault) 86.1 


 


 





 


BUN/Creatinine Ratio 10 (6-20)   


 


Glucose Level


 90 mg/dL


(70-99) 


 





 


Lactic Acid Level


 3.4 mmol/L


(0.4-2.0) 


 3.5 mmol/L


(0.4-2.0)


 


Calcium Level


 9.7 mg/dL


(8.5-10.1) 


 





 


Magnesium Level


 2.2 mg/dL


(1.8-2.4) 


 





 


Total Bilirubin


 0.4 mg/dL


(0.2-1.0) 


 





 


Aspartate Amino Transf


(AST/SGOT) 17 U/L (15-37) 


 


 





 


Alanine Aminotransferase


(ALT/SGPT) 19 U/L (16-63) 


 


 





 


Alkaline Phosphatase


 118 U/L


() 


 





 


Troponin I Quantitative


 < 0.017 ng/mL


(0.000-0.055) 


 < 0.017 ng/mL


(0.000-0.055)


 


NT-Pro-B-Type Natriuretic


Peptide 1227 pg/mL


(0-124) 


 





 


Total Protein


 8.3 g/dL


(6.4-8.2) 


 





 


Albumin


 4.4 g/dL


(3.4-5.0) 


 





 


Albumin/Globulin Ratio 1.1 (1.0-1.7)   


 


Lipase


 176 U/L


() 


 





 


Urine Opiates Screen  Pos (NEG)  


 


Urine Methadone Screen  Neg (NEG)  


 


Urine Barbiturates  Neg (NEG)  


 


Urine Phencyclidine Screen  Neg (NEG)  


 


Urine


Amphetamine/Methamphetamine 


 Neg (NEG) 


 





 


Urine Benzodiazepines Screen  Neg (NEG)  


 


Urine Cocaine Screen  Neg (NEG)  


 


Urine Cannabinoids Screen  Neg (NEG)  


 


Urine Ethyl Alcohol  Neg (NEG)  


 


Triglycerides Level


 


 


 83 mg/dL


(0-150)


 


Cholesterol Level


 


 


 121 mg/dL


(0-200)


 


LDL Cholesterol, Calculated


 


 


 70 mg/dL


(0-100)


 


VLDL Cholesterol, Calculated


 


 


 17 mg/dL


(0-40)


 


Non-HDL Cholesterol Calculated


 


 


 87 mg/dL


(0-129)


 


HDL Cholesterol


 


 


 34 mg/dL


(40-60)


 


Cholesterol/HDL Ratio   3.6 








Laboratory Tests








Test


 10/20/20


19:40 10/20/20


21:43 10/21/20


00:17


 


White Blood Count


 9.1 x10^3/uL


(4.0-11.0) 


 





 


Red Blood Count


 4.38 x10^6/uL


(4.30-5.70) 


 





 


Hemoglobin


 11.5 g/dL


(13.0-17.5) 


 





 


Hematocrit


 34.3 %


(39.0-53.0) 


 





 


Mean Corpuscular Volume 78 fL ()   


 


Mean Corpuscular Hemoglobin 26 pg (25-35)   


 


Mean Corpuscular Hemoglobin


Concent 34 g/dL


(31-37) 


 





 


Red Cell Distribution Width


 20.6 %


(11.5-14.5) 


 





 


Platelet Count


 327 x10^3/uL


(140-400) 


 





 


Neutrophils (%) (Auto) 73 % (31-73)   


 


Lymphocytes (%) (Auto) 15 % (24-48)   


 


Monocytes (%) (Auto) 9 % (0-9)   


 


Eosinophils (%) (Auto) 2 % (0-3)   


 


Basophils (%) (Auto) 1 % (0-3)   


 


Neutrophils # (Auto)


 6.6 x10^3/uL


(1.8-7.7) 


 





 


Lymphocytes # (Auto)


 1.4 x10^3/uL


(1.0-4.8) 


 





 


Monocytes # (Auto)


 0.8 x10^3/uL


(0.0-1.1) 


 





 


Eosinophils # (Auto)


 0.2 x10^3/uL


(0.0-0.7) 


 





 


Basophils # (Auto)


 0.1 x10^3/uL


(0.0-0.2) 


 





 


Platelet Estimate


 Adequate


(ADEQUATE) 


 





 


Anisocytosis Slight   


 


Microcytosis Slight   


 


Prothrombin Time


 13.3 SEC


(11.7-14.0) 


 





 


Prothromb Time International


Ratio 1.1 (0.8-1.1) 


 


 





 


Activated Partial


Thromboplast Time 37 SEC (24-38) 


 


 





 


Sodium Level


 137 mmol/L


(136-145) 


 





 


Potassium Level


 4.2 mmol/L


(3.5-5.1) 


 





 


Chloride Level


 99 mmol/L


() 


 





 


Carbon Dioxide Level


 24 mmol/L


(21-32) 


 





 


Anion Gap 14 (6-14)   


 


Blood Urea Nitrogen 9 mg/dL (8-26)   


 


Creatinine


 0.9 mg/dL


(0.7-1.3) 


 





 


Estimated GFR


(Cockcroft-Gault) 86.1 


 


 





 


BUN/Creatinine Ratio 10 (6-20)   


 


Glucose Level


 90 mg/dL


(70-99) 


 





 


Lactic Acid Level


 3.4 mmol/L


(0.4-2.0) 


 3.5 mmol/L


(0.4-2.0)


 


Calcium Level


 9.7 mg/dL


(8.5-10.1) 


 





 


Magnesium Level


 2.2 mg/dL


(1.8-2.4) 


 





 


Total Bilirubin


 0.4 mg/dL


(0.2-1.0) 


 





 


Aspartate Amino Transf


(AST/SGOT) 17 U/L (15-37) 


 


 





 


Alanine Aminotransferase


(ALT/SGPT) 19 U/L (16-63) 


 


 





 


Alkaline Phosphatase


 118 U/L


() 


 





 


Troponin I Quantitative


 < 0.017 ng/mL


(0.000-0.055) 


 < 0.017 ng/mL


(0.000-0.055)


 


NT-Pro-B-Type Natriuretic


Peptide 1227 pg/mL


(0-124) 


 





 


Total Protein


 8.3 g/dL


(6.4-8.2) 


 





 


Albumin


 4.4 g/dL


(3.4-5.0) 


 





 


Albumin/Globulin Ratio 1.1 (1.0-1.7)   


 


Lipase


 176 U/L


() 


 





 


Urine Opiates Screen  Pos (NEG)  


 


Urine Methadone Screen  Neg (NEG)  


 


Urine Barbiturates  Neg (NEG)  


 


Urine Phencyclidine Screen  Neg (NEG)  


 


Urine


Amphetamine/Methamphetamine 


 Neg (NEG) 


 





 


Urine Benzodiazepines Screen  Neg (NEG)  


 


Urine Cocaine Screen  Neg (NEG)  


 


Urine Cannabinoids Screen  Neg (NEG)  


 


Urine Ethyl Alcohol  Neg (NEG)  


 


Triglycerides Level


 


 


 83 mg/dL


(0-150)


 


Cholesterol Level


 


 


 121 mg/dL


(0-200)


 


LDL Cholesterol, Calculated


 


 


 70 mg/dL


(0-100)


 


VLDL Cholesterol, Calculated


 


 


 17 mg/dL


(0-40)


 


Non-HDL Cholesterol Calculated


 


 


 87 mg/dL


(0-129)


 


HDL Cholesterol


 


 


 34 mg/dL


(40-60)


 


Cholesterol/HDL Ratio   3.6 











Images:


Images


IMPRESSION:


No pulmonary embolus identified within the main, lobar or segmental 


pulmonary arteries.





Assessment/Plan


Assessment/Plan


Chest pain concerning for unstable angina/NSTEMI versus STEMI














Cardiac Catheterization on 10/9/20


Conclusion


1.  Patent previously placed stents in the left anterior descending artery, 

diagonal branch and left posterior descending artery.  The obtuse marginal 

branch and nondominant right coronary artery showed 100% chronic total 

occlusions, described in prior cardiac catheterization.


2.  Severe left ventricular systolic dysfunction with ejection fraction 

estimated at 20 to 25% with 2+ mitral regurgitation.








Patient is a 60-year-old male with an extensive cardiac history who presents to 

the ED with chest pain.  Took 4 nitro and 325 mg aspirin without any relief. CTA

Chest negative. Lactic acid was elevated at 3.4.  IVF hydration given. Hx of 

recent cardiac cath as above per [facility] review. 





Due to cardiac history and elevated lactic acid, will plan for admission.





Patient was seen by cardiology


Appreciate cardiology recommendations


Continue ASA/plavix and  secondary prevention measures.Given recurrent CP,


Smoking cessation abstain from meth, follow up with his primary outpt card

iologist


Discussed important of compliance with medical therapy/followup and abstinence 

of recreational drug use


May DC  Continue HF regimen


Follow up with his [facility] cardiologist appt on Tues.





Patient was seen bedside and examined.  Patient was chest pain-free at this 

time.  Patient was very adamant about leaving immediately due to having to go 

back to the [facility] to see his  or else he would go to senior living.  [facility] 

was contacted so that transportation can be arranged for [place] back [place].  Patient states that he will follow-up with his cardiologist in [facility] on Tuesday in order to continue his outpatient evaluation for his chest

pain.  Patient expressed understanding and he was discharged from the hospital 

safely with arrange transportation.  He was also seen by the PAT team for his 

methamphetamine abuse.  Patient was not deemed suicidal and he was received all 

of the supportive material in order to help him quell his desire for 

methamphetamine. 





Smoking cessation:  Total time spent was > 12  minutes in face to face 

counseling.  Patient has agreed to consider nicotine patches/gum or to start on 

Varnicline when discharged





Justifications for Admission


Other Justification














JITENDRA GOLDEN MD                    Oct 21, 2020 08:29

## 2020-10-21 NOTE — EKG
[facility]

              [address] [place] [address]

Test Date:    2020-10-20               Test Time:    19:20:52

Pat Name:     JOB RIOS           Department:   

Patient ID:   [id number]           Room:          

Gender:       M                        Technician:   

:          1960               Requested By: ELROY NORIEGA

Order Number: [id number]           Reading MD:     

                                 Measurements

Intervals                              Axis          

Rate:         103                      P:            44

MD:           90                       QRS:          -64

QRSD:         150                      T:            103

QT:           386                                    

QTc:          508                                    

                           Interpretive Statements

SINUS TACHYCARDIA

COMPLEX(ES) WITH ABERRANT INTRAVENTRICULAR CONDUCTION

VENTRICULAR PREMATURE COMPLEX(ES)

LEFT ATRIAL ABNORMALITY

ABNORMAL LEFT AXIS DEVIATION

NON SPECIFIC INTRAVENTRICULAR BLOCK

ABNORMAL ECG

RI6.02

No previous ECG available for comparison

## 2020-10-21 NOTE — PDOC2
ECHO PACHECO APRN 10/21/20 1113:


CARDIAC CONSULT


DATE OF CONSULT


Date of Consult


DATE: 10/21/20 


TIME: 11:04





REASON FOR CONSULT


Reason for Consult:


Chest pain





REFERRING PHYSICIAN


Referring Physician:


ralph





SOURCE


Source:  Chart review, Patient





HISTORY OF PRESENT ILLNESS


HISTORY OF PRESENT ILLNESS


This is a 61 yo male admitted for complains of chest pain. This is sharp and 

pressure mid chest. No SOA and no n/v and no palpitations. He is due to see his 

new cardiologist at [facility] Tuesday next week. 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 

comes to this facility for immediate needs. He denies meth but continues to 

smoke tobacco and apparently he has been telling nurses that he needs his 

fentanyl and has been hounding staff at the desk and also wanting to go home.  

His troponin has been normal and no EKG changes. He has not had recurrence of 

his chest pain and no significant arrhythmia. He did have LHC recently with no 

intervenable lesions with patent past stents. CHF wise he is compensated with no

SOA and no peripheral edema.





PAST MEDICAL HISTORY


Past Medical History


Cardiovascular:  CAD, CHF (ICM), HTN, Hyperlipidemia, Other (VT)


Pulmonary:  Asthma


CENTRAL NERVOUS SYSTEM:  CVA


GI:  GERD


Heme/Onc:  No pertinent hx


Hepatobiliary:  No pertinent hx


Psych:  Anxiety


Musculoskeletal:  Osteoarthritis


Rheumatologic:  No pertinent hx


Infectious disease:  No pertinent hx


ENT:  No pertinent hx


Renal/:  No pertinent hx


Endocrine:  No pertinent hx


Dermatology:  No pertinent hx





PAST SURGICAL HISTORY


Past Surgical History


Pacemaker (AICD), Other (multiple PCI/stents.





FAMILY HISTORY


Family History:  Hypertension





SOCIAL HISTORY


Social History


Smoke:  <1 pack per day


ALCOHOL:  none


Drugs:  Other (hx of meth use. 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


Lives:  with Family





CURRENT MEDICATIONS


CURRENT MEDICATIONS





Current Medications








 Medications


  (Trade)  Dose


 Ordered  Sig/Gwen


 Route


 PRN Reason  Start Time


 Stop Time Status Last Admin


Dose Admin


 


 Sodium Chloride  1,000 ml @ 


 1,000 mls/hr  1X  ONCE


 IV


   10/20/20 19:30


 10/20/20 20:29 DC 10/20/20 19:57





 


 Fentanyl Citrate


  (Fentanyl 2ml


 Vial)  50 mcg  1X  ONCE


 IV


   10/20/20 19:30


 10/20/20 19:34 DC 10/20/20 20:00





 


 Iohexol


  (Omnipaque 350


 Mg/ml)  100 ml  1X  ONCE


 IV


   10/20/20 20:30


 10/20/20 20:31 DC 10/20/20 20:43





 


 Sodium Chloride  1,000 ml @ 


 1,000 mls/hr  1X  ONCE


 IV


   10/20/20 21:15


 10/20/20 22:14 DC 10/20/20 21:27





 


 Fentanyl Citrate


  (Fentanyl 2ml


 Vial)  50 mcg  PRN Q2HR  PRN


 IV


 PAIN  10/20/20 23:00


 10/21/20 09:14 DC 10/21/20 08:41














ALLERGIES


ALLERGIES:  


Coded Allergies:  


     acetaminophen (Verified  Allergy, Intermediate, 10/8/20)


     albuterol (Verified  Adverse Reaction, Intermediate, 1/22/20)


   


   Patient states "it speeds my heart up too much"


     ibuprofen (Verified  Adverse Reaction, Intermediate, Nausea and Vomiting, 

1/22/20)





ROS


Review of System


14 point ROS evaluated with pertinent positives noted per HPI





PHYSICAL EXAM


General:  Alert, Oriented X3, Cooperative, No acute distress


HEENT:  Atraumatic, Mucous membr. moist/pink


Lungs:  Clear to auscultation, Normal air movement


Heart:  Regular rate (intermittent atrial and AV pacing)


Abdomen:  Soft, No tenderness


Extremities:  No cyanosis, No edema


Skin:  No breakdown, No significant lesion


Neuro:  Normal speech, Sensation intact


Psych/Mental Status:  Mental status NL, Mood NL


MUSCULOSKELETAL:  Osteoarthritic changes both hands





VITALS/I&O


VITALS/I&O:





                                   Vital Signs








  Date Time  Temp Pulse Resp B/P (MAP) Pulse Ox O2 Delivery O2 Flow Rate FiO2


 


10/21/20 08:41     99   


 


10/21/20 07:10 97.5 86 20 105/59 (74)  Room Air  





 97.5       














                                    I & O   


 


 10/20/20 10/20/20 10/21/20





 15:00 23:00 07:00


 


Intake Total  2000 ml 


 


Balance  2000 ml 











LABS


Lab:





                                Laboratory Tests








Test


 10/20/20


19:40 10/20/20


21:43 10/21/20


00:17


 


White Blood Count


 9.1 x10^3/uL


(4.0-11.0) 


 





 


Red Blood Count


 4.38 x10^6/uL


(4.30-5.70) 


 





 


Hemoglobin


 11.5 g/dL


(13.0-17.5)  L 


 





 


Hematocrit


 34.3 %


(39.0-53.0)  L 


 





 


Mean Corpuscular Volume


 78 fL ()


L 


 





 


Mean Corpuscular Hemoglobin 26 pg (25-35)    


 


Mean Corpuscular Hemoglobin


Concent 34 g/dL


(31-37) 


 





 


Red Cell Distribution Width


 20.6 %


(11.5-14.5)  H 


 





 


Platelet Count


 327 x10^3/uL


(140-400) 


 





 


Neutrophils (%) (Auto) 73 % (31-73)    


 


Lymphocytes (%) (Auto) 15 % (24-48)  L  


 


Monocytes (%) (Auto) 9 % (0-9)    


 


Eosinophils (%) (Auto) 2 % (0-3)    


 


Basophils (%) (Auto) 1 % (0-3)    


 


Neutrophils # (Auto)


 6.6 x10^3/uL


(1.8-7.7) 


 





 


Lymphocytes # (Auto)


 1.4 x10^3/uL


(1.0-4.8) 


 





 


Monocytes # (Auto)


 0.8 x10^3/uL


(0.0-1.1) 


 





 


Eosinophils # (Auto)


 0.2 x10^3/uL


(0.0-0.7) 


 





 


Basophils # (Auto)


 0.1 x10^3/uL


(0.0-0.2) 


 





 


Platelet Estimate


 Adequate


(ADEQUATE) 


 





 


Anisocytosis Slight    


 


Microcytosis Slight    


 


Prothrombin Time


 13.3 SEC


(11.7-14.0) 


 





 


Prothrombin Time INR 1.1 (0.8-1.1)    


 


Activated Partial


Thromboplast Time 37 SEC (24-38)


 


 





 


Sodium Level


 137 mmol/L


(136-145) 


 





 


Potassium Level


 4.2 mmol/L


(3.5-5.1) 


 





 


Chloride Level


 99 mmol/L


() 


 





 


Carbon Dioxide Level


 24 mmol/L


(21-32) 


 





 


Anion Gap 14 (6-14)    


 


Blood Urea Nitrogen


 9 mg/dL (8-26)


 


 





 


Creatinine


 0.9 mg/dL


(0.7-1.3) 


 





 


Estimated GFR


(Cockcroft-Gault) 86.1  


 


 





 


BUN/Creatinine Ratio 10 (6-20)    


 


Glucose Level


 90 mg/dL


(70-99) 


 





 


Lactic Acid Level


 3.4 mmol/L


(0.4-2.0)  H 


 3.5 mmol/L


(0.4-2.0)  H


 


Calcium Level


 9.7 mg/dL


(8.5-10.1) 


 





 


Magnesium Level


 2.2 mg/dL


(1.8-2.4) 


 





 


Total Bilirubin


 0.4 mg/dL


(0.2-1.0) 


 





 


Aspartate Amino Transferase


(AST) 17 U/L (15-37)


 


 





 


Alanine Aminotransferase (ALT)


 19 U/L (16-63)


 


 





 


Alkaline Phosphatase


 118 U/L


()  H 


 





 


Troponin I Quantitative


 < 0.017 ng/mL


(0.000-0.055) 


 < 0.017 ng/mL


(0.000-0.055)


 


NT-Pro-B-Type Natriuretic


Peptide 1227 pg/mL


(0-124)  H 


 





 


Total Protein


 8.3 g/dL


(6.4-8.2)  H 


 





 


Albumin


 4.4 g/dL


(3.4-5.0) 


 





 


Albumin/Globulin Ratio 1.1 (1.0-1.7)    


 


Lipase


 176 U/L


() 


 





 


Urine Opiates Screen  Pos (NEG)   


 


Urine Methadone Screen  Neg (NEG)   


 


Urine Barbiturates  Neg (NEG)   


 


Urine Phencyclidine Screen  Neg (NEG)   


 


Urine


Amphetamine/Methamphetamine 


 Neg (NEG)  


 





 


Urine Benzodiazepines Screen  Neg (NEG)   


 


Urine Cocaine Screen  Neg (NEG)   


 


Urine Cannabinoids Screen  Neg (NEG)   


 


Urine Ethyl Alcohol  Neg (NEG)   


 


Triglycerides Level


 


 


 83 mg/dL


(0-150)


 


Cholesterol Level


 


 


 121 mg/dL


(0-200)


 


LDL Cholesterol, Calculated


 


 


 70 mg/dL


(0-100)


 


VLDL Cholesterol, Calculated


 


 


 17 mg/dL


(0-40)


 


Non-HDL Cholesterol Calculated


 


 


 87 mg/dL


(0-129)


 


HDL Cholesterol


 


 


 34 mg/dL


(40-60)  L


 


Cholesterol/HDL Ratio   3.6  





                                Laboratory Tests


10/20/20 19:40








                                Laboratory Tests


10/20/20 19:40











ECHOCARDIOGRAM


ECHOCARDIOGRAM





<Conclusion>


Left ventricle systolic function is moderately impaired. EF 30-35%.


There is severe global hypokinesis of the left ventricle.


Tissue Doppler imaging reveals moderate left ventricular diastolic dysfunction.


There is a pacemaker lead in the right ventricle.


Doppler and Color-flow revealed moderate mitral regurgitation.


Doppler and Color Flow revealed mild tricuspid regurgitation. There is moderate 

pulmonary hypertension. The PA pressure was estimated at 44 mmHg.





DATE:     07/10/20 0907





HEART CATH


HEART CATH





Conclusion


1.  Patent previously placed stents in the left anterior descending artery, 

diagonal branch and left posterior descending artery.  The obtuse marginal 

branch and nondominant right coronary artery showed 100% chronic total 

occlusions, described in prior cardiac catheterization.


2.  Severe left ventricular systolic dysfunction with ejection fraction 

estimated at 20 to 25% with 2+ mitral regurgitation.





Recommendations


Optimization of medical therapy for coronary disease and ischemic 

cardiomyopathy.





DATE:     10/09/20 1353





ASSESSMENT/PLAN


ASSESSMENT/PLAN


1.  Atypical chest pain, suspect MSK negative for PE per CTA


2.  Opioid seeking behavior: demanding for fentanyl


3.  Chronic systolic CHF; clinically compensated 


4.  ICM; s/p AICD ([employer])


5.  CAD; recent LHC, patent stents no intervenable lesions


6.  Hx of VT: not on antiarrhythmic, no arrhythmias


7.  HTN: controlled


8.  HLP: not on goal


10.  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 


11. Tobaccoism with likely COPD


12. Lactic acidosis: per PCP





Recommendations


Continue ASA/plavix and  secondary prevention measures.Given recurrent CP,


Smoking cessation abstain from meth, follow up with his primary outpt 

cardiologist


Discussed important of compliance with medical therapy/followup and abstinence 

of recreational drug use


May DC  Continue HF regimen


Follow up with his [facility] cardiologist appt on Tues





NOVA ESPINOSA MD 10/21/20 1903:


CARDIAC CONSULT


ASSESSMENT/PLAN


ASSESSMENT/PLAN


Patient seen and examined.  Agree with NP's assessment and plan.  


CP atypical and most probably musculoskeletal


MI ruled out


Chr syst HF compensated


CAD stable


Recent cath results noted above


s/p AICD stable


OK for DC - follow up as scheduled


Thank you for your consultation











ECHO PACHECO          Oct 21, 2020 11:13


NOVA ESPINOSA MD           Oct 21, 2020 19:03

## 2020-10-21 NOTE — DISCH
DISCHARGE INSTRUCTIONS


Condition on Discharge


Condition on Discharge:  Stable





Activity After Discharge


Activity Instructions for Disc:  Activity as tolerated


Bathing Instructions:  No Tub Bath until see 


Lifting Instructions after Dis:  No heavy lifting, No pulling or pushing, Do not

lift >10 pounds


Exercise Instruction after Dis:  Exercise per therapy


Driving Instructions after Dis:  Do not drive today


Weight Bearing Status after Di:  As tolerated





Diet after Discharge


Diet after Discharge:  Cardiac


Diet Texture:  Regular


Liquid Texture:  Thin Liquid


Swallowing Supervision:  None needed





Wound Incision Care


Wound/Incision Care:  No wound care needed





Checks after Discharge


Checks after discharge:  Check blood press - daily, Check your Temp as needed





Contacting the DRManjeet after DC


Call your doctor for:  If your condition worsens





Follow-Up


Follow up with:  PCP within 1 week of discharge


Follow Up With:  Cardiology as planned





Treatment/Equipment after DC


Adaptive Equipment Issued:  None











JITENDRA GOLDEN MD                    Oct 21, 2020 12:23

## 2020-10-21 NOTE — NUR
SS following for discharge planning. SS reviewed pt chart and discussed with pt RN. Pt is 
from home and is currently on room air. Cardiology signed off. Per RN, pt will discharge 
today and needs transportation through Medicaid. SS contacted [employer], [phone], and 
arranged transportation for discharge to home. Trip ID#[id number]. [facility] upon [facility] and would be here in a 3 hour window. Pt's RN notified.

## 2020-11-09 ENCOUNTER — HOSPITAL ENCOUNTER (OUTPATIENT)
Dept: HOSPITAL 61 - ER | Age: 60
Setting detail: OBSERVATION
LOS: 1 days | Discharge: HOME | End: 2020-11-10
Attending: STUDENT IN AN ORGANIZED HEALTH CARE EDUCATION/TRAINING PROGRAM | Admitting: STUDENT IN AN ORGANIZED HEALTH CARE EDUCATION/TRAINING PROGRAM
Payer: MEDICAID

## 2020-11-09 VITALS — SYSTOLIC BLOOD PRESSURE: 105 MMHG | DIASTOLIC BLOOD PRESSURE: 64 MMHG

## 2020-11-09 VITALS — DIASTOLIC BLOOD PRESSURE: 56 MMHG | SYSTOLIC BLOOD PRESSURE: 103 MMHG

## 2020-11-09 VITALS — BODY MASS INDEX: 22.39 KG/M2 | WEIGHT: 142.64 LBS | HEIGHT: 67 IN

## 2020-11-09 DIAGNOSIS — F17.200: ICD-10-CM

## 2020-11-09 DIAGNOSIS — I20.0: ICD-10-CM

## 2020-11-09 DIAGNOSIS — E78.5: ICD-10-CM

## 2020-11-09 DIAGNOSIS — M19.90: ICD-10-CM

## 2020-11-09 DIAGNOSIS — Z79.82: ICD-10-CM

## 2020-11-09 DIAGNOSIS — J98.11: ICD-10-CM

## 2020-11-09 DIAGNOSIS — Z79.02: ICD-10-CM

## 2020-11-09 DIAGNOSIS — I50.22: ICD-10-CM

## 2020-11-09 DIAGNOSIS — I44.7: ICD-10-CM

## 2020-11-09 DIAGNOSIS — R07.89: Primary | ICD-10-CM

## 2020-11-09 DIAGNOSIS — I48.91: ICD-10-CM

## 2020-11-09 DIAGNOSIS — R79.89: ICD-10-CM

## 2020-11-09 DIAGNOSIS — I11.0: ICD-10-CM

## 2020-11-09 DIAGNOSIS — Z90.81: ICD-10-CM

## 2020-11-09 DIAGNOSIS — Z95.810: ICD-10-CM

## 2020-11-09 DIAGNOSIS — R91.1: ICD-10-CM

## 2020-11-09 DIAGNOSIS — Z98.890: ICD-10-CM

## 2020-11-09 DIAGNOSIS — J45.909: ICD-10-CM

## 2020-11-09 DIAGNOSIS — J84.10: ICD-10-CM

## 2020-11-09 DIAGNOSIS — Z95.1: ICD-10-CM

## 2020-11-09 DIAGNOSIS — Z86.73: ICD-10-CM

## 2020-11-09 DIAGNOSIS — J43.2: ICD-10-CM

## 2020-11-09 DIAGNOSIS — K21.9: ICD-10-CM

## 2020-11-09 LAB
ALBUMIN SERPL-MCNC: 4.1 G/DL (ref 3.4–5)
ALBUMIN/GLOB SERPL: 1 {RATIO} (ref 1–1.7)
ALP SERPL-CCNC: 91 U/L (ref 46–116)
ALT SERPL-CCNC: 18 U/L (ref 16–63)
ANION GAP SERPL CALC-SCNC: 14 MMOL/L (ref 6–14)
APTT BLD: 32 SEC (ref 24–38)
AST SERPL-CCNC: 17 U/L (ref 15–37)
BASOPHILS # BLD AUTO: 0.1 X10^3/UL (ref 0–0.2)
BASOPHILS NFR BLD: 1 % (ref 0–3)
BILIRUB SERPL-MCNC: 0.2 MG/DL (ref 0.2–1)
BUN SERPL-MCNC: 13 MG/DL (ref 8–26)
BUN/CREAT SERPL: 13 (ref 6–20)
CALCIUM SERPL-MCNC: 9.3 MG/DL (ref 8.5–10.1)
CHLORIDE SERPL-SCNC: 100 MMOL/L (ref 98–107)
CO2 SERPL-SCNC: 25 MMOL/L (ref 21–32)
CREAT SERPL-MCNC: 1 MG/DL (ref 0.7–1.3)
EOSINOPHIL NFR BLD: 0.1 X10^3/UL (ref 0–0.7)
EOSINOPHIL NFR BLD: 1 % (ref 0–3)
ERYTHROCYTE [DISTWIDTH] IN BLOOD BY AUTOMATED COUNT: 19.2 % (ref 11.5–14.5)
GFR SERPLBLD BASED ON 1.73 SQ M-ARVRAT: 76.2 ML/MIN
GLUCOSE SERPL-MCNC: 95 MG/DL (ref 70–99)
HCT VFR BLD CALC: 33.8 % (ref 39–53)
HGB BLD-MCNC: 11.1 G/DL (ref 13–17.5)
LIPASE: 143 U/L (ref 73–393)
LYMPHOCYTES # BLD: 0.7 X10^3/UL (ref 1–4.8)
LYMPHOCYTES NFR BLD AUTO: 12 % (ref 24–48)
MAGNESIUM SERPL-MCNC: 2.3 MG/DL (ref 1.8–2.4)
MCH RBC QN AUTO: 26 PG (ref 25–35)
MCHC RBC AUTO-ENTMCNC: 33 G/DL (ref 31–37)
MCV RBC AUTO: 79 FL (ref 79–100)
MONO #: 0.4 X10^3/UL (ref 0–1.1)
MONOCYTES NFR BLD: 7 % (ref 0–9)
NEUT #: 5 X10^3/UL (ref 1.8–7.7)
NEUTROPHILS NFR BLD AUTO: 80 % (ref 31–73)
PLATELET # BLD AUTO: 297 X10^3/UL (ref 140–400)
POTASSIUM SERPL-SCNC: 4.4 MMOL/L (ref 3.5–5.1)
PROT SERPL-MCNC: 8.2 G/DL (ref 6.4–8.2)
PROTHROMBIN TIME: 12.6 SEC (ref 11.7–14)
RBC # BLD AUTO: 4.29 X10^6/UL (ref 4.3–5.7)
SODIUM SERPL-SCNC: 139 MMOL/L (ref 136–145)
WBC # BLD AUTO: 6.3 X10^3/UL (ref 4–11)

## 2020-11-09 PROCEDURE — 93005 ELECTROCARDIOGRAM TRACING: CPT

## 2020-11-09 PROCEDURE — 83690 ASSAY OF LIPASE: CPT

## 2020-11-09 PROCEDURE — 80061 LIPID PANEL: CPT

## 2020-11-09 PROCEDURE — 94640 AIRWAY INHALATION TREATMENT: CPT

## 2020-11-09 PROCEDURE — 96374 THER/PROPH/DIAG INJ IV PUSH: CPT

## 2020-11-09 PROCEDURE — 85025 COMPLETE CBC W/AUTO DIFF WBC: CPT

## 2020-11-09 PROCEDURE — 83735 ASSAY OF MAGNESIUM: CPT

## 2020-11-09 PROCEDURE — 71275 CT ANGIOGRAPHY CHEST: CPT

## 2020-11-09 PROCEDURE — 83880 ASSAY OF NATRIURETIC PEPTIDE: CPT

## 2020-11-09 PROCEDURE — 96375 TX/PRO/DX INJ NEW DRUG ADDON: CPT

## 2020-11-09 PROCEDURE — G0379 DIRECT REFER HOSPITAL OBSERV: HCPCS

## 2020-11-09 PROCEDURE — 36415 COLL VENOUS BLD VENIPUNCTURE: CPT

## 2020-11-09 PROCEDURE — 99285 EMERGENCY DEPT VISIT HI MDM: CPT

## 2020-11-09 PROCEDURE — 85610 PROTHROMBIN TIME: CPT

## 2020-11-09 PROCEDURE — 96361 HYDRATE IV INFUSION ADD-ON: CPT

## 2020-11-09 PROCEDURE — 80053 COMPREHEN METABOLIC PANEL: CPT

## 2020-11-09 PROCEDURE — 85730 THROMBOPLASTIN TIME PARTIAL: CPT

## 2020-11-09 PROCEDURE — G0378 HOSPITAL OBSERVATION PER HR: HCPCS

## 2020-11-09 PROCEDURE — 84484 ASSAY OF TROPONIN QUANT: CPT

## 2020-11-09 PROCEDURE — 96376 TX/PRO/DX INJ SAME DRUG ADON: CPT

## 2020-11-09 RX ADMIN — FENTANYL CITRATE PRN MCG: 50 INJECTION INTRAMUSCULAR; INTRAVENOUS at 23:19

## 2020-11-09 RX ADMIN — FENTANYL CITRATE PRN MCG: 50 INJECTION INTRAMUSCULAR; INTRAVENOUS at 21:08

## 2020-11-09 RX ADMIN — FENTANYL CITRATE PRN MCG: 50 INJECTION INTRAMUSCULAR; INTRAVENOUS at 19:09

## 2020-11-09 RX ADMIN — PREGABALIN SCH MG: 75 CAPSULE ORAL at 21:07

## 2020-11-09 RX ADMIN — FENTANYL CITRATE PRN MCG: 50 INJECTION INTRAMUSCULAR; INTRAVENOUS at 17:14

## 2020-11-09 NOTE — PDOC1
History and Physical


Date of Admission


Date of Admission


DATE: 11/9/20 


TIME: 15:33





Identification/Chief Complaint


Chief Complaint


Chest pain





Source


Source:  Chart review, Patient





History of Present Illness


History of Present Illness


Patient 60-year-old male with past medical history of CHF, A. fib, MI, CVA who 

presents to the ER with complaint of chest pain.  Patient reports left-sided 

chest pain, that radiates to his left shoulder, since today around noon.  He 

reports chest pain 7/10.  Patient took some nitroglycerin prior to arrival in 

the ER without relief in his symptoms.  He received a CTA chest to the ER due to

complaints of pleuritic chest pain, which was negative for PE.  He denies any 

associated shortness of breath, nausea, or vomiting.





Troponin <0.017, BNP 1,033


Chest x-ray admission showed no acute cardiopulmonary process





Past Medical History


Cardiovascular:  CAD, CHF, HTN, Hyperlipidemia, Other


Pulmonary:  Asthma


CENTRAL NERVOUS SYSTEM:  CVA


GI:  GERD


Heme/Onc:  No pertinent hx


Hepatobiliary:  No pertinent hx


Psych:  Anxiety


Musculoskeletal:  Osteoarthritis


Rheumatologic:  No pertinent hx


Infectious disease:  No pertinent hx


Renal/:  No pertinent hx


Endocrine:  No pertinent hx





Past Surgical History


Past Surgical History:  Pacemaker, Other





Family History


Family History:  Hypertension





Social History


ALCOHOL:  none


Drugs:  Crystal meth, Other





Current Problem List


Problem List


Problems


Medical Problems:


(1) Chest pain


Status: Acute  











Current Medications


Current Medications





Current Medications


Aspirin (Ally Aspirin) 325 mg 1X  ONCE PO  Last administered on 11/9/20at 

13:10;  Start 11/9/20 at 13:00;  Stop 11/9/20 at 13:01;  Status DC


Fentanyl Citrate (Fentanyl 2ml Vial) 50 mcg 1X  ONCE IV  Last administered on 

11/9/20at 13:10;  Start 11/9/20 at 12:45;  Stop 11/9/20 at 12:46;  Status DC


Sodium Chloride 1,000 ml @  1,000 mls/hr 1X  ONCE IV  Last administered on 

11/9/20at 13:09;  Start 11/9/20 at 13:00;  Stop 11/9/20 at 13:59;  Status DC


Iohexol (Omnipaque 350 Mg/ml) 90 ml 1X  ONCE IV  Last administered on 11/9/20at 

14:01;  Start 11/9/20 at 13:45;  Stop 11/9/20 at 13:46;  Status DC


Info (CONTRAST GIVEN -- Rx MONITORING) 1 each PRN DAILY  PRN MC SEE COMMENTS;  

Start 11/9/20 at 13:45;  Stop 11/11/20 at 13:44


Fentanyl Citrate (Fentanyl 2ml Vial) 50 mcg 1X  ONCE IV ;  Start 11/9/20 at 

15:15;  Stop 11/9/20 at 15:16;  Status DC


Lorazepam (Ativan Inj) 0.5 mg 1X  ONCE IVP ;  Start 11/9/20 at 15:30;  Stop 

11/9/20 at 15:31;  Status DC


Ondansetron HCl (Zofran) 4 mg PRN Q8HRS  PRN IV NAUSEA/VOMITING;  Start 11/9/20 

at 15:30;  Stop 11/10/20 at 15:29


Fentanyl Citrate (Fentanyl 2ml Vial) 50 mcg PRN Q2HRS  PRN IV PAIN;  Start 

11/9/20 at 15:30





Active Scripts


Active


Crestor (Rosuvastatin Calcium) 40 Mg Tablet 1 Tab PO DAILY


Metoprolol Tartrate 25 Mg Tablet 0.5 Tab PO BID 10 Days


Aspirin 81 Mg Tab.chew 1 Tab PO DAILY


Clopidogrel (Clopidogrel Bisulfate) 75 Mg Tablet 1 Tab PO DAILY


Tramadol Hcl 50 Mg Tablet 100 Mg PO PRN TID PRN


Reported


Crestor (Rosuvastatin Calcium) 40 Mg Tablet 40 Mg PO HS


Metoprolol Succinate ( Xl ) (Metoprolol Succinate) 25 Mg Tab.er.24h 12.5 Mg PO 

DAILY


Spiriva (Tiotropium Morrison) 18 Mcg Cap.w.dev 1 Cap IH DAILY


NITROGLYCERIN SubLingual (Nitroglycerin) 0.4 Mg Tab.subl 0.4 Mg SL PRN Q5MIN PRN


Lyrica (Pregabalin) 300 Mg Capsule 1 Cap PO BID


Plavix (Clopidogrel Bisulfate) 75 Mg Tablet 75 Mg PO DAILY


Aspir 81 (Aspirin) 81 Mg Tablet.dr 1 Tab PO DAILY





Allergies


Allergies:  


Coded Allergies:  


     acetaminophen (Verified  Allergy, Intermediate, 10/8/20)


     albuterol (Verified  Adverse Reaction, Intermediate, 1/22/20)


   


   Patient states "it speeds my heart up too much"


     ibuprofen (Verified  Adverse Reaction, Intermediate, Nausea and Vomiting, 

1/22/20)





ROS


Review of System


GENERAL:  No history of weight change, weakness or fevers.


SKIN:  No bruising, hair changes or rashes.


EYES:  No blurred, double or loss of vision.


NOSE AND THROAT:  No history of nosebleeds, hoarseness or sore throat.


HEART: Chest pain.  Denies palpitations.


LUNGS:  Denies cough, hemoptysis, wheezing or shortness of breath.


GASTROINTESTINAL: Denies nausea, vomiting, abdominal pain.


GENITOURINARY: Denies dysuria, frequency, urgency, hematuria.


NEUROLOGIC:  Denies history of numbness, tingling, tremor or weakness.


PSYCHIATRIC: Denies anxiety, denies depression.


ENDOCRINE:  No history of heat or cold intolerance, polyuria or polydipsia.


EXTREMITIES:  Denies muscle weakness, joint pain, pain on walking or stiffness.





Physical Exam


Physical Exam


General:  Alert, Oriented X3, Cooperative, No acute distress


HEENT:  PERRLA, EOMI


Lungs:  Clear to auscultation, Normal air movement


Heart:  RRR, no murmurs


Cardiovascular:  S1, S2


Abdomen:  Normal bowel sounds, Soft, No tenderness


Extremities:  No clubbing, No cyanosis


Skin:  No rashes, No significant lesion


Neuro:  Normal speech, Normal tone, Sensation intact


Psych/Mental Status:  Mental status NL, Mood NL





Vitals


Vitals





Vital Signs








  Date Time  Temp Pulse Resp B/P (MAP) Pulse Ox O2 Delivery O2 Flow Rate FiO2


 


11/9/20 12:25 98.0 91 25 125/69 (87) 98 Room Air  





 98.0       











Labs


Labs





Laboratory Tests








Test


 11/9/20


13:05


 


White Blood Count


 6.3 x10^3/uL


(4.0-11.0)


 


Red Blood Count


 4.29 x10^6/uL


(4.30-5.70)


 


Hemoglobin


 11.1 g/dL


(13.0-17.5)


 


Hematocrit


 33.8 %


(39.0-53.0)


 


Mean Corpuscular Volume 79 fL () 


 


Mean Corpuscular Hemoglobin 26 pg (25-35) 


 


Mean Corpuscular Hemoglobin


Concent 33 g/dL


(31-37)


 


Red Cell Distribution Width


 19.2 %


(11.5-14.5)


 


Platelet Count


 297 x10^3/uL


(140-400)


 


Neutrophils (%) (Auto) 80 % (31-73) 


 


Lymphocytes (%) (Auto) 12 % (24-48) 


 


Monocytes (%) (Auto) 7 % (0-9) 


 


Eosinophils (%) (Auto) 1 % (0-3) 


 


Basophils (%) (Auto) 1 % (0-3) 


 


Neutrophils # (Auto)


 5.0 x10^3/uL


(1.8-7.7)


 


Lymphocytes # (Auto)


 0.7 x10^3/uL


(1.0-4.8)


 


Monocytes # (Auto)


 0.4 x10^3/uL


(0.0-1.1)


 


Eosinophils # (Auto)


 0.1 x10^3/uL


(0.0-0.7)


 


Basophils # (Auto)


 0.1 x10^3/uL


(0.0-0.2)


 


Prothrombin Time


 12.6 SEC


(11.7-14.0)


 


Prothromb Time International


Ratio 1.0 (0.8-1.1) 





 


Activated Partial


Thromboplast Time 32 SEC (24-38) 





 


Sodium Level


 139 mmol/L


(136-145)


 


Potassium Level


 4.4 mmol/L


(3.5-5.1)


 


Chloride Level


 100 mmol/L


()


 


Carbon Dioxide Level


 25 mmol/L


(21-32)


 


Anion Gap 14 (6-14) 


 


Blood Urea Nitrogen


 13 mg/dL


(8-26)


 


Creatinine


 1.0 mg/dL


(0.7-1.3)


 


Estimated GFR


(Cockcroft-Gault) 76.2 





 


BUN/Creatinine Ratio 13 (6-20) 


 


Glucose Level


 95 mg/dL


(70-99)


 


Calcium Level


 9.3 mg/dL


(8.5-10.1)


 


Magnesium Level


 2.3 mg/dL


(1.8-2.4)


 


Total Bilirubin


 0.2 mg/dL


(0.2-1.0)


 


Aspartate Amino Transf


(AST/SGOT) 17 U/L (15-37) 





 


Alanine Aminotransferase


(ALT/SGPT) 18 U/L (16-63) 





 


Alkaline Phosphatase


 91 U/L


()


 


Troponin I Quantitative


 < 0.017 ng/mL


(0.000-0.055)


 


NT-Pro-B-Type Natriuretic


Peptide 1033 pg/mL


(0-124)


 


Total Protein


 8.2 g/dL


(6.4-8.2)


 


Albumin


 4.1 g/dL


(3.4-5.0)


 


Albumin/Globulin Ratio 1.0 (1.0-1.7) 


 


Lipase


 143 U/L


()








Laboratory Tests








Test


 11/9/20


13:05


 


White Blood Count


 6.3 x10^3/uL


(4.0-11.0)


 


Red Blood Count


 4.29 x10^6/uL


(4.30-5.70)


 


Hemoglobin


 11.1 g/dL


(13.0-17.5)


 


Hematocrit


 33.8 %


(39.0-53.0)


 


Mean Corpuscular Volume 79 fL () 


 


Mean Corpuscular Hemoglobin 26 pg (25-35) 


 


Mean Corpuscular Hemoglobin


Concent 33 g/dL


(31-37)


 


Red Cell Distribution Width


 19.2 %


(11.5-14.5)


 


Platelet Count


 297 x10^3/uL


(140-400)


 


Neutrophils (%) (Auto) 80 % (31-73) 


 


Lymphocytes (%) (Auto) 12 % (24-48) 


 


Monocytes (%) (Auto) 7 % (0-9) 


 


Eosinophils (%) (Auto) 1 % (0-3) 


 


Basophils (%) (Auto) 1 % (0-3) 


 


Neutrophils # (Auto)


 5.0 x10^3/uL


(1.8-7.7)


 


Lymphocytes # (Auto)


 0.7 x10^3/uL


(1.0-4.8)


 


Monocytes # (Auto)


 0.4 x10^3/uL


(0.0-1.1)


 


Eosinophils # (Auto)


 0.1 x10^3/uL


(0.0-0.7)


 


Basophils # (Auto)


 0.1 x10^3/uL


(0.0-0.2)


 


Prothrombin Time


 12.6 SEC


(11.7-14.0)


 


Prothromb Time International


Ratio 1.0 (0.8-1.1) 





 


Activated Partial


Thromboplast Time 32 SEC (24-38) 





 


Sodium Level


 139 mmol/L


(136-145)


 


Potassium Level


 4.4 mmol/L


(3.5-5.1)


 


Chloride Level


 100 mmol/L


()


 


Carbon Dioxide Level


 25 mmol/L


(21-32)


 


Anion Gap 14 (6-14) 


 


Blood Urea Nitrogen


 13 mg/dL


(8-26)


 


Creatinine


 1.0 mg/dL


(0.7-1.3)


 


Estimated GFR


(Cockcroft-Gault) 76.2 





 


BUN/Creatinine Ratio 13 (6-20) 


 


Glucose Level


 95 mg/dL


(70-99)


 


Calcium Level


 9.3 mg/dL


(8.5-10.1)


 


Magnesium Level


 2.3 mg/dL


(1.8-2.4)


 


Total Bilirubin


 0.2 mg/dL


(0.2-1.0)


 


Aspartate Amino Transf


(AST/SGOT) 17 U/L (15-37) 





 


Alanine Aminotransferase


(ALT/SGPT) 18 U/L (16-63) 





 


Alkaline Phosphatase


 91 U/L


()


 


Troponin I Quantitative


 < 0.017 ng/mL


(0.000-0.055)


 


NT-Pro-B-Type Natriuretic


Peptide 1033 pg/mL


(0-124)


 


Total Protein


 8.2 g/dL


(6.4-8.2)


 


Albumin


 4.1 g/dL


(3.4-5.0)


 


Albumin/Globulin Ratio 1.0 (1.0-1.7) 


 


Lipase


 143 U/L


()











Images


Images


Study: CT CHEST WITH CONTRAST - PULMONARY ANGIOGRAM


 


History: Chest pain


 


Comparison: CTA chest 10/20/2020


 


Technique:  Helical CT of the chest performed after the administration of 


90 mL Omnipaque 350  intravenous contrast and timed for angiographic 


evaluation of the pulmonary arteries per PE protocol. Coronal and sagittal


3D MIP reformations were obtained.


 


One or more of the following individualized dose reduction techniques were


utilized for this examination:  


 


1. Automated exposure control


2. Adjustment of the mA and/or kV according to patient size


3. Use of iterative reconstruction technique.


 


Findings: 


 


Pulmonary Arteries: Contrast bolus is adequate. There is no acute 


pulmonary embolism.


 


Heart/Systemic Vasculature: The heart is normal in size. There a coronary 


artery stents. Pacemaker/AICD leads are present. No pericardial effusion. 


The thoracic aorta is normal in caliber.


 


Mediastinum: No lymphadenopathy.


 


Lungs: There are few unchanged small pulmonary nodules including a 3 mm 


pulmonary nodule along the right major fissure (image 70, series 3), a 3 


mm nodule in the lateral right upper lobe (image 53, series 3), and 


calcified granuloma in the left lower lobe. Mild dependent subpleural 


reticular changes, likely atelectasis. There is mild airway wall 


thickening. Mild paraseptal and centrilobular emphysema. No pleural 


effusion.


 


Neck/Axilla/Body Wall: Thyroid gland is unremarkable. No axillary 


lymphadenopathy.


 


Upper Abdomen: Unremarkable.


 


Bones: No acute osseous abnormality.


 


IMPRESSION: 


 


1.  No acute pulmonary embolism.


2.  Mild centrilobular and paraseptal emphysema.


3.  There is a few unchanged small pulmonary nodules measuring up to 3 mm.


Optional 12 month follow-up CT could be obtained to ensure stability in a 


high risk patient, per Fleischner Society guidelines.





VTE Prophylaxis Ordered


VTE Prophylaxis Devices:  No


VTE Pharmacological Prophylaxi:  Yes





Assessment/Plan


Assessment/Plan


Chest pain


Unstable angina


Elevated BNP


Pulmonary nodules





Plan:


We will admit patient due to his cardiac risk factors.


Initial troponin undetectable at that point <0.017 continue to trend troponins.


Consults cardiology


Patient had recent heart catheter in October 2020 showing patent previously 

placed stents in the left anterior descending artery, diagonal branch and left 

posterior descending artery.  The obtuse marginal branch and nondominant right 

coronary artery showed 100% chronic total occlusions, described in prior cardiac

catheterization. Severe left ventricular systolic dysfunction with ejection 

fraction estimated at 20 to 25% with 2+ mitral regurgitation.


Discussed with cardiology, do not believe further cardiac intervention is 

warranted at this time given recent heart cath.  Discussed with patient I would 

feel more comfortable trending troponins and discharging tomorrow.  I do feel 

there is some drug-seeking behavior, but given his medical history it is not 

unreasonable to trend cardiac troponins and repeat EKG in the morning.


Patient received aspirin and fentanyl in the ED.


Morphine, nitroglycerin as needed


There are a few unchanged small pulmonary nodules measuring up to 3 mm.  Rec

ommend follow-up CT in 12 months.


FEN - Cardiac diet


PPX  - Lovenox


FULL CODE


Dispo - observation for above





Justifications for Admission


Other Justification














SG OLIVO MD             Nov 9, 2020 15:45

## 2020-11-09 NOTE — RAD
Study: CT CHEST WITH CONTRAST - PULMONARY ANGIOGRAM

 

History: Chest pain

 

Comparison: CTA chest 10/20/2020

 

Technique:  Helical CT of the chest performed after the administration of 

90 mL Omnipaque 350  intravenous contrast and timed for angiographic 

evaluation of the pulmonary arteries per PE protocol. Coronal and sagittal

3D MIP reformations were obtained.

 

One or more of the following individualized dose reduction techniques were

utilized for this examination:  

 

1. Automated exposure control

2. Adjustment of the mA and/or kV according to patient size

3. Use of iterative reconstruction technique.

 

Findings: 

 

Pulmonary Arteries: Contrast bolus is adequate. There is no acute 

pulmonary embolism.

 

Heart/Systemic Vasculature: The heart is normal in size. There a coronary 

artery stents. Pacemaker/AICD leads are present. No pericardial effusion. 

The thoracic aorta is normal in caliber.

 

Mediastinum: No lymphadenopathy.

 

Lungs: There are few unchanged small pulmonary nodules including a 3 mm 

pulmonary nodule along the right major fissure (image 70, series 3), a 3 

mm nodule in the lateral right upper lobe (image 53, series 3), and 

calcified granuloma in the left lower lobe. Mild dependent subpleural 

reticular changes, likely atelectasis. There is mild airway wall 

thickening. Mild paraseptal and centrilobular emphysema. No pleural 

effusion.

 

Neck/Axilla/Body Wall: Thyroid gland is unremarkable. No axillary 

lymphadenopathy.

 

Upper Abdomen: Unremarkable.

 

Bones: No acute osseous abnormality.

 

IMPRESSION: 

 

1.  No acute pulmonary embolism.

2.  Mild centrilobular and paraseptal emphysema.

3.  There is a few unchanged small pulmonary nodules measuring up to 3 mm.

Optional 12 month follow-up CT could be obtained to ensure stability in a 

high risk patient, per Fleischner Society guidelines.

 

Electronically signed by: Madelyn Ryan MD (11/9/2020 2:11 PM) [id number]

## 2020-11-09 NOTE — PHYS DOC
Past Medical History


Past Medical History:  Asthma, CAD, CHF, CVA, High Cholesterol, Hypertension, 

MI, Other


Additional Past Medical Histor:  VTACH, Drug abuse-Methamphetamine


Past Surgical History:  Pacemaker, Splenectomy, Other


Additional Past Surgical Histo:  12 cardiac stents, multiple cardiac 

catheterizations,WITH DEFIB


Smoking Status:  Current Every Day Smoker


Alcohol Use:  Rarely


Drug Use:  Methamphetamine





General Adult


EDM:


Chief Complaint:  CHEST PAIN





HPI:


HPI:





Patient is a 60 year old male who presents with chest pain radiating to neck, 

shoulder, and down left arm. Started at noon while he was sitting. Pt has a hx 

of left bundle branch block seen on EKG today, hx of 2 strokes, hx 4 MIs, hx 12 

stent placements. Pt has a defibrillator and pacemaker. reports pain as 5-7/10 

and sharp/ tight. Pt took Nitro at noon 3x, 5-7 mins apart with no relief of 

pain. Hx of "blood clots in lung" this past year, hx HBP, hx high cholesterol. 

reports being clammy. Reports significant fam hx of "heart problems".





Review of Systems:


Review of Systems:


Constitutional: Denies fever or chills 


Eyes: Denies redness or eye pain 


HENT: Denies nasal congestion or sore throat


Respiratory: Denies cough or shortness of breath 


Cardiovascular: Reports chest pain radiating to L arm, neck, and shoulder. 

Denies pleuritic pain.


GI: Denies abdominal pain, nausea, or vomiting


: Denies dysuria or hematuria


Musculoskeletal: Denies back pain or joint pain


Integument: Denies rash or skin lesions 


Neurologic: Denies headache, focal weakness or sensory changes





Complete systems were reviewed and found to be within normal limits, except as 

documented in this note.





Heart Score:


HEART Score for Chest Pain:  








HEART Score for Chest Pain Response (Comments) Value


 


History Moderately Suspicious 1


 


ECG Nonspecific Repolarizatio 1


 


Age >45 - < 65 1


 


Risk Factors >3 Risk Factors or Hx CAD 2


 


Troponin < Normal Limit 0


 


Total  5








Risk Factors:


Risk Factors:  Current or recent (<one month) smoker, HTN, HLP, family history 

of CAD, hx 4 MIs, 12 stents, 2 strokes.


Risk Scores:


Score 0 - 3:  2.5% MACE over next 6 weeks - Discharge Home


Score 4 - 6:  20.3% MACE over next 6 weeks - Admit for Clinical Observation


Score 7 - 10:  72.7% MACE over next 6 weeks - Early Invasive Strategies





Family History:


Family History:


"entire fam hx of heart problems" reported by patient.





Current Medications:





Current Medications








 Medications


  (Trade)  Dose


 Ordered  Sig/Gwen  Start Time


 Stop Time Status Last Admin


Dose Admin


 


 Aspirin


  (Ally Aspirin)  325 mg  1X  ONCE  11/9/20 13:00


 11/9/20 13:01   





 


 Fentanyl Citrate


  (Fentanyl 2ml


 Vial)  50 mcg  1X  ONCE  11/9/20 12:45


 11/9/20 12:46 DC  





 


 Sodium Chloride  1,000 ml @ 


 1,000 mls/hr  1X  ONCE  11/9/20 13:00


 11/9/20 13:59   














Allergies:


Allergies:





Allergies








Coded Allergies Type Severity Reaction Last Updated Verified


 


  acetaminophen Allergy Intermediate  10/8/20 Yes


 


  albuterol Adverse Reaction Intermediate  1/22/20 Yes


 


  ibuprofen Adverse Reaction Intermediate Nausea and Vomiting 1/22/20 Yes











Physical Exam:


PE:





Constitutional: Well developed, well nourished, anxious, non-toxic appearance


HENT: Normocephalic, atraumatic, lip smacking


Eyes: Conjunctiva normal, no discharge


Neck: Normal range of motion, no tenderness, supple


Lungs & Thorax:  No respiratory distress, equal chest rise and fall. Pt 

breathing heavily but reports no SOB, including no pleuritic inspiratory chest 

pain. 


Abdomen: Soft, no tenderness


Skin: Warm, no erythema, no rash. Pt reports feeling "clammy". 


Back: No tenderness, no CVA tenderness


Extremities: No tenderness, ROM intact, no edema


Neurologic: Alert and oriented X 3, normal motor function, normal sensory 

function, no focal deficits noted


Psychologic: Affect anxiousl, judgment normal





EKG:


EKG:


@1223 left bundle branch block, which pt reports having for past 9-10 years, 

sinus rhythm at 83 bpm, T wave inversions I, V5-V6.  ms, QT/ QTc 394/493 

ms.





Radiology/Procedures:


Radiology/Procedures:


PROCEDURE: CT ANGIOGRAPHY CHEST





Study: CT CHEST WITH CONTRAST - PULMONARY ANGIOGRAM


 


History: Chest pain


 


Comparison: CTA chest 10/20/2020


 


Technique:  Helical CT of the chest performed after the administration of 


90 mL Omnipaque 350  intravenous contrast and timed for angiographic 


evaluation of the pulmonary arteries per PE protocol. Coronal and sagittal


3D MIP reformations were obtained.


 


One or more of the following individualized dose reduction techniques were


utilized for this examination:  


 


1. Automated exposure control


2. Adjustment of the mA and/or kV according to patient size


3. Use of iterative reconstruction technique.


 


Findings: 


 


Pulmonary Arteries: Contrast bolus is adequate. There is no acute 


pulmonary embolism.


 


Heart/Systemic Vasculature: The heart is normal in size. There a coronary 


artery stents. Pacemaker/AICD leads are present. No pericardial effusion. 


The thoracic aorta is normal in caliber.


 


Mediastinum: No lymphadenopathy.


 


Lungs: There are few unchanged small pulmonary nodules including a 3 mm 


pulmonary nodule along the right major fissure (image 70, series 3), a 3 


mm nodule in the lateral right upper lobe (image 53, series 3), and 


calcified granuloma in the left lower lobe. Mild dependent subpleural 


reticular changes, likely atelectasis. There is mild airway wall 


thickening. Mild paraseptal and centrilobular emphysema. No pleural 


effusion.


 


Neck/Axilla/Body Wall: Thyroid gland is unremarkable. No axillary 


lymphadenopathy.


 


Upper Abdomen: Unremarkable.


 


Bones: No acute osseous abnormality.


 


IMPRESSION: 


 


1.  No acute pulmonary embolism.


2.  Mild centrilobular and paraseptal emphysema.


3.  There is a few unchanged small pulmonary nodules measuring up to 3 mm.


Optional 12 month follow-up CT could be obtained to ensure stability in a 


high risk patient, per Fleischner Society guidelines.


 


Electronically signed by: Madelyn Ryan MD (11/9/2020 2:11 PM) [id number]





Course & Med Decision Making:


Course & Med Decision Making


(See chart for details)





Patient with significant cardiac risk factors presents with report of chest 

pain.  Reports chest pain is pleuritic in nature.





Left bundle branch block on EKG, but pt reports having this for past 9-10 years.

Pt does not me Scarbossi criteria. Pt has history of "lung clots".  CTA chest 

without acute process. Initial troponin WNL.





Pain/anxiety addressed. 





HEART score 5.  





Patient requiring admission for further evaluation and treatment. Discussed with

Dr. Ribeiro (hospitalist) who is in agreement with observation admission. 

Consult placed to cardiology. Discussed findings and plan with patient, who 

acknowledges understanding and agreement.





Dragon Disclaimer:


Dragon Disclaimer:


This electronic medical record was generated, in whole or in part, using a voice

recognition dictation system.





Departure


Departure


Impression:  


   Primary Impression:  


   Chest pain


   Qualified Codes:  R07.9 - Chest pain, unspecified


Disposition:  09 ADMITTED AS INPT THIS HOSP


Admitting Physician:  HIMS (Ribeiro)


Condition:  STABLE


Referrals:  


NO PCP (PCP)











ELROY NORIEGA DO              Nov 9, 2020 13:08

## 2020-11-09 NOTE — EKG
[facility]

              [address] [place] [address]

Test Date:    2020               Test Time:    12:23:47

Pat Name:     JOB RIOS           Department:   

Patient ID:   [id number]           Room:          

Gender:       M                        Technician:   

:          1960               Requested By: ELROY NOIREGA

Order Number: [id number]           Reading MD:     

                                 Measurements

Intervals                              Axis          

Rate:         93                       P:            54

NV:           128                      QRS:          -17

QRSD:         146                      T:            -175

QT:           394                                    

QTc:          493                                    

                           Interpretive Statements

SINUS RHYTHM

POSSIBLE LEFT ATRIAL ABNORMALITY

LEFTWARD AXIS

NON SPECIFIC INTRAVENTRICULAR BLOCK

QRS(T) CONTOUR ABNORMALITY

CONSIDER ANTEROSEPTAL MYOCARDIAL DAMAGE

ABNORMAL ECG

RI6.01

No previous ECG available for comparison

## 2020-11-10 VITALS — SYSTOLIC BLOOD PRESSURE: 102 MMHG | DIASTOLIC BLOOD PRESSURE: 56 MMHG

## 2020-11-10 VITALS — SYSTOLIC BLOOD PRESSURE: 111 MMHG | DIASTOLIC BLOOD PRESSURE: 59 MMHG

## 2020-11-10 VITALS — SYSTOLIC BLOOD PRESSURE: 96 MMHG | DIASTOLIC BLOOD PRESSURE: 59 MMHG

## 2020-11-10 LAB
CHOLEST SERPL-MCNC: 135 MG/DL (ref 0–200)
CHOLEST/HDLC SERPL: 4.7 {RATIO}
HDLC SERPL-MCNC: 29 MG/DL (ref 40–60)
LDLC: 66 MG/DL (ref 0–100)
TRIGL SERPL-MCNC: 198 MG/DL (ref 0–150)
VLDLC: 40 MG/DL (ref 0–40)

## 2020-11-10 RX ADMIN — PREGABALIN SCH MG: 75 CAPSULE ORAL at 08:44

## 2020-11-10 RX ADMIN — FENTANYL CITRATE PRN MCG: 50 INJECTION INTRAMUSCULAR; INTRAVENOUS at 01:38

## 2020-11-10 RX ADMIN — FENTANYL CITRATE PRN MCG: 50 INJECTION INTRAMUSCULAR; INTRAVENOUS at 08:43

## 2020-11-10 RX ADMIN — FENTANYL CITRATE PRN MCG: 50 INJECTION INTRAMUSCULAR; INTRAVENOUS at 11:31

## 2020-11-10 RX ADMIN — FENTANYL CITRATE PRN MCG: 50 INJECTION INTRAMUSCULAR; INTRAVENOUS at 06:39

## 2020-11-10 RX ADMIN — FENTANYL CITRATE PRN MCG: 50 INJECTION INTRAMUSCULAR; INTRAVENOUS at 03:44

## 2020-11-10 NOTE — NUR
Patient arrived at [address] VIA gurney. Patient alert and orientated x4. Patient pacing and 
anxious upon admission. Patient educated on using unplugging and plugging the monitor back 
in due to patient being hard wired for the monitor. Patient refused lovenox and educated on 
the use and side effects of lovenox and patient continues to refuse. Patient refused to 
state how much cash or if any credit cards on his persons. Other valuables on or with 
patient.

## 2020-11-10 NOTE — PDOC
ECHO PACHECO APRN 11/10/20 1148:


CARDIO Progress Notes


Date and Time


Date of Service


11/10/2020


Time of Evaluation


1030





Subjective


Subjective:  No Chest Pain, No shortness of breath, No Palpitations





Vitals


Vitals





Vital Signs








  Date Time  Temp Pulse Resp B/P (MAP) Pulse Ox O2 Delivery O2 Flow Rate FiO2


 


11/10/20 11:38      Room Air  


 


11/10/20 10:38 97.6 91 18 111/59 (76) 98   





 97.6       








Weight


Weight [ ]





Input and Output


Intake and Output











Intake and Output 


 


 11/10/20





 07:00


 


Intake Total 2220 ml


 


Balance 2220 ml


 


 


 


Intake Oral 1220 ml


 


IV Total 1000 ml











Laboratory


Labs





Laboratory Tests








Test


 11/9/20


13:05 11/9/20


15:55 11/9/20


20:05 11/10/20


07:40


 


White Blood Count


 6.3 x10^3/uL


(4.0-11.0) 


 


 





 


Red Blood Count


 4.29 x10^6/uL


(4.30-5.70) 


 


 





 


Hemoglobin


 11.1 g/dL


(13.0-17.5) 


 


 





 


Hematocrit


 33.8 %


(39.0-53.0) 


 


 





 


Mean Corpuscular Volume 79 fL ()    


 


Mean Corpuscular Hemoglobin 26 pg (25-35)    


 


Mean Corpuscular Hemoglobin


Concent 33 g/dL


(31-37) 


 


 





 


Red Cell Distribution Width


 19.2 %


(11.5-14.5) 


 


 





 


Platelet Count


 297 x10^3/uL


(140-400) 


 


 





 


Neutrophils (%) (Auto) 80 % (31-73)    


 


Lymphocytes (%) (Auto) 12 % (24-48)    


 


Monocytes (%) (Auto) 7 % (0-9)    


 


Eosinophils (%) (Auto) 1 % (0-3)    


 


Basophils (%) (Auto) 1 % (0-3)    


 


Neutrophils # (Auto)


 5.0 x10^3/uL


(1.8-7.7) 


 


 





 


Lymphocytes # (Auto)


 0.7 x10^3/uL


(1.0-4.8) 


 


 





 


Monocytes # (Auto)


 0.4 x10^3/uL


(0.0-1.1) 


 


 





 


Eosinophils # (Auto)


 0.1 x10^3/uL


(0.0-0.7) 


 


 





 


Basophils # (Auto)


 0.1 x10^3/uL


(0.0-0.2) 


 


 





 


Prothrombin Time


 12.6 SEC


(11.7-14.0) 


 


 





 


Prothromb Time International


Ratio 1.0 (0.8-1.1) 


 


 


 





 


Activated Partial


Thromboplast Time 32 SEC (24-38) 


 


 


 





 


Sodium Level


 139 mmol/L


(136-145) 


 


 





 


Potassium Level


 4.4 mmol/L


(3.5-5.1) 


 


 





 


Chloride Level


 100 mmol/L


() 


 


 





 


Carbon Dioxide Level


 25 mmol/L


(21-32) 


 


 





 


Anion Gap 14 (6-14)    


 


Blood Urea Nitrogen


 13 mg/dL


(8-26) 


 


 





 


Creatinine


 1.0 mg/dL


(0.7-1.3) 


 


 





 


Estimated GFR


(Cockcroft-Gault) 76.2 


 


 


 





 


BUN/Creatinine Ratio 13 (6-20)    


 


Glucose Level


 95 mg/dL


(70-99) 


 


 





 


Calcium Level


 9.3 mg/dL


(8.5-10.1) 


 


 





 


Magnesium Level


 2.3 mg/dL


(1.8-2.4) 


 


 





 


Total Bilirubin


 0.2 mg/dL


(0.2-1.0) 


 


 





 


Aspartate Amino Transf


(AST/SGOT) 17 U/L (15-37) 


 


 


 





 


Alanine Aminotransferase


(ALT/SGPT) 18 U/L (16-63) 


 


 


 





 


Alkaline Phosphatase


 91 U/L


() 


 


 





 


Troponin I Quantitative


 < 0.017 ng/mL


(0.000-0.055) < 0.017 ng/mL


(0.000-0.055) < 0.017 ng/mL


(0.000-0.055) 





 


NT-Pro-B-Type Natriuretic


Peptide 1033 pg/mL


(0-124) 


 


 





 


Total Protein


 8.2 g/dL


(6.4-8.2) 


 


 





 


Albumin


 4.1 g/dL


(3.4-5.0) 


 


 





 


Albumin/Globulin Ratio 1.0 (1.0-1.7)    


 


Lipase


 143 U/L


() 


 


 





 


Triglycerides Level


 


 


 


 198 mg/dL


(0-150)


 


Cholesterol Level


 


 


 


 135 mg/dL


(0-200)


 


LDL Cholesterol, Calculated


 


 


 


 66 mg/dL


(0-100)


 


VLDL Cholesterol, Calculated


 


 


 


 40 mg/dL


(0-40)


 


Non-HDL Cholesterol Calculated


 


 


 


 106 mg/dL


(0-129)


 


HDL Cholesterol


 


 


 


 29 mg/dL


(40-60)


 


Cholesterol/HDL Ratio    4.7 











Physical Exam


HEENT:  Neck Supple W Full Motion


Chest:  Symmetric


LUNGS:  Clear to Auscultation


Heart:  RRR (SR)


Abdomen:  Soft N/T


Extremities:  No Calf Tenderness


Neurology:  alert, oriented, follow commands





Assessment


Assessment


HPI: This is a 59 yo male admitted for complains of chest/shoulder and arm pain.

This is shooting pain witho no associated SOA nausea or vomiting. No 

palpitations and no arrhythmias per tele. He has been asking for opioids. So far

his trops are normal. He is well known to me continues to come in despite 

extensive prior cardiac workup and asking for opioids and was recently seen here

at [facility] with about the same complain and asking for fentanyl.  He works as a 

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 

without difficulty per his reports. His discomfort is shooting discomfort with 

any significnat associated symptoms. He lives in [facility] and works 

around this area. Presently he is not in any pain or SOA. 





1.  Atypical chest pain, possible impingment syndrome


2.  Opioid seeking behavior


3.  Chronic systolic CHF; clinically compensated 


4.  ICM; s/p AICD ([employer])


5.  CAD; recent LHC, patent stents no intervenable lesions


6.  Hx of VT: not on antiarrhythmic, no arrhythmias


7.  HTN: controlled


8.  HLP: not on goal


10.  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 


11. Tobaccoism with likely COPD








Recommendations


Continue ASA/plavix and  secondary prevention measures.


Smoking cessation abstain from meth, follow up with his primary outpt 

cardiologist. Reports previous appt cancelled but will see him next week at 

[place], [place]


Discussed important of compliance with medical therapy/followup and abstinence 

of recreational drug use


May DC  Continue HF regimen





Justicifation of Admission Dx:


Justifications for Admission:


Justification of Admission Dx:  N/A


Angina:  Symp at Rest





NOVA ESPINOSA MD 11/10/20 1919:


CARDIO Progress Notes


Assessment


Assessment


Patient seen and examined.  Agree with NP's assessment and plan


CP very atypical


MI ruled out


Recent cath results as noted above


Chr systolic HF compensated


OK for DC from cardiac standpoint


Thank you for your consultation











ECHO PACHECO          Nov 10, 2020 11:48


NOVA ESPINOSA MD           Nov 10, 2020 19:19

## 2020-11-10 NOTE — NUR
Discharge Note:



[facility] [address] [place] [address]



Discharge instructions and discharge home medications reviewed with Patient and a copy 
given. All questions have been answered and understanding verbalized. 



The following instructions and handouts were given: discharge instructions, CP info, 
prescription. 



Discontinued lines and drains: Peripheral IV intact.



Patient discharged to Home or Self Care with [employer] via ambulation at 1220.

## 2020-11-10 NOTE — PDOC3
Discharge Summary


Visit Information


Date of Admission:  Nov 9, 2020


Date of Discharge:  Nov 10, 2020


Final Diagnosis


Chest pain


Unstable angina


Elevated BNP


Pulmonary nodules

















Problems


Medical Problems:


(1) Chest pain


Status: Acute  








Brief Hospital Course


Allergies





                                    Allergies








Coded Allergies Type Severity Reaction Last Updated Verified


 


  acetaminophen Allergy Intermediate  10/8/20 Yes


 


  albuterol Adverse Reaction Intermediate  1/22/20 Yes


 


  ibuprofen Adverse Reaction Intermediate Nausea and Vomiting 1/22/20 Yes








Vital Signs





Vital Signs








  Date Time  Temp Pulse Resp B/P (MAP) Pulse Ox O2 Delivery O2 Flow Rate FiO2


 


11/10/20 08:43      Room Air  


 


11/10/20 07:00 98.1 80 18 102/56 (71) 97   





 98.1       








Lab Results





Laboratory Tests








Test


 11/9/20


13:05 11/9/20


15:55 11/9/20


20:05 11/10/20


07:40


 


White Blood Count


 6.3 x10^3/uL


(4.0-11.0) 


 


 





 


Red Blood Count


 4.29 x10^6/uL


(4.30-5.70) 


 


 





 


Hemoglobin


 11.1 g/dL


(13.0-17.5) 


 


 





 


Hematocrit


 33.8 %


(39.0-53.0) 


 


 





 


Mean Corpuscular Volume 79 fL ()    


 


Mean Corpuscular Hemoglobin 26 pg (25-35)    


 


Mean Corpuscular Hemoglobin


Concent 33 g/dL


(31-37) 


 


 





 


Red Cell Distribution Width


 19.2 %


(11.5-14.5) 


 


 





 


Platelet Count


 297 x10^3/uL


(140-400) 


 


 





 


Neutrophils (%) (Auto) 80 % (31-73)    


 


Lymphocytes (%) (Auto) 12 % (24-48)    


 


Monocytes (%) (Auto) 7 % (0-9)    


 


Eosinophils (%) (Auto) 1 % (0-3)    


 


Basophils (%) (Auto) 1 % (0-3)    


 


Neutrophils # (Auto)


 5.0 x10^3/uL


(1.8-7.7) 


 


 





 


Lymphocytes # (Auto)


 0.7 x10^3/uL


(1.0-4.8) 


 


 





 


Monocytes # (Auto)


 0.4 x10^3/uL


(0.0-1.1) 


 


 





 


Eosinophils # (Auto)


 0.1 x10^3/uL


(0.0-0.7) 


 


 





 


Basophils # (Auto)


 0.1 x10^3/uL


(0.0-0.2) 


 


 





 


Prothrombin Time


 12.6 SEC


(11.7-14.0) 


 


 





 


Prothromb Time International


Ratio 1.0 (0.8-1.1) 


 


 


 





 


Activated Partial


Thromboplast Time 32 SEC (24-38) 


 


 


 





 


Sodium Level


 139 mmol/L


(136-145) 


 


 





 


Potassium Level


 4.4 mmol/L


(3.5-5.1) 


 


 





 


Chloride Level


 100 mmol/L


() 


 


 





 


Carbon Dioxide Level


 25 mmol/L


(21-32) 


 


 





 


Anion Gap 14 (6-14)    


 


Blood Urea Nitrogen


 13 mg/dL


(8-26) 


 


 





 


Creatinine


 1.0 mg/dL


(0.7-1.3) 


 


 





 


Estimated GFR


(Cockcroft-Gault) 76.2 


 


 


 





 


BUN/Creatinine Ratio 13 (6-20)    


 


Glucose Level


 95 mg/dL


(70-99) 


 


 





 


Calcium Level


 9.3 mg/dL


(8.5-10.1) 


 


 





 


Magnesium Level


 2.3 mg/dL


(1.8-2.4) 


 


 





 


Total Bilirubin


 0.2 mg/dL


(0.2-1.0) 


 


 





 


Aspartate Amino Transf


(AST/SGOT) 17 U/L (15-37) 


 


 


 





 


Alanine Aminotransferase


(ALT/SGPT) 18 U/L (16-63) 


 


 


 





 


Alkaline Phosphatase


 91 U/L


() 


 


 





 


Troponin I Quantitative


 < 0.017 ng/mL


(0.000-0.055) < 0.017 ng/mL


(0.000-0.055) < 0.017 ng/mL


(0.000-0.055) 





 


NT-Pro-B-Type Natriuretic


Peptide 1033 pg/mL


(0-124) 


 


 





 


Total Protein


 8.2 g/dL


(6.4-8.2) 


 


 





 


Albumin


 4.1 g/dL


(3.4-5.0) 


 


 





 


Albumin/Globulin Ratio 1.0 (1.0-1.7)    


 


Lipase


 143 U/L


() 


 


 





 


Triglycerides Level


 


 


 


 198 mg/dL


(0-150)


 


Cholesterol Level


 


 


 


 135 mg/dL


(0-200)


 


LDL Cholesterol, Calculated


 


 


 


 66 mg/dL


(0-100)


 


VLDL Cholesterol, Calculated


 


 


 


 40 mg/dL


(0-40)


 


Non-HDL Cholesterol Calculated


 


 


 


 106 mg/dL


(0-129)


 


HDL Cholesterol


 


 


 


 29 mg/dL


(40-60)


 


Cholesterol/HDL Ratio    4.7 








Laboratory Tests








Test


 11/9/20


13:05 11/9/20


15:55 11/9/20


20:05 11/10/20


07:40


 


White Blood Count


 6.3 x10^3/uL


(4.0-11.0) 


 


 





 


Red Blood Count


 4.29 x10^6/uL


(4.30-5.70) 


 


 





 


Hemoglobin


 11.1 g/dL


(13.0-17.5) 


 


 





 


Hematocrit


 33.8 %


(39.0-53.0) 


 


 





 


Mean Corpuscular Volume 79 fL ()    


 


Mean Corpuscular Hemoglobin 26 pg (25-35)    


 


Mean Corpuscular Hemoglobin


Concent 33 g/dL


(31-37) 


 


 





 


Red Cell Distribution Width


 19.2 %


(11.5-14.5) 


 


 





 


Platelet Count


 297 x10^3/uL


(140-400) 


 


 





 


Neutrophils (%) (Auto) 80 % (31-73)    


 


Lymphocytes (%) (Auto) 12 % (24-48)    


 


Monocytes (%) (Auto) 7 % (0-9)    


 


Eosinophils (%) (Auto) 1 % (0-3)    


 


Basophils (%) (Auto) 1 % (0-3)    


 


Neutrophils # (Auto)


 5.0 x10^3/uL


(1.8-7.7) 


 


 





 


Lymphocytes # (Auto)


 0.7 x10^3/uL


(1.0-4.8) 


 


 





 


Monocytes # (Auto)


 0.4 x10^3/uL


(0.0-1.1) 


 


 





 


Eosinophils # (Auto)


 0.1 x10^3/uL


(0.0-0.7) 


 


 





 


Basophils # (Auto)


 0.1 x10^3/uL


(0.0-0.2) 


 


 





 


Prothrombin Time


 12.6 SEC


(11.7-14.0) 


 


 





 


Prothromb Time International


Ratio 1.0 (0.8-1.1) 


 


 


 





 


Activated Partial


Thromboplast Time 32 SEC (24-38) 


 


 


 





 


Sodium Level


 139 mmol/L


(136-145) 


 


 





 


Potassium Level


 4.4 mmol/L


(3.5-5.1) 


 


 





 


Chloride Level


 100 mmol/L


() 


 


 





 


Carbon Dioxide Level


 25 mmol/L


(21-32) 


 


 





 


Anion Gap 14 (6-14)    


 


Blood Urea Nitrogen


 13 mg/dL


(8-26) 


 


 





 


Creatinine


 1.0 mg/dL


(0.7-1.3) 


 


 





 


Estimated GFR


(Cockcroft-Gault) 76.2 


 


 


 





 


BUN/Creatinine Ratio 13 (6-20)    


 


Glucose Level


 95 mg/dL


(70-99) 


 


 





 


Calcium Level


 9.3 mg/dL


(8.5-10.1) 


 


 





 


Magnesium Level


 2.3 mg/dL


(1.8-2.4) 


 


 





 


Total Bilirubin


 0.2 mg/dL


(0.2-1.0) 


 


 





 


Aspartate Amino Transf


(AST/SGOT) 17 U/L (15-37) 


 


 


 





 


Alanine Aminotransferase


(ALT/SGPT) 18 U/L (16-63) 


 


 


 





 


Alkaline Phosphatase


 91 U/L


() 


 


 





 


Troponin I Quantitative


 < 0.017 ng/mL


(0.000-0.055) < 0.017 ng/mL


(0.000-0.055) < 0.017 ng/mL


(0.000-0.055) 





 


NT-Pro-B-Type Natriuretic


Peptide 1033 pg/mL


(0-124) 


 


 





 


Total Protein


 8.2 g/dL


(6.4-8.2) 


 


 





 


Albumin


 4.1 g/dL


(3.4-5.0) 


 


 





 


Albumin/Globulin Ratio 1.0 (1.0-1.7)    


 


Lipase


 143 U/L


() 


 


 





 


Triglycerides Level


 


 


 


 198 mg/dL


(0-150)


 


Cholesterol Level


 


 


 


 135 mg/dL


(0-200)


 


LDL Cholesterol, Calculated


 


 


 


 66 mg/dL


(0-100)


 


VLDL Cholesterol, Calculated


 


 


 


 40 mg/dL


(0-40)


 


Non-HDL Cholesterol Calculated


 


 


 


 106 mg/dL


(0-129)


 


HDL Cholesterol


 


 


 


 29 mg/dL


(40-60)


 


Cholesterol/HDL Ratio    4.7 








Brief Hospital Course


Mr. Bishop  is a 60 old  admit for chest pain.





We will admit patient due to his cardiac risk factors.


Initial troponin undetectable at that point <0.017 continue to trend troponins.


Consulted cardiology


Patient had recent heart catheter in October 2020 showing patent previously 

placed stents in the left anterior descending artery, diagonal branch and left 

posterior descending artery 


he has f/u planned in [facility],    this month.





Discharge Information


Condition at Discharge:  Improved


Follow Up:  Weeks


Disposition/Orders:  D/C to Home


Scheduled


Aspirin (Aspirin) 81 Mg Tab.chew, 1 TAB PO DAILY, #10


   Prescribed by: ELROY NORIEGA D.O. on 9/2/20 0607


   Last Action: Continued on 11/9/20 1653 by SG OLIVO MD


Clopidogrel Bisulfate (Plavix) 75 Mg Tablet, 75 MG PO DAILY for TO PREVENT BLOOD

CLOTS, #30 Ref 0 (Reported)


   Entered as Reported by: Madelyn Sy on 4/17/15 2308


   Last Action: Continued on 11/9/20 1653 by SG OLIVO MD


Metoprolol Succinate (Metoprolol Succinate ( Xl )) 25 Mg Tab.er.24h, 12.5 MG PO 

DAILY for FOR HYPERTENSION, #30 Ref 0 (Reported)


   Entered as Reported by: DEBBIE SLOAN [facility] on 1/22/20 0814


   Last Action: Continued on 11/9/20 1653 by SG OLIVO MD


Pregabalin (Lyrica) 300 Mg Capsule, 1 CAP PO BID, #60 Ref 2 (Reported)


   Entered as Reported by: TOM MONTE on 6/14/16 0412


   Last Action: Converted on 11/9/20 1653 by GS OLIVO MD


Rosuvastatin Calcium (Crestor) 40 Mg Tablet, 40 MG PO HS for FOR CHOLESTEROL, 

#30 Ref 0 (Reported)


   Entered as Reported by: BARBI MANE RN on 8/7/20 1803


   Last Action: Converted on 11/9/20 1653 by SG OLIVO MD


Tiotropium Bromide (Spiriva) 18 Mcg Cap.w.dev, 1 CAP IH DAILY, #30 Ref 3 

(Reported)


   Entered as Reported by: TOM MONTE on 6/14/16 0412


   Last Action: Converted on 11/9/20 1653 by SG OLIVO MD





Scheduled PRN


Nitroglycerin (NITROGLYCERIN SubLingual) 0.4 Mg Tab.subl, 0.4 MG SL PRN Q5MIN 

PRN for CHEST PAIN, (Reported)


   Entered as Reported by: TOM MONTE on 6/14/16 0412


   Last Action: Continued on 11/9/20 1653 by SG OLIVO MD


Tramadol Hcl (Tramadol Hcl) 50 Mg Tablet, 50 MG PO PRN TID PRN for PAIN, #30


   Prescribed by: ELAYNE NUNEZ on 11/10/20 0940





Discontinued Medications


Aspirin (Aspir 81) 81 Mg Tablet.dr, 1 TAB PO DAILY, #30 Ref 5 (Reported)


   Entered as Reported by: Madelyn Sy on 4/17/15 2306


   Last Action: HELD on 11/9/20 1653 by SG OLIVO MD


Clopidogrel Bisulfate (Clopidogrel) 75 Mg Tablet, 1 TAB PO DAILY, #10


   Prescribed by: ELROY NORIEGA D.O. on 9/2/20 0607


   Last Action: HELD on 11/9/20 1653 by SG OLIVO MD


Metoprolol Tartrate (Metoprolol Tartrate) 25 Mg Tablet, 0.5 TAB PO BID for 10 

Days, #10


   Prescribed by: ELROY NORIEGA D.O. on 9/2/20 0607


   Last Action: HELD on 11/9/20 1653 by SG OLIVO MD


Rosuvastatin Calcium (Crestor) 40 Mg Tablet, 1 TAB PO DAILY, #10


   Prescribed by: ELROY NORIEGA D.O. on 9/2/20 0607


   Last Action: HELD on 11/9/20 1653 by SG OLIVO MD





Justicifation of Admission Dx:


Justifications for Admission:


Justification of Admission Dx:  N/A


Angina:  Symp at Rest











ELAYNE NUNEZ MD                 Nov 10, 2020 10:06

## 2020-11-10 NOTE — NUR
SS following for discharge planning. SS reviewed pt chart and discussed with pt RN. Pt is 
from home and is currently on room air. Discharge order on the chart for home with self 
care. Pt requesting transportation through [employer], [phone]. [facility] Trip ID# [id number]. Pt will be transported at 1345. Pt 
and pt's RN notified.

## 2020-11-20 ENCOUNTER — HOSPITAL ENCOUNTER (EMERGENCY)
Dept: HOSPITAL 61 - ER | Age: 60
Discharge: HOME | End: 2020-11-20
Payer: MEDICAID

## 2020-11-20 VITALS — BODY MASS INDEX: 23.53 KG/M2 | HEIGHT: 67 IN | WEIGHT: 149.91 LBS

## 2020-11-20 VITALS — DIASTOLIC BLOOD PRESSURE: 69 MMHG | SYSTOLIC BLOOD PRESSURE: 95 MMHG

## 2020-11-20 DIAGNOSIS — F17.200: ICD-10-CM

## 2020-11-20 DIAGNOSIS — Z95.5: ICD-10-CM

## 2020-11-20 DIAGNOSIS — I11.0: ICD-10-CM

## 2020-11-20 DIAGNOSIS — I50.9: ICD-10-CM

## 2020-11-20 DIAGNOSIS — I25.2: ICD-10-CM

## 2020-11-20 DIAGNOSIS — Z86.73: ICD-10-CM

## 2020-11-20 DIAGNOSIS — Z88.6: ICD-10-CM

## 2020-11-20 DIAGNOSIS — E78.00: ICD-10-CM

## 2020-11-20 DIAGNOSIS — J45.909: ICD-10-CM

## 2020-11-20 DIAGNOSIS — Z90.81: ICD-10-CM

## 2020-11-20 DIAGNOSIS — Z95.0: ICD-10-CM

## 2020-11-20 DIAGNOSIS — R07.89: Primary | ICD-10-CM

## 2020-11-20 DIAGNOSIS — Z88.8: ICD-10-CM

## 2020-11-20 DIAGNOSIS — I25.10: ICD-10-CM

## 2020-11-20 LAB
ALBUMIN SERPL-MCNC: 3.9 G/DL (ref 3.4–5)
ALBUMIN/GLOB SERPL: 1.1 {RATIO} (ref 1–1.7)
ALP SERPL-CCNC: 95 U/L (ref 46–116)
ALT SERPL-CCNC: 19 U/L (ref 16–63)
ANION GAP SERPL CALC-SCNC: 11 MMOL/L (ref 6–14)
AST SERPL-CCNC: 23 U/L (ref 15–37)
BASOPHILS # BLD AUTO: 0 X10^3/UL (ref 0–0.2)
BASOPHILS NFR BLD: 0 % (ref 0–3)
BILIRUB SERPL-MCNC: 0.3 MG/DL (ref 0.2–1)
BUN SERPL-MCNC: 13 MG/DL (ref 8–26)
BUN/CREAT SERPL: 16 (ref 6–20)
CALCIUM SERPL-MCNC: 9.1 MG/DL (ref 8.5–10.1)
CHLORIDE SERPL-SCNC: 102 MMOL/L (ref 98–107)
CO2 SERPL-SCNC: 24 MMOL/L (ref 21–32)
CREAT SERPL-MCNC: 0.8 MG/DL (ref 0.7–1.3)
EOSINOPHIL NFR BLD: 0.3 X10^3/UL (ref 0–0.7)
EOSINOPHIL NFR BLD: 5 % (ref 0–3)
ERYTHROCYTE [DISTWIDTH] IN BLOOD BY AUTOMATED COUNT: 17.5 % (ref 11.5–14.5)
GFR SERPLBLD BASED ON 1.73 SQ M-ARVRAT: 98.6 ML/MIN
GLUCOSE SERPL-MCNC: 81 MG/DL (ref 70–99)
HCT VFR BLD CALC: 30.2 % (ref 39–53)
HGB BLD-MCNC: 9.9 G/DL (ref 13–17.5)
LYMPHOCYTES # BLD: 1.5 X10^3/UL (ref 1–4.8)
LYMPHOCYTES NFR BLD AUTO: 27 % (ref 24–48)
MCH RBC QN AUTO: 26 PG (ref 25–35)
MCHC RBC AUTO-ENTMCNC: 33 G/DL (ref 31–37)
MCV RBC AUTO: 78 FL (ref 79–100)
MONO #: 0.7 X10^3/UL (ref 0–1.1)
MONOCYTES NFR BLD: 13 % (ref 0–9)
NEUT #: 3.1 X10^3/UL (ref 1.8–7.7)
NEUTROPHILS NFR BLD AUTO: 54 % (ref 31–73)
PLATELET # BLD AUTO: 265 X10^3/UL (ref 140–400)
POTASSIUM SERPL-SCNC: 4.4 MMOL/L (ref 3.5–5.1)
PROT SERPL-MCNC: 7.3 G/DL (ref 6.4–8.2)
RBC # BLD AUTO: 3.89 X10^6/UL (ref 4.3–5.7)
SODIUM SERPL-SCNC: 137 MMOL/L (ref 136–145)
WBC # BLD AUTO: 5.6 X10^3/UL (ref 4–11)

## 2020-11-20 PROCEDURE — 36415 COLL VENOUS BLD VENIPUNCTURE: CPT

## 2020-11-20 PROCEDURE — 84484 ASSAY OF TROPONIN QUANT: CPT

## 2020-11-20 PROCEDURE — 96374 THER/PROPH/DIAG INJ IV PUSH: CPT

## 2020-11-20 PROCEDURE — 80053 COMPREHEN METABOLIC PANEL: CPT

## 2020-11-20 PROCEDURE — 85025 COMPLETE CBC W/AUTO DIFF WBC: CPT

## 2020-11-20 PROCEDURE — 96376 TX/PRO/DX INJ SAME DRUG ADON: CPT

## 2020-11-20 PROCEDURE — 71045 X-RAY EXAM CHEST 1 VIEW: CPT

## 2020-11-20 PROCEDURE — 99285 EMERGENCY DEPT VISIT HI MDM: CPT

## 2020-11-20 NOTE — RAD
Exam: Chest one view

 

INDICATION: Chest pain

 

TECHNIQUE: Frontal view of the chest

 

Comparisons: 10/7/2020

 

FINDINGS:

The cardiomediastinal silhouette and pulmonary vessels are within normal 

limits.

 

The lung and pleural spaces are clear.

 

IMPRESSION:

No acute cardiopulmonary process.

 

Electronically signed by: Sinan Trevino MD (11/20/2020 7:33 PM) ELL[place]OTT

## 2020-11-20 NOTE — PHYS DOC
Past Medical History


Past Medical History:  Asthma, CAD, CHF, CVA, High Cholesterol, Hypertension, 

MI, Other


Additional Past Medical Histor:  VTACH, Drug abuse-Methamphetamine


Past Surgical History:  Pacemaker, Splenectomy, Other


Additional Past Surgical Histo:  12 cardiac stents, multiple cardiac 

catheterizations,WITH DEFIB


Smoking Status:  Current Every Day Smoker


Alcohol Use:  Rarely


Drug Use:  Methamphetamine





General Adult


EDM:


Chief Complaint:  CHEST PAIN





HPI:


HPI:





Patient is a 60  year old Male who presents with chest pain.





Patient states he has chest pain originating in his left chest with radiation to

his right chest and down his right side. The pain started about 1 hour before 

presentation to the ED. Patient has a history of MI's so took 3 Nitroglycerin 

and 1 325mg Aspirin with no relief of his symptoms. Patient states the pain 

varies from a 7/10 to a 9/10. He was seen at [facility] about 2 weeks ago for 

similar symptoms. The only difference this time is that he states he can feel 

his PVC's. 








Patient states he is from the leg of the [facility].  Nursing noted that patient had

a ID bracelet from another hospital.  Patient would not allow nursing to look at

the bracelet.  Patient is alsonoted to have EKG adhesive to chest and arms.  

Patient frequently asking for pain medications.





Review of Systems:


Review of Systems:


Constitutional:   Denies fever or chills. []


Eyes:   Denies change in visual acuity. []


HENT:   Denies nasal congestion or sore throat. [] 


Respiratory:   Denies cough or shortness of breath. [] 


Cardiovascular:   Denies edema. [Positive for chest pain] 


GI:   Denies abdominal pain, nausea, vomiting, bloody stools or diarrhea. [] 


:  Denies dysuria. [] 


Musculoskeletal:   Denies back pain or joint pain. [] 


Integument:   Denies rash. [] 


Neurologic:   Denies headache, focal weakness or sensory changes. [] 


Endocrine:   Denies polyuria or polydipsia. [] 


Lymphatic:  Denies swollen glands. [] 


Psychiatric:  Denies depression or anxiety. []





Heart Score:


HEART Score for Chest Pain:  








HEART Score for Chest Pain Response (Comments) Value


 


History Highly Suspicious 2


 


ECG Nonspecific Repolarizatio 1


 


Age >45 - < 65 1


 


Risk Factors >3 Risk Factors or Hx CAD 2


 


Troponin < Normal Limit 0


 


Total  6








Risk Factors:


Risk Factors:  DM, Current or recent (<one month) smoker, HTN, HLP, family 

history of CAD, obesity.


Risk Scores:


Score 0 - 3:  2.5% MACE over next 6 weeks - Discharge Home


Score 4 - 6:  20.3% MACE over next 6 weeks - Admit for Clinical Observation


Score 7 - 10:  72.7% MACE over next 6 weeks - Early Invasive Strategies





Allergies:


Allergies:





Allergies








Coded Allergies Type Severity Reaction Last Updated Verified


 


  acetaminophen Allergy Intermediate  10/8/20 Yes


 


  albuterol Adverse Reaction Intermediate  1/22/20 Yes


 


  ibuprofen Adverse Reaction Intermediate Nausea and Vomiting 1/22/20 Yes











Physical Exam:


PE:





Constitutional: Well developed, well nourished, no acute distress, non-toxic 

appearance. []


HENT: Normocephalic, atraumatic, bilateral external ears normal, oropharynx 

moist, no oral exudates, nose normal. []


Eyes: PERRLA, EOMI, conjunctiva normal, no discharge. [] 


Neck: Normal range of motion, no tenderness, supple, no stridor. [] 


Cardiovascular:Heart rate regular rhythm, no murmur []


Lungs & Thorax:  Bilateral breath sounds clear to auscultation []


Abdomen: Bowel sounds normal, soft, no tenderness, no masses, no pulsatile 

masses. [] 


Skin: Warm, dry, no erythema, no rash. [] 


Back: No tenderness, no CVA tenderness. [] 


Extremities: No tenderness, no cyanosis, no clubbing, ROM intact, no edema. [] 


Neurologic: Alert and oriented X 3, normal motor function, normal sensory 

function, no focal deficits noted. []


Psychologic: Affect normal, judgement normal, mood normal. []





EKG:


EKG:


EKG taken at 1902 heart rate 91 sinus rhythm left bundle branch block no ST 

elevation no ST depression no acute MI





Radiology/Procedures:


Radiology/Procedures:


[]





Course & Med Decision Making:


Course & Med Decision Making


Pertinent Labs and Imaging studies reviewed. (See chart for details)





[] Patient was evaluated for chief complaint.  Work-up consisted of laboratory 

analysis and radiologic imaging.  Results reviewed and discussed with patient.  

Patient does have an extensive cardiac history.  Patient EKG without acute 

ischemic changes.  Patient had a troponin which was negative x2.  Patient was 

treated with morphine for his pain.  Prior to arrival patient had aspirin.





Patient states he would like to be admitted overnight for hospitalization.





At this time given patient's EKGs and troponin I think he is stable for 

discharge.  Patient will be discharged home with instruction to follow-up with 

his primary care physician at [facility].





Dragon Disclaimer:


Dragon Disclaimer:


This electronic medical record was generated, in whole or in part, using a voice

 recognition dictation system.





Departure


Departure


Impression:  


   Primary Impression:  


   Chest pain


Disposition:  01 DC HOME SELF CARE/HOMELESS


Condition:  STABLE


Referrals:  


NO PCP (PCP)


Patient Instructions:  Chest Pain (Nonspecific)











CARMELITA WARE I DO            Nov 20, 2020 19:20

## 2021-01-05 ENCOUNTER — HOSPITAL ENCOUNTER (INPATIENT)
Dept: HOSPITAL 61 - ER | Age: 61
LOS: 2 days | Discharge: HOME | DRG: 392 | End: 2021-01-07
Attending: INTERNAL MEDICINE | Admitting: INTERNAL MEDICINE
Payer: MEDICAID

## 2021-01-05 VITALS — WEIGHT: 140.43 LBS | BODY MASS INDEX: 22.04 KG/M2 | HEIGHT: 67 IN

## 2021-01-05 VITALS — SYSTOLIC BLOOD PRESSURE: 98 MMHG | DIASTOLIC BLOOD PRESSURE: 60 MMHG

## 2021-01-05 VITALS — DIASTOLIC BLOOD PRESSURE: 66 MMHG | SYSTOLIC BLOOD PRESSURE: 102 MMHG

## 2021-01-05 DIAGNOSIS — D64.9: ICD-10-CM

## 2021-01-05 DIAGNOSIS — F17.210: ICD-10-CM

## 2021-01-05 DIAGNOSIS — E78.00: ICD-10-CM

## 2021-01-05 DIAGNOSIS — E78.5: ICD-10-CM

## 2021-01-05 DIAGNOSIS — I25.5: ICD-10-CM

## 2021-01-05 DIAGNOSIS — I50.22: ICD-10-CM

## 2021-01-05 DIAGNOSIS — Z88.8: ICD-10-CM

## 2021-01-05 DIAGNOSIS — Z95.0: ICD-10-CM

## 2021-01-05 DIAGNOSIS — I42.8: ICD-10-CM

## 2021-01-05 DIAGNOSIS — E87.6: ICD-10-CM

## 2021-01-05 DIAGNOSIS — Z90.81: ICD-10-CM

## 2021-01-05 DIAGNOSIS — Z82.49: ICD-10-CM

## 2021-01-05 DIAGNOSIS — Z79.02: ICD-10-CM

## 2021-01-05 DIAGNOSIS — Z79.82: ICD-10-CM

## 2021-01-05 DIAGNOSIS — M19.90: ICD-10-CM

## 2021-01-05 DIAGNOSIS — I25.2: ICD-10-CM

## 2021-01-05 DIAGNOSIS — F15.10: ICD-10-CM

## 2021-01-05 DIAGNOSIS — K21.9: Primary | ICD-10-CM

## 2021-01-05 DIAGNOSIS — J44.9: ICD-10-CM

## 2021-01-05 DIAGNOSIS — Z95.5: ICD-10-CM

## 2021-01-05 DIAGNOSIS — I11.0: ICD-10-CM

## 2021-01-05 DIAGNOSIS — Z88.6: ICD-10-CM

## 2021-01-05 DIAGNOSIS — F41.9: ICD-10-CM

## 2021-01-05 DIAGNOSIS — Z86.73: ICD-10-CM

## 2021-01-05 DIAGNOSIS — I25.10: ICD-10-CM

## 2021-01-05 LAB
ALBUMIN SERPL-MCNC: 3.6 G/DL (ref 3.4–5)
ALBUMIN/GLOB SERPL: 1 {RATIO} (ref 1–1.7)
ALP SERPL-CCNC: 97 U/L (ref 46–116)
ALT SERPL-CCNC: 32 U/L (ref 16–63)
ANION GAP SERPL CALC-SCNC: 12 MMOL/L (ref 6–14)
AST SERPL-CCNC: 24 U/L (ref 15–37)
BASOPHILS # BLD AUTO: 0.1 X10^3/UL (ref 0–0.2)
BASOPHILS NFR BLD: 1 % (ref 0–3)
BILIRUB SERPL-MCNC: 0.4 MG/DL (ref 0.2–1)
BUN SERPL-MCNC: 9 MG/DL (ref 8–26)
BUN/CREAT SERPL: 10 (ref 6–20)
CALCIUM SERPL-MCNC: 8.6 MG/DL (ref 8.5–10.1)
CHLORIDE SERPL-SCNC: 97 MMOL/L (ref 98–107)
CO2 SERPL-SCNC: 24 MMOL/L (ref 21–32)
CREAT SERPL-MCNC: 0.9 MG/DL (ref 0.7–1.3)
D DIMER PPP FEU-MCNC: 0.73 UG/MLFEU (ref 0–0.5)
EOSINOPHIL NFR BLD: 0.3 X10^3/UL (ref 0–0.7)
EOSINOPHIL NFR BLD: 4 % (ref 0–3)
ERYTHROCYTE [DISTWIDTH] IN BLOOD BY AUTOMATED COUNT: 16.7 % (ref 11.5–14.5)
GFR SERPLBLD BASED ON 1.73 SQ M-ARVRAT: 86.1 ML/MIN
GLUCOSE SERPL-MCNC: 102 MG/DL (ref 70–99)
HCT VFR BLD CALC: 31.8 % (ref 39–53)
HGB BLD-MCNC: 10.3 G/DL (ref 13–17.5)
LIPASE: 154 U/L (ref 73–393)
LYMPHOCYTES # BLD: 1.3 X10^3/UL (ref 1–4.8)
LYMPHOCYTES NFR BLD AUTO: 18 % (ref 24–48)
MCH RBC QN AUTO: 25 PG (ref 25–35)
MCHC RBC AUTO-ENTMCNC: 32 G/DL (ref 31–37)
MCV RBC AUTO: 76 FL (ref 79–100)
MONO #: 0.9 X10^3/UL (ref 0–1.1)
MONOCYTES NFR BLD: 13 % (ref 0–9)
NEUT #: 4.5 X10^3/UL (ref 1.8–7.7)
NEUTROPHILS NFR BLD AUTO: 64 % (ref 31–73)
PLATELET # BLD AUTO: 303 X10^3/UL (ref 140–400)
POTASSIUM SERPL-SCNC: 3.2 MMOL/L (ref 3.5–5.1)
PROT SERPL-MCNC: 7.3 G/DL (ref 6.4–8.2)
PROTHROMBIN TIME: 13.9 SEC (ref 11.7–14)
RBC # BLD AUTO: 4.16 X10^6/UL (ref 4.3–5.7)
SODIUM SERPL-SCNC: 133 MMOL/L (ref 136–145)
WBC # BLD AUTO: 7.1 X10^3/UL (ref 4–11)

## 2021-01-05 PROCEDURE — 83735 ASSAY OF MAGNESIUM: CPT

## 2021-01-05 PROCEDURE — 71045 X-RAY EXAM CHEST 1 VIEW: CPT

## 2021-01-05 PROCEDURE — 93005 ELECTROCARDIOGRAM TRACING: CPT

## 2021-01-05 PROCEDURE — 84484 ASSAY OF TROPONIN QUANT: CPT

## 2021-01-05 PROCEDURE — 85025 COMPLETE CBC W/AUTO DIFF WBC: CPT

## 2021-01-05 PROCEDURE — 96361 HYDRATE IV INFUSION ADD-ON: CPT

## 2021-01-05 PROCEDURE — 83540 ASSAY OF IRON: CPT

## 2021-01-05 PROCEDURE — 80053 COMPREHEN METABOLIC PANEL: CPT

## 2021-01-05 PROCEDURE — 83880 ASSAY OF NATRIURETIC PEPTIDE: CPT

## 2021-01-05 PROCEDURE — G0378 HOSPITAL OBSERVATION PER HR: HCPCS

## 2021-01-05 PROCEDURE — 83690 ASSAY OF LIPASE: CPT

## 2021-01-05 PROCEDURE — 85379 FIBRIN DEGRADATION QUANT: CPT

## 2021-01-05 PROCEDURE — 83550 IRON BINDING TEST: CPT

## 2021-01-05 PROCEDURE — 99285 EMERGENCY DEPT VISIT HI MDM: CPT

## 2021-01-05 PROCEDURE — 85610 PROTHROMBIN TIME: CPT

## 2021-01-05 PROCEDURE — 96374 THER/PROPH/DIAG INJ IV PUSH: CPT

## 2021-01-05 PROCEDURE — 36415 COLL VENOUS BLD VENIPUNCTURE: CPT

## 2021-01-05 PROCEDURE — 71275 CT ANGIOGRAPHY CHEST: CPT

## 2021-01-05 RX ADMIN — ATORVASTATIN CALCIUM SCH MG: 40 TABLET, FILM COATED ORAL at 21:00

## 2021-01-05 NOTE — NUR
PT REFUSES 2100 MEDS, ACTING JUMPY. RESTLESS. STATES HE WANTS TO SLEEP. STATES THAT HE IS 
BEING ASKED TOO MANY QUESTIONS. [place]

## 2021-01-05 NOTE — PHYS DOC
Past Medical History


Past Medical History:  Asthma, CAD, CHF, CVA, High Cholesterol, Hypertension, 

MI, Other


Additional Past Medical Histor:  VTACH, Drug abuse-Methamphetamine


Past Surgical History:  Pacemaker, Splenectomy, Other


Additional Past Surgical Histo:  12 cardiac stents, multiple cardiac jonel

terizations,WITH DEFIB


Smoking Status:  Current Every Day Smoker


Alcohol Use:  Rarely


Drug Use:  Methamphetamine





General Adult


EDM:


Chief Complaint:  CHEST PAIN





HPI:


HPI:





Patient is a 60  year old male who presents with chest pain that began tonight 

after he did not cut his mouth correctly and did not add enough water and the 

back of the mouth was different than usual.  He states that he thinks it 

stronger.  He states his wheeze or with chest pain.  States that is generalized 

over his chest and more over to the left.  He states is sharp.  He took 3 nitros

at home and took 325 of aspirin with no help.  Patient is very anxious and 

rocking back and forth in the ED room.  Patient began yelling at nursing staff 

and had to be given Haldol of which he stated made him feel slightly better.  

Rates his pain 8 out of 10 in his chest and states is nonradiating.  Patient has

had 12 CABGs, MI, V. tach, CVA, CAD, CHF, COPD, high cholesterol, hypertension, 

family history of cardiac events and is a smoker.





Review of Systems:


Review of Systems:


Constitutional:   Denies fever or chills. []


Eyes:   Denies change in visual acuity. []


HENT:   Denies nasal congestion or sore throat. [] 


Respiratory:   Denies cough. + shortness of breath. [] 


Cardiovascular:   +chest pain or denies edema. [] 


GI:   Denies abdominal pain, nausea, vomiting, bloody stools or diarrhea. [] 


:  Denies dysuria. [] 


Musculoskeletal:   Denies back pain or joint pain. [] 


Integument:   Denies rash. [] 


Neurologic:   Denies headache, focal weakness or sensory changes. [] 


Endocrine:   Denies polyuria or polydipsia. [] 


Lymphatic:  Denies swollen glands. [] 


Psychiatric:  Denies depression. + anxiety. + Meth abuse []





Heart Score:


HEART Score for Chest Pain:  








HEART Score for Chest Pain Response (Comments) Value


 


History Moderately Suspicious 1


 


ECG Nonspecific Repolarizatio 1


 


Age >45 - < 65 1


 


Risk Factors >3 Risk Factors or Hx CAD 2


 


Troponin < Normal Limit 0


 


Total  5








Risk Factors:


Risk Factors:  DM, Current or recent (<one month) smoker, HTN, HLP, family 

history of CAD, obesity.


Risk Scores:


Score 0 - 3:  2.5% MACE over next 6 weeks - Discharge Home


Score 4 - 6:  20.3% MACE over next 6 weeks - Admit for Clinical Observation


Score 7 - 10:  72.7% MACE over next 6 weeks - Early Invasive Strategies





Current Medications:





Current Medications








 Medications


  (Trade)  Dose


 Ordered  Sig/Gwen  Start Time


 Stop Time Status Last Admin


Dose Admin


 


 Haloperidol


 Lactate


  (Haldol Inj)  5 mg  1X  ONCE  21 17:45


 21 17:46 DC 21 17:37


5 MG











Allergies:


Allergies:





Allergies








Coded Allergies Type Severity Reaction Last Updated Verified


 


  acetaminophen Allergy Intermediate  10/8/20 Yes


 


  albuterol Adverse Reaction Intermediate  20 Yes


 


  ibuprofen Adverse Reaction Intermediate Nausea and Vomiting 20 Yes











Physical Exam:


PE:





Constitutional: Well developed, well nourished, no acute distress, non-toxic 

appearance. []


HENT: Normocephalic, atraumatic, bilateral external ears normal, oropharynx 

moist, no oral exudates, nose normal. []


Eyes: PERRLA, EOMI, conjunctiva normal, no discharge. [] 


Neck: Normal range of motion, no tenderness, supple, no stridor. [] 


Cardiovascular:Heart rate regular rhythm, no murmur []


Lungs & Thorax:  Bilateral breath sounds clear to auscultation []


Abdomen: Bowel sounds normal, soft, no tenderness, no masses, no pulsatile 

masses. [] 


Skin: Warm, dry, no erythema, no rash. [] 


Back: No tenderness, no CVA tenderness. [] 


Extremities: No tenderness, no cyanosis, no clubbing, ROM intact, no edema. [] 


Neurologic: Alert and oriented X 3, normal motor function, normal sensory 

function, no focal deficits noted. []


Psychologic: Affect normal, judgement normal, mood normal. []





Current Patient Data:


Labs:





                                Laboratory Tests








Test


 21


17:15


 


White Blood Count


 7.1 x10^3/uL


(4.0-11.0)


 


Red Blood Count


 4.16 x10^6/uL


(4.30-5.70)  L


 


Hemoglobin


 10.3 g/dL


(13.0-17.5)  L


 


Hematocrit


 31.8 %


(39.0-53.0)  L


 


Mean Corpuscular Volume


 76 fL ()


L


 


Mean Corpuscular Hemoglobin 25 pg (25-35)  


 


Mean Corpuscular Hemoglobin


Concent 32 g/dL


(31-37)


 


Red Cell Distribution Width


 16.7 %


(11.5-14.5)  H


 


Platelet Count


 303 x10^3/uL


(140-400)


 


Neutrophils (%) (Auto) 64 % (31-73)  


 


Lymphocytes (%) (Auto) 18 % (24-48)  L


 


Monocytes (%) (Auto) 13 % (0-9)  H


 


Eosinophils (%) (Auto) 4 % (0-3)  H


 


Basophils (%) (Auto) 1 % (0-3)  


 


Neutrophils # (Auto)


 4.5 x10^3/uL


(1.8-7.7)


 


Lymphocytes # (Auto)


 1.3 x10^3/uL


(1.0-4.8)


 


Monocytes # (Auto)


 0.9 x10^3/uL


(0.0-1.1)


 


Eosinophils # (Auto)


 0.3 x10^3/uL


(0.0-0.7)


 


Basophils # (Auto)


 0.1 x10^3/uL


(0.0-0.2)


 


Prothrombin Time


 13.9 SEC


(11.7-14.0)


 


Prothrombin Time INR 1.1 (0.8-1.1)  


 


D-Dimer (Camryn)


 0.73 ug/mlFEU


(0.00-0.50)  H


 


Sodium Level


 133 mmol/L


(136-145)  L


 


Potassium Level


 3.2 mmol/L


(3.5-5.1)  L


 


Chloride Level


 97 mmol/L


()  L


 


Carbon Dioxide Level


 24 mmol/L


(21-32)


 


Anion Gap 12 (6-14)  


 


Blood Urea Nitrogen


 9 mg/dL (8-26)





 


Creatinine


 0.9 mg/dL


(0.7-1.3)


 


Estimated GFR


(Cockcroft-Gault) 86.1  





 


BUN/Creatinine Ratio 10 (6-20)  


 


Glucose Level


 102 mg/dL


(70-99)  H


 


Calcium Level


 8.6 mg/dL


(8.5-10.1)


 


Total Bilirubin


 0.4 mg/dL


(0.2-1.0)


 


Aspartate Amino Transferase


(AST) 24 U/L (15-37)





 


Alanine Aminotransferase (ALT)


 32 U/L (16-63)





 


Alkaline Phosphatase


 97 U/L


()


 


Troponin I Quantitative


 0.027 ng/mL


(0.000-0.055)


 


NT-Pro-B-Type Natriuretic


Peptide 1050 pg/mL


(0-124)  H


 


Total Protein


 7.3 g/dL


(6.4-8.2)


 


Albumin


 3.6 g/dL


(3.4-5.0)


 


Albumin/Globulin Ratio 1.0 (1.0-1.7)  


 


Lipase


 154 U/L


()





                                Laboratory Tests


21 17:15








                                Laboratory Tests


21 17:15








Vital Signs:





                                   Vital Signs








  Date Time  Temp Pulse Resp B/P (MAP) Pulse Ox O2 Delivery O2 Flow Rate FiO2


 


21 16:06 98.2 120 24 127/70 (89) 99 Room Air  





 98.2       











EKG:


EKG:


EKG read by Dr. Tafoya at 1609 stated left bundle branch block, sinus tach at 

113, premature complexes.  []





Radiology/Procedures:


Radiology/Procedures:


[]


Impression:


                            [address] [address] [phone]


                                        


                                 IMAGING REPORT





                                     Signed





PATIENT: JOB RIOS   ACCOUNT: [id number]     MRN#: [id number]


: 1960           LOCATION: ER              AGE: 60


SEX: M                    EXAM DT: 21         ACCESSION#: [id number]


STATUS: REG ER            ORD. PHYSICIAN: BRISA BARRON


REASON: chest pain


PROCEDURE: PORTABLE CHEST 1V





XR CHEST 1V





CLINICAL INDICATIONS: chest pain   





COMPARISON: 2020.





Findings: No acute lung infiltrate or pleural effusion or pulmonary edema or 

lung mass or pneumothorax is seen. 3-lead pacemaker is again evident. The heart 

size, pulmonary vasculature, mediastinum and both josé are stable.





IMPRESSION: No acute radiographic abnormality is seen.





Electronically signed by: Niurka Hernandez MD (2021 5:32 PM) [id number]














DICTATED and SIGNED BY:     NIURKA HERNANDEZ MD


DATE:     21 0520NET7 0





                            [address] [place] [address]


                                 [phone]


                                        


                                 IMAGING REPORT





                                     Signed





PATIENT: JOB RIOS   ACCOUNT: [id number]     MRN#: [id number]


: 1960           LOCATION: ER              AGE: 60


SEX: M                    EXAM DT: 21         ACCESSION#: [id number]


STATUS: REG ER            ORD. PHYSICIAN: BRISA BARRON


REASON: CHEST PAIN


PROCEDURE: CT ANGIOGRAPHY CHEST








CTA Chest with contrast:





Clinical History: Reason: CHEST PAIN / Spl. Instructions: [id number] 100ML / 

History:  Shortness of breath.





Axial helical images of the chest were obtained after the administration of 100 

cc of IV Omni 350 and timed appropriately for a pulmonary arterial study.  

Conventional axial reconstruction was performed in addition to coronal, sagittal

 and bilateral oblique MIP (maximum intensity projection).  This study was 

ordered to detect possible pulmonary embolism.





There are no filling defects to suggest pulmonary embolism. 


The more peripheral subsegmental pulmonary arteries are not well opacified 

limiting our sensitivity for small peripheral pulmonary emboli. 





The lungs and pleural margins are clear.  


There is no mediastinal or hilar lymphadenopathy.





The thoracic aorta appears normal.





Impression:





1.  No evidence of pulmonary embolism.


2.  No significant findings.





PQRS Compliance Statement:





One or more of the following individualized dose reduction techniques were 

utilized for this examination:  


1. Automated exposure control  


2. Adjustment of the mA and/or kV according to patient size  


3. Use of iterative reconstruction technique





Electronically signed by: Biju Claudio III, MD (2021 6:33 PM) [facility]














DICTATED and SIGNED BY:     BIJU CLAUDIO III, MD


DATE:     21 [id number] 0





Course & Med Decision Making:


Course & Med Decision Making


Pertinent Labs and Imaging studies reviewed. (See chart for details)





See HPI.  Alert and oriented x4.  Very anxious.  Speaks in full complete 

sentences.  Ambulatory with steady gait.  Lungs are clear to auscultation all 

lobes.  Patient states he has dry mouth.  Heart score is 5.  Chest x-ray is 

normal.  Troponin is normal.  D-dimer is elevated.  Have ordered a CT angio 

chest.  Due to patient's history and his chest pain he will be here for a chest 

pain observation admission.


[]





Dragon Disclaimer:


Dragon Disclaimer:


This electronic medical record was generated, in whole or in part, using a voice

 recognition dictation system.





Departure


Departure


Impression:  


   Primary Impression:  


   Chest pain


   Qualified Codes:  R07.9 - Chest pain, unspecified


   Additional Impression:  


   Methamphetamine abuse


Disposition:   ADMITTED AS INPT THIS HOSP


Admitting Physician:  HIMS


Condition:  STABLE


Referrals:  


NO PCP (PCP)











BRISA BARRON             2021 18:13

## 2021-01-05 NOTE — NUR
The patient, JOB RIOS, 59 y/o, M admitted by FELICITY WHITLOCK MD, was given 
written information regarding hospital policies, unit procedures and contact persons.  PT 
TOO SLEEPY TO ANSWER ANY MORE QUESTIONS OR TO DISCUSS HIS MEDS. ETATES HE WANTS TO SLEEP. 
HASNT SLET IN 5 DAYS HE SAID.LCRN



Valuables were checked and .

## 2021-01-05 NOTE — RAD
XR CHEST 1V



CLINICAL INDICATIONS: chest pain   



COMPARISON: November 20, 2020.



Findings: No acute lung infiltrate or pleural effusion or pulmonary edema or lung mass or pneumothora
x is seen. 3-lead pacemaker is again evident. The heart size, pulmonary vasculature, mediastinum and 
both josé are stable.



IMPRESSION: No acute radiographic abnormality is seen.



Electronically signed by: Jr Hernandez MD (1/5/2021 5:32 PM) [id number]

## 2021-01-05 NOTE — PDOC1
History and Physical


Date of Admission


Date of Admission


DATE: 1/5/21 


TIME: 18:39





Identification/Chief Complaint


Chief Complaint


Chest pain





Source


Source:  Patient





History of Present Illness


History of Present Illness


Mr Bishop is a 59yo M w/ PMHx CAD x multiple GENNY, CHF, HTN, HLD, VT s/p AICD, 

COPD/asthma, GERD, OA, anxiety, IV methamphetamine abuse c/o chest pain with 

radiation to left arm and jaw in the evening prior to admission after he feels 

he did not properly "cut" his methamphetine with water prior to injection. He 

began having wheezing and chest pain. Pain is sharp, notes it did not remit 

after 3x NTG at home and ASA.  He rates his symptoms at 9/10.  He keeps asking 

for pain meds, became very agitated with staff, calmed down after iv haldol 

administration.


EKG with lateral TWI and leftward axis, no STEMI. CXR with no acute abnormality


Labs with WBC 7.1, Hb 10.3 with MCV 76, platelets 303, Na 133, K 3.2, BUN 9, Cr 

0.9, glucose 102, BNP 1050, Troponin 0.027. D dimer 0.73.


He underwent CTPA which was negative for pulmonary embolism.


Chest radiograph


Historically with multiple PCIs- [facility] Jan 2018 showed showed patent

mid LAD stent, patent 1st diagonal stent, total chronic occlusion of first 

obtuse marginal with territory infarct but no indication for PCI, patent stent 

in left AV groove artery, patent stent in LPDA, non-dominant 100 % stenosis of p

roximal RCA. Seen by cardiology here in January.


Admit for further work-up of his coronary artery disease





Past Medical History


Cardiovascular:  CAD, CHF, HTN, Hyperlipidemia, Other


Pulmonary:  Asthma


CENTRAL NERVOUS SYSTEM:  CVA


GI:  GERD


Heme/Onc:  No pertinent hx


Hepatobiliary:  No pertinent hx


Psych:  Anxiety


Musculoskeletal:  Osteoarthritis


Rheumatologic:  No pertinent hx


Infectious disease:  No pertinent hx


Renal/:  No pertinent hx


Endocrine:  No pertinent hx





Past Surgical History


Past Surgical History:  Pacemaker, Other





Family History


Family History:  Hypertension





Social History


Smoke:  1 pack per day


ALCOHOL:  none


Drugs:  Crystal meth, Other





Current Problem List


Problem List


Problems


Medical Problems:


(1) Chest pain


Status: Acute  











Current Medications


Current Medications





Current Medications


Haloperidol Lactate (Haldol Inj) 5 mg 1X  ONCE IVP  Last administered on 

1/5/21at 17:37;  Start 1/5/21 at 17:45;  Stop 1/5/21 at 17:46;  Status DC


Sodium Chloride 1,000 ml @  1,000 mls/hr 1X  ONCE IV ;  Start 1/5/21 at 18:15;  

Stop 1/5/21 at 19:14


Iohexol (Omnipaque 350 Mg/ml) 100 ml 1X  ONCE IV  Last administered on 1/5/21at 

18:23;  Start 1/5/21 at 18:30;  Stop 1/5/21 at 18:31;  Status DC


Info (CONTRAST GIVEN -- Rx MONITORING) 1 each PRN DAILY  PRN MC SEE COMMENTS;  

Start 1/5/21 at 18:30;  Stop 1/7/21 at 18:29





Active Scripts


Active


Tramadol Hcl 50 Mg Tablet 50 Mg PO PRN TID PRN


Aspirin 81 Mg Tab.chew 1 Tab PO DAILY


Reported


Crestor (Rosuvastatin Calcium) 40 Mg Tablet 40 Mg PO HS


Metoprolol Succinate ( Xl ) (Metoprolol Succinate) 25 Mg Tab.er.24h 12.5 Mg PO 

DAILY


Spiriva (Tiotropium [place]) 18 Mcg Cap.w.dev 1 Cap IH DAILY


NITROGLYCERIN SubLingual (Nitroglycerin) 0.4 Mg Tab.subl 0.4 Mg SL PRN Q5MIN PRN


Lyrica (Pregabalin) 300 Mg Capsule 1 Cap PO BID


Plavix (Clopidogrel Bisulfate) 75 Mg Tablet 75 Mg PO DAILY





Allergies


Allergies:  


Coded Allergies:  


     acetaminophen (Verified  Allergy, Intermediate, 10/8/20)


     albuterol (Verified  Adverse Reaction, Intermediate, 1/22/20)


   Patient states "it speeds my heart up too much"


     ibuprofen (Verified  Adverse Reaction, Intermediate, Nausea and Vomiting, 

1/22/20)





ROS


General:  YES: Fatigue, Malaise, Appetite; 


   No: Chills, Night Sweats, Other


PSYCHOLOGICAL ROS:  YES: Anxiety, Behavioral Disorder, Hallucinations, Hostility

, Irritablity, Obsessive thoughts; 


   No: Concentration difficultie, Decreased libido, Depression, Disorientation, 

Memory difficulties, Mood Swings, Physical abuse, Sexual abuse, Sleep 

disturbances, Suicidal ideation, Other


Eyes:  No Blurry vision, No Decreased vision, No Double vision, No Dry eyes, No 

Excessive tearing, No Eye Pain, No Itchy Eyes, No Loss of vision, No 

Photophobia, No Scotomata, No Uses contacts, No Uses glasses, No Other


HEENT:  No: Heacaches, Visual Changes, Hearing change, Nasal congestion, Nasal 

discharge, Oral lesions, Sinus pain, Sore Throat, Epistaxis, Sneezing, Snoring, 

Tinnitus, Vertigo, Vocal changes, Other


ALLERGY AND IMMUNOLOGY:  No: Hives, Insect Bite Sensitivity, Itchy/Watery Eyes, 

Nasal Congestion, Post Nasal Drip, Seasonal Allergies, Other


Hematological and Lymphatic:  No: Bleeding Problems, Blood Clots, Blood 

Transfusions, Brusing, Night Sweats, Pallor, Swollen Lymph Nodes, Other


ENDOCRINE:  No: Breast Changes, Galactorrhea, Hair Pattern Changes, Hot Flashes,

Malaise/lethargy, Mood Swings, Palpitations, Polydipsia/polyuria, Skin Changes, 

Temperature Intolerance, Unexpected Weight Changes, Other


Breast:  No New/Changing Breast Lumps, No Nipple changes, No Nipple discharge, 

No Other


Respiratory:  YES: Shortness of breath; 


   No: Cough, Hemoptysis, Orthopnea, Pleuritic Pain, SOB with excertion, Sputum 

Changes, Stridor, Tachypnea, Wheezing, Other


Cardiovascular:  yes Chest Pain; 


   No Palpitations, No Orthopnea, No Paroxysmal Noc. Dyspnea, No Edema, No Lt 

Headedness, No Other


Gastrointestinal:  No Nausea, No Vomiting, No Abdominal Pain, No Diarrhea, No 

Constipation, No Melena, No Hematochezia, No Other


Genitourinary:  No Dysuria, No Frequency, No Incontinence, No Hematuria, No 

Retention, No Discharge, No Urgency, No Pain, No Flank Pain, No Other, No , No ,

No , No , No , No , No 


Musculoskeletal:  No Gait Disturbance, No Joint Pain, No Joint Stiffness, No 

Joint Swelling, No Muscle Pain, No Muscular Weakness, No Pain In:, No Swelling 

In:, No Other


Neurological:  No Behavorial Changes, No Bowel/Bladder ControlChng, No 

Confusion, No Dizziness, No Gait Disturbance, No Headaches, No Impaired 

Coord/balance, No Memory Loss, No Numbness/Tingling, No Seizures, No Speech 

Problems, No Tremors, No Visual Changes, No Weakness, No Other


Skin:  No Dry Skin, No Eczema, No Hair Changes, No Lumps, No Mole Changes, No 

Mottling, No Nail Changes, No Pruritus, No Rash, No Skin Lesion Changes, No 

Other, No Acne





Physical Exam


General:  Alert, mild distress


HEENT:  Atraumatic, PERRLA, EOMI, Mucous membr. moist/pink


Lungs:  Clear to auscultation, Normal air movement


Heart:  S1S2, RRR, no thrills, no rubs


Abdomen:  Normal bowel sounds, Soft, No tenderness, No hepatosplenomegaly, No 

masses


Extremities:  No clubbing, No cyanosis, No edema, Normal pulses, No 

tenderness/swelling


Skin:  No rashes, No breakdown, No significant lesion


Neuro:  Normal speech, Strength at 5/5 X4 ext, Normal tone, Sensation intact, 

Cranial nerves 3-12 NL, Reflexes 2+


Psych/Mental Status:  Other (Agitated, confused)





Vitals


Vitals





Vital Signs








  Date Time  Temp Pulse Resp B/P (MAP) Pulse Ox O2 Delivery O2 Flow Rate FiO2


 


1/5/21 16:06 98.2 120 24 127/70 (89) 99 Room Air  





 98.2       











Labs


Labs





Laboratory Tests








Test


 1/5/21


17:15


 


White Blood Count


 7.1 x10^3/uL


(4.0-11.0)


 


Red Blood Count


 4.16 x10^6/uL


(4.30-5.70)


 


Hemoglobin


 10.3 g/dL


(13.0-17.5)


 


Hematocrit


 31.8 %


(39.0-53.0)


 


Mean Corpuscular Volume 76 fL () 


 


Mean Corpuscular Hemoglobin 25 pg (25-35) 


 


Mean Corpuscular Hemoglobin


Concent 32 g/dL


(31-37)


 


Red Cell Distribution Width


 16.7 %


(11.5-14.5)


 


Platelet Count


 303 x10^3/uL


(140-400)


 


Neutrophils (%) (Auto) 64 % (31-73) 


 


Lymphocytes (%) (Auto) 18 % (24-48) 


 


Monocytes (%) (Auto) 13 % (0-9) 


 


Eosinophils (%) (Auto) 4 % (0-3) 


 


Basophils (%) (Auto) 1 % (0-3) 


 


Neutrophils # (Auto)


 4.5 x10^3/uL


(1.8-7.7)


 


Lymphocytes # (Auto)


 1.3 x10^3/uL


(1.0-4.8)


 


Monocytes # (Auto)


 0.9 x10^3/uL


(0.0-1.1)


 


Eosinophils # (Auto)


 0.3 x10^3/uL


(0.0-0.7)


 


Basophils # (Auto)


 0.1 x10^3/uL


(0.0-0.2)


 


Prothrombin Time


 13.9 SEC


(11.7-14.0)


 


Prothromb Time International


Ratio 1.1 (0.8-1.1) 





 


D-Dimer ([place])


 0.73 ug/mlFEU


(0.00-0.50)


 


Sodium Level


 133 mmol/L


(136-145)


 


Potassium Level


 3.2 mmol/L


(3.5-5.1)


 


Chloride Level


 97 mmol/L


()


 


Carbon Dioxide Level


 24 mmol/L


(21-32)


 


Anion Gap 12 (6-14) 


 


Blood Urea Nitrogen 9 mg/dL (8-26) 


 


Creatinine


 0.9 mg/dL


(0.7-1.3)


 


Estimated GFR


(Cockcroft-Gault) 86.1 





 


BUN/Creatinine Ratio 10 (6-20) 


 


Glucose Level


 102 mg/dL


(70-99)


 


Calcium Level


 8.6 mg/dL


(8.5-10.1)


 


Total Bilirubin


 0.4 mg/dL


(0.2-1.0)


 


Aspartate Amino Transf


(AST/SGOT) 24 U/L (15-37) 





 


Alanine Aminotransferase


(ALT/SGPT) 32 U/L (16-63) 





 


Alkaline Phosphatase


 97 U/L


()


 


Troponin I Quantitative


 0.027 ng/mL


(0.000-0.055)


 


NT-Pro-B-Type Natriuretic


Peptide 1050 pg/mL


(0-124)


 


Total Protein


 7.3 g/dL


(6.4-8.2)


 


Albumin


 3.6 g/dL


(3.4-5.0)


 


Albumin/Globulin Ratio 1.0 (1.0-1.7) 


 


Lipase


 154 U/L


()








Laboratory Tests








Test


 1/5/21


17:15


 


White Blood Count


 7.1 x10^3/uL


(4.0-11.0)


 


Red Blood Count


 4.16 x10^6/uL


(4.30-5.70)


 


Hemoglobin


 10.3 g/dL


(13.0-17.5)


 


Hematocrit


 31.8 %


(39.0-53.0)


 


Mean Corpuscular Volume 76 fL () 


 


Mean Corpuscular Hemoglobin 25 pg (25-35) 


 


Mean Corpuscular Hemoglobin


Concent 32 g/dL


(31-37)


 


Red Cell Distribution Width


 16.7 %


(11.5-14.5)


 


Platelet Count


 303 x10^3/uL


(140-400)


 


Neutrophils (%) (Auto) 64 % (31-73) 


 


Lymphocytes (%) (Auto) 18 % (24-48) 


 


Monocytes (%) (Auto) 13 % (0-9) 


 


Eosinophils (%) (Auto) 4 % (0-3) 


 


Basophils (%) (Auto) 1 % (0-3) 


 


Neutrophils # (Auto)


 4.5 x10^3/uL


(1.8-7.7)


 


Lymphocytes # (Auto)


 1.3 x10^3/uL


(1.0-4.8)


 


Monocytes # (Auto)


 0.9 x10^3/uL


(0.0-1.1)


 


Eosinophils # (Auto)


 0.3 x10^3/uL


(0.0-0.7)


 


Basophils # (Auto)


 0.1 x10^3/uL


(0.0-0.2)


 


Prothrombin Time


 13.9 SEC


(11.7-14.0)


 


Prothromb Time International


Ratio 1.1 (0.8-1.1) 





 


D-Dimer ([place])


 0.73 ug/mlFEU


(0.00-0.50)


 


Sodium Level


 133 mmol/L


(136-145)


 


Potassium Level


 3.2 mmol/L


(3.5-5.1)


 


Chloride Level


 97 mmol/L


()


 


Carbon Dioxide Level


 24 mmol/L


(21-32)


 


Anion Gap 12 (6-14) 


 


Blood Urea Nitrogen 9 mg/dL (8-26) 


 


Creatinine


 0.9 mg/dL


(0.7-1.3)


 


Estimated GFR


(Cockcroft-Gault) 86.1 





 


BUN/Creatinine Ratio 10 (6-20) 


 


Glucose Level


 102 mg/dL


(70-99)


 


Calcium Level


 8.6 mg/dL


(8.5-10.1)


 


Total Bilirubin


 0.4 mg/dL


(0.2-1.0)


 


Aspartate Amino Transf


(AST/SGOT) 24 U/L (15-37) 





 


Alanine Aminotransferase


(ALT/SGPT) 32 U/L (16-63) 





 


Alkaline Phosphatase


 97 U/L


()


 


Troponin I Quantitative


 0.027 ng/mL


(0.000-0.055)


 


NT-Pro-B-Type Natriuretic


Peptide 1050 pg/mL


(0-124)


 


Total Protein


 7.3 g/dL


(6.4-8.2)


 


Albumin


 3.6 g/dL


(3.4-5.0)


 


Albumin/Globulin Ratio 1.0 (1.0-1.7) 


 


Lipase


 154 U/L


()











Images


Images


Chest Radiograph:


No acute lung infiltrate or pleural effusion or pulmonary edema or lung mass or 

pneumothorax is seen. 3-lead pacemaker is again evident. The heart size, 

pulmonary vasculature, mediastinum and both josé are stable.





IMPRESSION: No acute radiographic abnormality is seen.





CTPA:


There are no filling defects to suggest pulmonary embolism. 


The more peripheral subsegmental pulmonary arteries are not well opacified 

limiting our sensitivity for small peripheral pulmonary emboli. 





The lungs and pleural margins are clear.  


There is no mediastinal or hilar lymphadenopathy.





The thoracic aorta appears normal.





Impression:





1.  No evidence of pulmonary embolism.


2.  No significant findings.





VTE Prophylaxis Ordered


VTE Prophylaxis Devices:  Yes


VTE Pharmacological Prophylaxi:  Yes





Assessment/Plan


Assessment/Plan


A/P:


Chest pain - high risk ACS given his history and lateral TWI, will trend 

troponins, telemetry, consult cardiology


Elevated troponin - likely demand ischemia from IV methamphetamine use, will 

trend. Cardiology consulted.


Hypokalemia - will replace. Check mag level


Ischemic cardiomyopathy -also with nonischemic cardiomyopathy due to 

methamphetamine use actively.  Fluid dynamics seems stable.


CAD - s/p x4 cardiac catherizations. multiple stents in the past. At least 12 

stents reported per patient


h/o VT s/p AICD - medtr[employer]. Will consult cardiology. needs device interrogation


HTN - cont meds


HLD - cont meds


Active IV meth use - used prior to admission. Tox screen pending and sees a 

counselor through [facility]


Tobaccoism -counseled on cessation he is nondistended nicotine patch states he 

will need to start smoking as soon as he leaves


Anxiety/agitation -calmed with verbal counseling, haldol. Prn zyprexa ordered


History of Drug seeking behavior -advised with his substance abuse history is 

high risk and should not be given an opioid prescription on discharge and to 

minimize opioids while inpatient


Anemia - likely from chronic substance abuse, will need monitoring outpatient. 

Check iron levels





FEN - Cardiac


PPX - Lovenox


FULL CODE


Dispo - inpatient for high risk ACS





Justifications for Admission


Other Justification


ACS











FELICITY WHITLOCK MD         Jan 5, 2021 18:40

## 2021-01-05 NOTE — RAD
CTA Chest with contrast:



Clinical History: Reason: CHEST PAIN / Spl. Instructions: [id number] 100ML / History:  Shortness of parvez
th.



Axial helical images of the chest were obtained after the administration of 100 cc of IV Omni 350 and
 timed appropriately for a pulmonary arterial study.  Conventional axial reconstruction was performed
 in addition to coronal, sagittal and bilateral oblique MIP (maximum intensity projection).  This katrin
dy was ordered to detect possible pulmonary embolism.



There are no filling defects to suggest pulmonary embolism. 

The more peripheral subsegmental pulmonary arteries are not well opacified limiting our sensitivity f
or small peripheral pulmonary emboli. 



The lungs and pleural margins are clear.  

There is no mediastinal or hilar lymphadenopathy.



The thoracic aorta appears normal.



Impression:



1.  No evidence of pulmonary embolism.

2.  No significant findings.



PQRS Compliance Statement:



One or more of the following individualized dose reduction techniques were utilized for this examinat
ion:  

1. Automated exposure control  

2. Adjustment of the mA and/or kV according to patient size  

3. Use of iterative reconstruction technique



Electronically signed by: Willy Jacobo III, MD (1/5/2021 6:33 PM) [facility]

## 2021-01-06 VITALS — SYSTOLIC BLOOD PRESSURE: 98 MMHG | DIASTOLIC BLOOD PRESSURE: 47 MMHG

## 2021-01-06 VITALS — DIASTOLIC BLOOD PRESSURE: 44 MMHG | SYSTOLIC BLOOD PRESSURE: 108 MMHG

## 2021-01-06 VITALS — DIASTOLIC BLOOD PRESSURE: 48 MMHG | SYSTOLIC BLOOD PRESSURE: 106 MMHG

## 2021-01-06 VITALS — DIASTOLIC BLOOD PRESSURE: 63 MMHG | SYSTOLIC BLOOD PRESSURE: 111 MMHG

## 2021-01-06 VITALS — SYSTOLIC BLOOD PRESSURE: 113 MMHG | DIASTOLIC BLOOD PRESSURE: 63 MMHG

## 2021-01-06 LAB
ALBUMIN SERPL-MCNC: 2.9 G/DL (ref 3.4–5)
ALBUMIN/GLOB SERPL: 0.9 {RATIO} (ref 1–1.7)
ALP SERPL-CCNC: 85 U/L (ref 46–116)
ALT SERPL-CCNC: 25 U/L (ref 16–63)
ANION GAP SERPL CALC-SCNC: 11 MMOL/L (ref 6–14)
AST SERPL-CCNC: 17 U/L (ref 15–37)
BASOPHILS # BLD AUTO: 0.1 X10^3/UL (ref 0–0.2)
BASOPHILS NFR BLD: 1 % (ref 0–3)
BILIRUB SERPL-MCNC: 0.3 MG/DL (ref 0.2–1)
BUN SERPL-MCNC: 8 MG/DL (ref 8–26)
BUN/CREAT SERPL: 11 (ref 6–20)
CALCIUM SERPL-MCNC: 8.5 MG/DL (ref 8.5–10.1)
CHLORIDE SERPL-SCNC: 104 MMOL/L (ref 98–107)
CO2 SERPL-SCNC: 23 MMOL/L (ref 21–32)
CREAT SERPL-MCNC: 0.7 MG/DL (ref 0.7–1.3)
EOSINOPHIL NFR BLD: 0.1 X10^3/UL (ref 0–0.7)
EOSINOPHIL NFR BLD: 3 % (ref 0–3)
ERYTHROCYTE [DISTWIDTH] IN BLOOD BY AUTOMATED COUNT: 16.9 % (ref 11.5–14.5)
GFR SERPLBLD BASED ON 1.73 SQ M-ARVRAT: 115 ML/MIN
GLUCOSE SERPL-MCNC: 99 MG/DL (ref 70–99)
HCT VFR BLD CALC: 31.1 % (ref 39–53)
HGB BLD-MCNC: 10.2 G/DL (ref 13–17.5)
LYMPHOCYTES # BLD: 0.8 X10^3/UL (ref 1–4.8)
LYMPHOCYTES NFR BLD AUTO: 20 % (ref 24–48)
MCH RBC QN AUTO: 25 PG (ref 25–35)
MCHC RBC AUTO-ENTMCNC: 33 G/DL (ref 31–37)
MCV RBC AUTO: 76 FL (ref 79–100)
MONO #: 0.6 X10^3/UL (ref 0–1.1)
MONOCYTES NFR BLD: 16 % (ref 0–9)
NEUT #: 2.4 X10^3/UL (ref 1.8–7.7)
NEUTROPHILS NFR BLD AUTO: 60 % (ref 31–73)
PLATELET # BLD AUTO: 266 X10^3/UL (ref 140–400)
POTASSIUM SERPL-SCNC: 3.6 MMOL/L (ref 3.5–5.1)
PROT SERPL-MCNC: 6.3 G/DL (ref 6.4–8.2)
RBC # BLD AUTO: 4.1 X10^6/UL (ref 4.3–5.7)
SODIUM SERPL-SCNC: 138 MMOL/L (ref 136–145)
WBC # BLD AUTO: 4.1 X10^3/UL (ref 4–11)

## 2021-01-06 RX ADMIN — ATORVASTATIN CALCIUM SCH MG: 40 TABLET, FILM COATED ORAL at 21:00

## 2021-01-06 RX ADMIN — CLOPIDOGREL BISULFATE SCH MG: 75 TABLET ORAL at 10:23

## 2021-01-06 RX ADMIN — ASPIRIN 81 MG SCH MG: 81 TABLET ORAL at 10:23

## 2021-01-06 RX ADMIN — Medication SCH MG: at 10:23

## 2021-01-06 NOTE — NUR
SS following for discharge planning. SS reviewed pt chart and discussed with pt RN. Pt is 
from home and is currently on room air. Cardiology following. Pt has issues with 
methamphetamine abuse. PAT team referral made for assessment and recommendations. SS will 
continue to follow for discharge planning.

## 2021-01-06 NOTE — PDOC3
Discharge Summary


Visit Information


Date of Admission:  Jan 5, 2021


Date of Discharge:  Jan 6, 2021


Final Diagnosis


Problems


Medical Problems:


(1) Chest pain


Status: Acute  











Brief Hospital Course


Allergies





                                    Allergies








Coded Allergies Type Severity Reaction Last Updated Verified


 


  acetaminophen Allergy Intermediate  10/8/20 Yes


 


  albuterol Adverse Reaction Intermediate  1/22/20 Yes


 


  ibuprofen Adverse Reaction Intermediate Nausea and Vomiting 1/22/20 Yes








Vital Signs





Vital Signs








  Date Time  Temp Pulse Resp B/P (MAP) Pulse Ox O2 Delivery O2 Flow Rate FiO2


 


1/6/21 15:01 97.7 90 20 113/63 (80) 98 Room Air  





 97.7       








Lab Results





Laboratory Tests








Test


 1/5/21


17:15 1/5/21


20:00 1/5/21


23:00 1/6/21


09:35


 


White Blood Count


 7.1 x10^3/uL


(4.0-11.0) 


 


 4.1 x10^3/uL


(4.0-11.0)


 


Red Blood Count


 4.16 x10^6/uL


(4.30-5.70) 


 


 4.10 x10^6/uL


(4.30-5.70)


 


Hemoglobin


 10.3 g/dL


(13.0-17.5) 


 


 10.2 g/dL


(13.0-17.5)


 


Hematocrit


 31.8 %


(39.0-53.0) 


 


 31.1 %


(39.0-53.0)


 


Mean Corpuscular Volume 76 fL ()    76 fL () 


 


Mean Corpuscular Hemoglobin 25 pg (25-35)    25 pg (25-35) 


 


Mean Corpuscular Hemoglobin


Concent 32 g/dL


(31-37) 


 


 33 g/dL


(31-37)


 


Red Cell Distribution Width


 16.7 %


(11.5-14.5) 


 


 16.9 %


(11.5-14.5)


 


Platelet Count


 303 x10^3/uL


(140-400) 


 


 266 x10^3/uL


(140-400)


 


Neutrophils (%) (Auto) 64 % (31-73)    60 % (31-73) 


 


Lymphocytes (%) (Auto) 18 % (24-48)    20 % (24-48) 


 


Monocytes (%) (Auto) 13 % (0-9)    16 % (0-9) 


 


Eosinophils (%) (Auto) 4 % (0-3)    3 % (0-3) 


 


Basophils (%) (Auto) 1 % (0-3)    1 % (0-3) 


 


Neutrophils # (Auto)


 4.5 x10^3/uL


(1.8-7.7) 


 


 2.4 x10^3/uL


(1.8-7.7)


 


Lymphocytes # (Auto)


 1.3 x10^3/uL


(1.0-4.8) 


 


 0.8 x10^3/uL


(1.0-4.8)


 


Monocytes # (Auto)


 0.9 x10^3/uL


(0.0-1.1) 


 


 0.6 x10^3/uL


(0.0-1.1)


 


Eosinophils # (Auto)


 0.3 x10^3/uL


(0.0-0.7) 


 


 0.1 x10^3/uL


(0.0-0.7)


 


Basophils # (Auto)


 0.1 x10^3/uL


(0.0-0.2) 


 


 0.1 x10^3/uL


(0.0-0.2)


 


Prothrombin Time


 13.9 SEC


(11.7-14.0) 


 


 





 


Prothromb Time International


Ratio 1.1 (0.8-1.1) 


 


 


 





 


D-Dimer (Camryn)


 0.73 ug/mlFEU


(0.00-0.50) 


 


 





 


Sodium Level


 133 mmol/L


(136-145) 


 


 138 mmol/L


(136-145)


 


Potassium Level


 3.2 mmol/L


(3.5-5.1) 


 


 3.6 mmol/L


(3.5-5.1)


 


Chloride Level


 97 mmol/L


() 


 


 104 mmol/L


()


 


Carbon Dioxide Level


 24 mmol/L


(21-32) 


 


 23 mmol/L


(21-32)


 


Anion Gap 12 (6-14)    11 (6-14) 


 


Blood Urea Nitrogen 9 mg/dL (8-26)    8 mg/dL (8-26) 


 


Creatinine


 0.9 mg/dL


(0.7-1.3) 


 


 0.7 mg/dL


(0.7-1.3)


 


Estimated GFR


(Cockcroft-Gault) 86.1 


 


 


 115.0 





 


BUN/Creatinine Ratio 10 (6-20)    11 (6-20) 


 


Glucose Level


 102 mg/dL


(70-99) 


 


 99 mg/dL


(70-99)


 


Calcium Level


 8.6 mg/dL


(8.5-10.1) 


 


 8.5 mg/dL


(8.5-10.1)


 


Iron Level


 24 ug/dL


() 


 


 





 


Total Iron Binding Capacity


 408 ug/dL


(250-450) 


 


 





 


Iron Saturation 6 % (15-34)    


 


Total Bilirubin


 0.4 mg/dL


(0.2-1.0) 


 


 0.3 mg/dL


(0.2-1.0)


 


Aspartate Amino Transf


(AST/SGOT) 24 U/L (15-37) 


 


 


 17 U/L (15-37) 





 


Alanine Aminotransferase


(ALT/SGPT) 32 U/L (16-63) 


 


 


 25 U/L (16-63) 





 


Alkaline Phosphatase


 97 U/L


() 


 


 85 U/L


()


 


Troponin I Quantitative


 0.027 ng/mL


(0.000-0.055) 0.029 ng/mL


(0.000-0.055) 0.021 ng/mL


(0.000-0.055) 





 


NT-Pro-B-Type Natriuretic


Peptide 1050 pg/mL


(0-124) 


 


 





 


Total Protein


 7.3 g/dL


(6.4-8.2) 


 


 6.3 g/dL


(6.4-8.2)


 


Albumin


 3.6 g/dL


(3.4-5.0) 


 


 2.9 g/dL


(3.4-5.0)


 


Albumin/Globulin Ratio 1.0 (1.0-1.7)    0.9 (1.0-1.7) 


 


Lipase


 154 U/L


() 


 


 





 


Magnesium Level


 


 1.9 mg/dL


(1.8-2.4) 


 











Laboratory Tests








Test


 1/5/21


17:15 1/5/21


20:00 1/5/21


23:00 1/6/21


09:35


 


White Blood Count


 7.1 x10^3/uL


(4.0-11.0) 


 


 4.1 x10^3/uL


(4.0-11.0)


 


Red Blood Count


 4.16 x10^6/uL


(4.30-5.70) 


 


 4.10 x10^6/uL


(4.30-5.70)


 


Hemoglobin


 10.3 g/dL


(13.0-17.5) 


 


 10.2 g/dL


(13.0-17.5)


 


Hematocrit


 31.8 %


(39.0-53.0) 


 


 31.1 %


(39.0-53.0)


 


Mean Corpuscular Volume 76 fL ()    76 fL () 


 


Mean Corpuscular Hemoglobin 25 pg (25-35)    25 pg (25-35) 


 


Mean Corpuscular Hemoglobin


Concent 32 g/dL


(31-37) 


 


 33 g/dL


(31-37)


 


Red Cell Distribution Width


 16.7 %


(11.5-14.5) 


 


 16.9 %


(11.5-14.5)


 


Platelet Count


 303 x10^3/uL


(140-400) 


 


 266 x10^3/uL


(140-400)


 


Neutrophils (%) (Auto) 64 % (31-73)    60 % (31-73) 


 


Lymphocytes (%) (Auto) 18 % (24-48)    20 % (24-48) 


 


Monocytes (%) (Auto) 13 % (0-9)    16 % (0-9) 


 


Eosinophils (%) (Auto) 4 % (0-3)    3 % (0-3) 


 


Basophils (%) (Auto) 1 % (0-3)    1 % (0-3) 


 


Neutrophils # (Auto)


 4.5 x10^3/uL


(1.8-7.7) 


 


 2.4 x10^3/uL


(1.8-7.7)


 


Lymphocytes # (Auto)


 1.3 x10^3/uL


(1.0-4.8) 


 


 0.8 x10^3/uL


(1.0-4.8)


 


Monocytes # (Auto)


 0.9 x10^3/uL


(0.0-1.1) 


 


 0.6 x10^3/uL


(0.0-1.1)


 


Eosinophils # (Auto)


 0.3 x10^3/uL


(0.0-0.7) 


 


 0.1 x10^3/uL


(0.0-0.7)


 


Basophils # (Auto)


 0.1 x10^3/uL


(0.0-0.2) 


 


 0.1 x10^3/uL


(0.0-0.2)


 


Prothrombin Time


 13.9 SEC


(11.7-14.0) 


 


 





 


Prothromb Time International


Ratio 1.1 (0.8-1.1) 


 


 


 





 


D-Dimer (Camryn)


 0.73 ug/mlFEU


(0.00-0.50) 


 


 





 


Sodium Level


 133 mmol/L


(136-145) 


 


 138 mmol/L


(136-145)


 


Potassium Level


 3.2 mmol/L


(3.5-5.1) 


 


 3.6 mmol/L


(3.5-5.1)


 


Chloride Level


 97 mmol/L


() 


 


 104 mmol/L


()


 


Carbon Dioxide Level


 24 mmol/L


(21-32) 


 


 23 mmol/L


(21-32)


 


Anion Gap 12 (6-14)    11 (6-14) 


 


Blood Urea Nitrogen 9 mg/dL (8-26)    8 mg/dL (8-26) 


 


Creatinine


 0.9 mg/dL


(0.7-1.3) 


 


 0.7 mg/dL


(0.7-1.3)


 


Estimated GFR


(Cockcroft-Gault) 86.1 


 


 


 115.0 





 


BUN/Creatinine Ratio 10 (6-20)    11 (6-20) 


 


Glucose Level


 102 mg/dL


(70-99) 


 


 99 mg/dL


(70-99)


 


Calcium Level


 8.6 mg/dL


(8.5-10.1) 


 


 8.5 mg/dL


(8.5-10.1)


 


Iron Level


 24 ug/dL


() 


 


 





 


Total Iron Binding Capacity


 408 ug/dL


(250-450) 


 


 





 


Iron Saturation 6 % (15-34)    


 


Total Bilirubin


 0.4 mg/dL


(0.2-1.0) 


 


 0.3 mg/dL


(0.2-1.0)


 


Aspartate Amino Transf


(AST/SGOT) 24 U/L (15-37) 


 


 


 17 U/L (15-37) 





 


Alanine Aminotransferase


(ALT/SGPT) 32 U/L (16-63) 


 


 


 25 U/L (16-63) 





 


Alkaline Phosphatase


 97 U/L


() 


 


 85 U/L


()


 


Troponin I Quantitative


 0.027 ng/mL


(0.000-0.055) 0.029 ng/mL


(0.000-0.055) 0.021 ng/mL


(0.000-0.055) 





 


NT-Pro-B-Type Natriuretic


Peptide 1050 pg/mL


(0-124) 


 


 





 


Total Protein


 7.3 g/dL


(6.4-8.2) 


 


 6.3 g/dL


(6.4-8.2)


 


Albumin


 3.6 g/dL


(3.4-5.0) 


 


 2.9 g/dL


(3.4-5.0)


 


Albumin/Globulin Ratio 1.0 (1.0-1.7)    0.9 (1.0-1.7) 


 


Lipase


 154 U/L


() 


 


 





 


Magnesium Level


 


 1.9 mg/dL


(1.8-2.4) 


 











Brief Hospital Course


Mr. Bishop  is a 60 old male who presented with chest pain, methamphetamine 

abuse.  Consultations placed to cardiology.  No plans for cardiac intervention, 

patient was stable to discharge home with outpatient follow-up.





Discharge Information


Condition at Discharge:  Stable


Disposition/Orders:  D/C to Home


Scheduled


Amlodipine Besylate (Amlodipine Besylate) 5 Mg Tablet, 2.5 MG PO DAILY for HTN, 

#30 Ref 1


   Prescribed by: SG OLIVO MD on 1/6/21 1557


Aspirin (Aspirin) 81 Mg Tab.chew, 1 TAB PO DAILY, #10


   Prescribed by: ELROY NORIEGA D.O. on 9/2/20 0607


   Last Action: Continued on 1/5/21 1842 by FELICITY WHITLOCK MD


Clopidogrel Bisulfate (Plavix) 75 Mg Tablet, 75 MG PO DAILY for TO PREVENT BLOOD

CLOTS, #30 Ref 0 (Reported)


   Entered as Reported by: Madelyn Sy on 4/17/15 2308


   Last Action: Continued on 1/5/21 1842 by FELICITY WHITLOCK MD


Metoprolol Succinate (Metoprolol Succinate ( Xl )) 25 Mg Tab.er.24h, 12.5 MG PO 

DAILY for FOR HYPERTENSION, #30 Ref 0 (Reported)


   Entered as Reported by: DEBBIE SLOAN [facility] on 1/22/20 0814


   Last Action: Continued on 1/5/21 1842 by FELICITY WHITLOCK MD


Pregabalin (Lyrica) 300 Mg Capsule, 1 CAP PO BID, #60 Ref 2 (Reported)


   Entered as Reported by: TOM MONTE on 6/14/16 0412


Rosuvastatin Calcium (Crestor) 40 Mg Tablet, 40 MG PO HS for FOR CHOLESTEROL, 

#30 Ref 0 (Reported)


   Entered as Reported by: BARBI MANE RN on 8/7/20 1803


   Last Action: Converted on 1/5/21 1843 by FELICITY WHITLOCK MD


Tiotropium Bromide (Spiriva) 18 Mcg Cap.w.dev, 1 CAP IH DAILY, #30 Ref 3 

(Reported)


   Entered as Reported by: TOM MONTE on 6/14/16 0412





Scheduled PRN


Nitroglycerin (NITROGLYCERIN SubLingual) 0.4 Mg Tab.subl, 0.4 MG SL PRN Q5MIN 

PRN for CHEST PAIN, (Reported)


   Entered as Reported by: TOM MONTE on 6/14/16 0412


   Last Action: Continued on 1/5/21 1843 by FELICITY WHITLOCK MD


Tramadol Hcl (Tramadol Hcl) 50 Mg Tablet, 50 MG PO PRN TID PRN for PAIN, #30


   Prescribed by: ELAYNE NUNEZ on 11/10/20 0940


   Last Action: Continued on 1/5/21 2035 by FELICITY WHITLOCK MD





Justicifation of Admission Dx:


Justifications for Admission:


Justification of Admission Dx:  N/A


Angina:  Symp at Rest











SG OLIVO MD             Jan 6, 2021 16:00

## 2021-01-06 NOTE — PDOC2
ECHO PACHECO APRN 1/6/21 0958:


CARDIAC CONSULT


DATE OF CONSULT


Date of Consult


DATE: 1/6/21 


TIME: 09:53





REASON FOR CONSULT


Reason for Consult:


Chest pain





REFERRING PHYSICIAN


Referring Physician:


Олег





SOURCE


Source:  Chart review, Patient





HISTORY OF PRESENT ILLNESS


HISTORY OF PRESENT ILLNESS


This is a 61 yo male admitted for complains of chest pain. Reports as sharp left

chest to neck and left arm. Denies any SOA. No n/v. Denies any fever chills or 

any GI symptoms. He is known to me and he does have issues with substance abuse.

He has failed to follow up with his cardiologist in [place], [place] to which he 

still continues to make excuses of not being able to see his regular 

cardiologist. He has given himself IV meth in the last 5 days reporting that the

last meth prepared for him was probably bad hence his symptoms. Verbalized 

compliance with meds. No leg swelling or PND or orthopnea. No trop elevation and

no acute EKG changes. He is also known for opioid seeking behavior.





PAST MEDICAL HISTORY


Past Medical History


Cardiovascular:  CAD, CHF (ICM), HTN, Hyperlipidemia, Other (VT)


Pulmonary:  Asthma


CENTRAL NERVOUS SYSTEM:  CVA


GI:  GERD


Heme/Onc:  No pertinent hx


Hepatobiliary:  No pertinent hx


Psych:  Anxiety


Musculoskeletal:  Osteoarthritis


Rheumatologic:  No pertinent hx


Infectious disease:  No pertinent hx


ENT:  No pertinent hx


Renal/:  No pertinent hx


Endocrine:  No pertinent hx


Dermatology:  No pertinent hx





PAST SURGICAL HISTORY


Past Surgical History


Pacemaker (AICD), Other (multiple PCI/stents.





FAMILY HISTORY


Family History:  Hypertension





SOCIAL HISTORY


Social History


Smoke:  <1 pack per day


ALCOHOL:  none


Drugs:  meth relapse


Lives:  with Family





CURRENT MEDICATIONS


CURRENT MEDICATIONS





Current Medications








 Medications


  (Trade)  Dose


 Ordered  Sig/Gwen


 Route


 PRN Reason  Start Time


 Stop Time Status Last Admin


Dose Admin


 


 Haloperidol


 Lactate


  (Haldol Inj)  5 mg  1X  ONCE


 IVP


   1/5/21 17:45


 1/5/21 17:46 DC 1/5/21 17:37





 


 Sodium Chloride  1,000 ml @ 


 1,000 mls/hr  1X  ONCE


 IV


   1/5/21 18:15


 1/5/21 19:14 DC 1/5/21 19:31





 


 Iohexol


  (Omnipaque 350


 Mg/ml)  100 ml  1X  ONCE


 IV


   1/5/21 18:30


 1/5/21 18:31 DC 1/5/21 18:23














ALLERGIES


ALLERGIES:  


Coded Allergies:  


     acetaminophen (Verified  Allergy, Intermediate, 10/8/20)


     albuterol (Verified  Adverse Reaction, Intermediate, 1/22/20)


   Patient states "it speeds my heart up too much"


     ibuprofen (Verified  Adverse Reaction, Intermediate, Nausea and Vomiting, 

1/22/20)





ROS


Review of System


14 point ROS evaluated with pertinent positives noted per HPI





PHYSICAL EXAM


General:  Alert, Oriented X3, Cooperative, No acute distress


HEENT:  Atraumatic, Mucous membr. moist/pink


Lungs:  Clear to auscultation, Normal air movement


Heart:  Regular rate (SR), Normal S1, Normal S2, No murmurs


Abdomen:  Soft, No tenderness


Extremities:  No cyanosis, No edema


Skin:  No breakdown, No significant lesion


Neuro:  Normal speech, Sensation intact


Psych/Mental Status:  Mental status NL, Mood NL


MUSCULOSKELETAL:  Osteoarthritic changes both hands





VITALS/I&O


VITALS/I&O:





                                   Vital Signs








  Date Time  Temp Pulse Resp B/P (MAP) Pulse Ox O2 Delivery O2 Flow Rate FiO2


 


1/6/21 08:00      Room Air  


 


1/6/21 07:00 98.0 77 16 98/47 (64) 99   





 98.0       














                                    I & O   


 


 1/5/21 1/5/21 1/6/21





 15:00 23:00 07:00


 


Intake Total  1300 ml 300 ml


 


Balance  1300 ml 300 ml











LABS


Lab:





                                Laboratory Tests








Test


 1/5/21


17:15 1/5/21


20:00 1/5/21


23:00 1/6/21


09:35


 


White Blood Count


 7.1 x10^3/uL


(4.0-11.0) 


 


 4.1 x10^3/uL


(4.0-11.0)


 


Red Blood Count


 4.16 x10^6/uL


(4.30-5.70)  L 


 


 4.10 x10^6/uL


(4.30-5.70)  L


 


Hemoglobin


 10.3 g/dL


(13.0-17.5)  L 


 


 10.2 g/dL


(13.0-17.5)  L


 


Hematocrit


 31.8 %


(39.0-53.0)  L 


 


 31.1 %


(39.0-53.0)  L


 


Mean Corpuscular Volume


 76 fL ()


L 


 


 76 fL ()


L


 


Mean Corpuscular Hemoglobin 25 pg (25-35)     25 pg (25-35)  


 


Mean Corpuscular Hemoglobin


Concent 32 g/dL


(31-37) 


 


 33 g/dL


(31-37)


 


Red Cell Distribution Width


 16.7 %


(11.5-14.5)  H 


 


 16.9 %


(11.5-14.5)  H


 


Platelet Count


 303 x10^3/uL


(140-400) 


 


 266 x10^3/uL


(140-400)


 


Neutrophils (%) (Auto) 64 % (31-73)     60 % (31-73)  


 


Lymphocytes (%) (Auto) 18 % (24-48)  L   20 % (24-48)  L


 


Monocytes (%) (Auto) 13 % (0-9)  H   16 % (0-9)  H


 


Eosinophils (%) (Auto) 4 % (0-3)  H   3 % (0-3)  


 


Basophils (%) (Auto) 1 % (0-3)     1 % (0-3)  


 


Neutrophils # (Auto)


 4.5 x10^3/uL


(1.8-7.7) 


 


 2.4 x10^3/uL


(1.8-7.7)


 


Lymphocytes # (Auto)


 1.3 x10^3/uL


(1.0-4.8) 


 


 0.8 x10^3/uL


(1.0-4.8)  L


 


Monocytes # (Auto)


 0.9 x10^3/uL


(0.0-1.1) 


 


 0.6 x10^3/uL


(0.0-1.1)


 


Eosinophils # (Auto)


 0.3 x10^3/uL


(0.0-0.7) 


 


 0.1 x10^3/uL


(0.0-0.7)


 


Basophils # (Auto)


 0.1 x10^3/uL


(0.0-0.2) 


 


 0.1 x10^3/uL


(0.0-0.2)


 


Prothrombin Time


 13.9 SEC


(11.7-14.0) 


 


 





 


Prothrombin Time INR 1.1 (0.8-1.1)     


 


D-Dimer (Camryn)


 0.73 ug/mlFEU


(0.00-0.50)  H 


 


 





 


Sodium Level


 133 mmol/L


(136-145)  L 


 


 





 


Potassium Level


 3.2 mmol/L


(3.5-5.1)  L 


 


 





 


Chloride Level


 97 mmol/L


()  L 


 


 





 


Carbon Dioxide Level


 24 mmol/L


(21-32) 


 


 





 


Anion Gap 12 (6-14)     


 


Blood Urea Nitrogen


 9 mg/dL (8-26)


 


 


 





 


Creatinine


 0.9 mg/dL


(0.7-1.3) 


 


 





 


Estimated GFR


(Cockcroft-Gault) 86.1  


 


 


 





 


BUN/Creatinine Ratio 10 (6-20)     


 


Glucose Level


 102 mg/dL


(70-99)  H 


 


 





 


Calcium Level


 8.6 mg/dL


(8.5-10.1) 


 


 





 


Iron Level


 24 ug/dL


()  L 


 


 





 


Total Iron Binding Capacity


 408 ug/dL


(250-450) 


 


 





 


Iron Saturation 6 % (15-34)  L   


 


Total Bilirubin


 0.4 mg/dL


(0.2-1.0) 


 


 





 


Aspartate Amino Transferase


(AST) 24 U/L (15-37)


 


 


 





 


Alanine Aminotransferase (ALT)


 32 U/L (16-63)


 


 


 





 


Alkaline Phosphatase


 97 U/L


() 


 


 





 


Troponin I Quantitative


 0.027 ng/mL


(0.000-0.055) 0.029 ng/mL


(0.000-0.055) 0.021 ng/mL


(0.000-0.055) 





 


NT-Pro-B-Type Natriuretic


Peptide 1050 pg/mL


(0-124)  H 


 


 





 


Total Protein


 7.3 g/dL


(6.4-8.2) 


 


 





 


Albumin


 3.6 g/dL


(3.4-5.0) 


 


 





 


Albumin/Globulin Ratio 1.0 (1.0-1.7)     


 


Lipase


 154 U/L


() 


 


 





 


Magnesium Level


 


 1.9 mg/dL


(1.8-2.4) 


 








                                Laboratory Tests


1/5/21 17:15








1/6/21 09:35








                                Laboratory Tests


1/5/21 17:15











ECHOCARDIOGRAM


ECHOCARDIOGRAM





<Conclusion>


Left ventricle systolic function is moderately impaired. EF 30-35%.


There is severe global hypokinesis of the left ventricle.


Tissue Doppler imaging reveals moderate left ventricular diastolic dysfunction.


There is a pacemaker lead in the right ventricle.


Doppler and Color-flow revealed moderate mitral regurgitation.


Doppler and Color Flow revealed mild tricuspid regurgitation. There is moderate 

pulmonary hypertension. The PA pressure was estimated at 44 mmHg.





DATE:     07/10/20 0907





HEART CATH


HEART CATH





FINDINGS


1.  Hemodynamics: Left ventricular end-diastolic pressure 18 mmHg.  No pullback 

gradient across aortic valve.


2.  Left ventriculography: Severe left ventricular systolic dysfunction with 

ejection fraction estimated at 20 to 25%.  2+ mitral regurgitation seen.


3.  Coronary angiography:


a.  The left main coronary artery arose from the left sinus of Valsalva, gave 

rise to the left anterior descending and left circumflex arteries and did not 

show any significant stenosis.


b.  The left anterior descending artery showed patent long stented mid and 

distal segments with a very distal segment showing 40% in-stent restenosis.  

Stent in the diagonal branch was patent.


c.  The left circumflex artery was a large and dominant vessel.  The first 

obtuse marginal branch showed 100% chronic total occlusion within the stent in 

the proximal segment, described in prior cardiac catheterization.  The stent in 

the left posterior descending artery was patent.


d.  The right coronary artery was a small and nondominant vessel arising from 

the right sinus of Valsalva that showed 100% chronic total occlusion in the 

proximal segment, described in prior cardiac catheterization.





Conclusion


1.  Patent previously placed stents in the left anterior descending artery, 

diagonal branch and left posterior descending artery.  The obtuse marginal 

branch and nondominant right coronary artery showed 100% chronic total 

occlusions, described in prior cardiac catheterization.


2.  Severe left ventricular systolic dysfunction with ejection fraction 

estimated at 20 to 25% with 2+ mitral regurgitation.





Recommendations


Optimization of medical therapy for coronary disease and ischemic 

cardiomyopathy.





DATE:     10/09/20 1353





ASSESSMENT/PLAN


ASSESSMENT/PLAN


1.  Chest pain: trops nml. EKG paced without acute changes. Suspect MSK with 

vasopasm component as well


2.  Opioid seeking behavior


3.  Chronic systolic CHF; clinically compensated 


4.  ICM; s/p AICD ([employer])


5.  CAD; recent [facility] 10/2020, patent stents no intervenable lesions


6.  Hx of VT: not on antiarrhythmic, no arrhythmias


7.  HTN: controlled


8.  HLP: not on goal


10.  Substance abuse: IV meth relapse used it 5 days in a row. 


11. Tobaccoism with likely COPD


12. Poor compliance with appt. 





Recommendations


Start on low reed norvasc if pt agrees. No further testing. May DC from cardiac 

perspective. 


Continue ASA/plavix and  secondary prevention measures. Replace K


Smoking cessation abstain from meth, follow up with his primary outpt 

cardiologist. Encouraged to follow up06 with [place] cardiologist at [facility] [place]


Discussed important of compliance with medical therapy/followup and abstinence 

of recreational drug use





NOVA ESPINOSA MD 1/6/21 [id number]:


CARDIAC CONSULT


ASSESSMENT/PLAN


ASSESSMENT/PLAN


Patient seen and examined.  Agree with NP's assessment and plan.


Chest pain with atypical features and most probably musculoskeletal.


Slight troponin elevation probably demand ischemia.


Recent cardiac catheterization showed patent previously placed stents in 

LAD/diagonal/left PDA with chronic total occlusions involving the OM and nond

ominant RCA.  Patient did not have any lesions needing intervention.


Chronic systolic heart failure clinically well compensated.


Recent device check showed normal function.


Importance of abstinence from drug use reemphasized.


Thank you for your consultation











ECHO PACHECO           Jan 6, 2021 09:58


NOVA ESPINOSA MD            Jan 6, 2021 16:39

## 2021-01-06 NOTE — PDOC
[employer] PROGRESS NOTE


Date of Service


DOS:


DATE: 1/6/21 


TIME: 12:51





Chief Complaint


Chief Complaint


Assessment/Plan


A/P:


Chest pain - high risk ACS given his history and lateral TWI, will trend 

troponins, telemetry, consult cardiology


Elevated troponin - likely demand ischemia from IV methamphetamine use, will 

trend. Cardiology consulted.


Hypokalemia - will replace. Check mag level


Ischemic cardiomyopathy -also with nonischemic cardiomyopathy due to 

methamphetamine use actively.  Fluid dynamics seems stable.


CAD - s/p x4 cardiac catherizations. multiple stents in the past. At least 12 

stents reported per patient


h/o VT s/p AICD - medtronic. Will consult cardiology. needs device interrogation


HTN - cont meds


HLD - cont meds


Active IV meth use - used prior to admission. Tox screen pending and sees a cou

nselor through [facility]


Tobaccoism -counseled on cessation he is nondistended nicotine patch states he 

will need to start smoking as soon as he leaves


Anxiety/agitation -calmed with verbal counseling, haldol. Prn zyprexa ordered


History of Drug seeking behavior -advised with his substance abuse history is 

high risk and should not be given an opioid prescription on discharge and to 

minimize opioids while inpatient


Anemia - likely from chronic substance abuse, will need monitoring outpatient. 

Check iron levels





FEN - Cardiac


PPX - Lovenox


FULL CODE


Dispo - inpatient for high risk ACS





History of Present Illness


History of Present Illness


Mr Bishop is a 59yo M w/ PMHx CAD x multiple GENNY, CHF, HTN, HLD, VT s/p AICD, 

COPD/asthma, GERD, OA, anxiety, IV methamphetamine abuse c/o chest pain with 

radiation to left arm and jaw in the evening prior to admission after he feels 

he did not properly "cut" his methamphetine with water prior to injection. He 

began having wheezing and chest pain. Pain is sharp, notes it did not remit 

after 3x NTG at home and ASA.  He rates his symptoms at 9/10.  He keeps asking 

for pain meds, became very agitated with staff, calmed down after iv haldol 

administration.


EKG with lateral TWI and leftward axis, no STEMI. CXR with no acute abnormality


Labs with WBC 7.1, Hb 10.3 with MCV 76, platelets 303, Na 133, K 3.2, BUN 9, Cr 

0.9, glucose 102, BNP 1050, Troponin 0.027. D dimer 0.73.


He underwent CTPA which was negative for pulmonary embolism.


Chest radiograph


Historically with multiple PCIs- C at [facility] Jan 2018 showed showed patent

mid LAD stent, patent 1st diagonal stent, total chronic occlusion of first 

obtuse marginal with territory infarct but no indication for PCI, patent stent 

in left AV groove artery, patent stent in LPDA, non-dominant 100 % stenosis of 

proximal RCA. Seen by cardiology here in January.


Admit for further work-up of his coronary artery disease





1/6: Patient seen and evaluated bedside.  He is not very talkative currently, 

states he feels "like hell".  Await cardiology recommendations for any possible 

intervention.  If not, patient may discharge today.





Vitals/I&O


Vitals/I&O:





                                   Vital Signs








  Date Time  Temp Pulse Resp B/P (MAP) Pulse Ox O2 Delivery O2 Flow Rate FiO2


 


1/6/21 12:24  82  106/48    


 


1/6/21 10:49 97.2  18  95 Room Air  





 97.2       














                                    I & O   


 


 1/5/21 1/5/21 1/6/21





 15:00 23:00 07:00


 


Intake Total  1300 ml 300 ml


 


Balance  1300 ml 300 ml











Physical Exam


General:  Alert, mild distress


Heart:  Regular rate


Lungs:  Clear


Abdomen:  Normal bowel sounds, Soft


Extremities:  No clubbing, No cyanosis, No edema, Normal pulses


Skin:  No rashes, No breakdown





Labs


Labs:





Laboratory Tests








Test


 1/5/21


17:15 1/5/21


20:00 1/5/21


23:00 1/6/21


09:35


 


White Blood Count


 7.1 x10^3/uL


(4.0-11.0) 


 


 4.1 x10^3/uL


(4.0-11.0)


 


Red Blood Count


 4.16 x10^6/uL


(4.30-5.70) 


 


 4.10 x10^6/uL


(4.30-5.70)


 


Hemoglobin


 10.3 g/dL


(13.0-17.5) 


 


 10.2 g/dL


(13.0-17.5)


 


Hematocrit


 31.8 %


(39.0-53.0) 


 


 31.1 %


(39.0-53.0)


 


Mean Corpuscular Volume 76 fL ()    76 fL () 


 


Mean Corpuscular Hemoglobin 25 pg (25-35)    25 pg (25-35) 


 


Mean Corpuscular Hemoglobin


Concent 32 g/dL


(31-37) 


 


 33 g/dL


(31-37)


 


Red Cell Distribution Width


 16.7 %


(11.5-14.5) 


 


 16.9 %


(11.5-14.5)


 


Platelet Count


 303 x10^3/uL


(140-400) 


 


 266 x10^3/uL


(140-400)


 


Neutrophils (%) (Auto) 64 % (31-73)    60 % (31-73) 


 


Lymphocytes (%) (Auto) 18 % (24-48)    20 % (24-48) 


 


Monocytes (%) (Auto) 13 % (0-9)    16 % (0-9) 


 


Eosinophils (%) (Auto) 4 % (0-3)    3 % (0-3) 


 


Basophils (%) (Auto) 1 % (0-3)    1 % (0-3) 


 


Neutrophils # (Auto)


 4.5 x10^3/uL


(1.8-7.7) 


 


 2.4 x10^3/uL


(1.8-7.7)


 


Lymphocytes # (Auto)


 1.3 x10^3/uL


(1.0-4.8) 


 


 0.8 x10^3/uL


(1.0-4.8)


 


Monocytes # (Auto)


 0.9 x10^3/uL


(0.0-1.1) 


 


 0.6 x10^3/uL


(0.0-1.1)


 


Eosinophils # (Auto)


 0.3 x10^3/uL


(0.0-0.7) 


 


 0.1 x10^3/uL


(0.0-0.7)


 


Basophils # (Auto)


 0.1 x10^3/uL


(0.0-0.2) 


 


 0.1 x10^3/uL


(0.0-0.2)


 


Prothrombin Time


 13.9 SEC


(11.7-14.0) 


 


 





 


Prothromb Time International


Ratio 1.1 (0.8-1.1) 


 


 


 





 


D-Dimer (Camryn)


 0.73 ug/mlFEU


(0.00-0.50) 


 


 





 


Sodium Level


 133 mmol/L


(136-145) 


 


 138 mmol/L


(136-145)


 


Potassium Level


 3.2 mmol/L


(3.5-5.1) 


 


 3.6 mmol/L


(3.5-5.1)


 


Chloride Level


 97 mmol/L


() 


 


 104 mmol/L


()


 


Carbon Dioxide Level


 24 mmol/L


(21-32) 


 


 23 mmol/L


(21-32)


 


Anion Gap 12 (6-14)    11 (6-14) 


 


Blood Urea Nitrogen 9 mg/dL (8-26)    8 mg/dL (8-26) 


 


Creatinine


 0.9 mg/dL


(0.7-1.3) 


 


 0.7 mg/dL


(0.7-1.3)


 


Estimated GFR


(Cockcroft-Gault) 86.1 


 


 


 115.0 





 


BUN/Creatinine Ratio 10 (6-20)    11 (6-20) 


 


Glucose Level


 102 mg/dL


(70-99) 


 


 99 mg/dL


(70-99)


 


Calcium Level


 8.6 mg/dL


(8.5-10.1) 


 


 8.5 mg/dL


(8.5-10.1)


 


Iron Level


 24 ug/dL


() 


 


 





 


Total Iron Binding Capacity


 408 ug/dL


(250-450) 


 


 





 


Iron Saturation 6 % (15-34)    


 


Total Bilirubin


 0.4 mg/dL


(0.2-1.0) 


 


 0.3 mg/dL


(0.2-1.0)


 


Aspartate Amino Transf


(AST/SGOT) 24 U/L (15-37) 


 


 


 17 U/L (15-37) 





 


Alanine Aminotransferase


(ALT/SGPT) 32 U/L (16-63) 


 


 


 25 U/L (16-63) 





 


Alkaline Phosphatase


 97 U/L


() 


 


 85 U/L


()


 


Troponin I Quantitative


 0.027 ng/mL


(0.000-0.055) 0.029 ng/mL


(0.000-0.055) 0.021 ng/mL


(0.000-0.055) 





 


NT-Pro-B-Type Natriuretic


Peptide 1050 pg/mL


(0-124) 


 


 





 


Total Protein


 7.3 g/dL


(6.4-8.2) 


 


 6.3 g/dL


(6.4-8.2)


 


Albumin


 3.6 g/dL


(3.4-5.0) 


 


 2.9 g/dL


(3.4-5.0)


 


Albumin/Globulin Ratio 1.0 (1.0-1.7)    0.9 (1.0-1.7) 


 


Lipase


 154 U/L


() 


 


 





 


Magnesium Level


 


 1.9 mg/dL


(1.8-2.4) 


 














Review of Systems


Review of Systems:


Chest pain.  Denies any shortness of breath, or nausea.





Assessment and Plan


Assessmemt and Plan


Problems


Medical Problems:


(1) Chest pain


Status: Acute  











Comment


Review of Relevant


I have reviewed the following items luke (where applicable) has been applied.


Medications:





Current Medications








 Medications


  (Trade)  Dose


 Ordered  Sig/Gwen


 Route


 PRN Reason  Start Time


 Stop Time Status Last Admin


Dose Admin


 


 Haloperidol


 Lactate


  (Haldol Inj)  5 mg  1X  ONCE


 IVP


   1/5/21 17:45


 1/5/21 17:46 DC 1/5/21 17:37





 


 Sodium Chloride  1,000 ml @ 


 1,000 mls/hr  1X  ONCE


 IV


   1/5/21 18:15


 1/5/21 19:14 DC 1/5/21 19:31





 


 Iohexol


  (Omnipaque 350


 Mg/ml)  100 ml  1X  ONCE


 IV


   1/5/21 18:30


 1/5/21 18:31 DC 1/5/21 18:23





 


 Aspirin


  (Aspirin


 Chewable)  81 mg  DAILY


 PO


   1/6/21 09:00


    1/6/21 10:23





 


 Clopidogrel


 Bisulfate


  (Plavix)  75 mg  DAILY


 PO


   1/6/21 09:00


    1/6/21 10:23





 


 Polysaccharide


 Iron Complex


  (Niferex 150)  150 mg  DAILY


 PO


   1/6/21 09:00


    1/6/21 10:23





 


 Amlodipine


 Besylate


  (Norvasc)  2.5 mg  DAILY


 PO


   1/6/21 11:30


    1/6/21 12:24














Justifications for Admission


Other Justification


ACS











SG OLIVO MD             Jan 6, 2021 12:55

## 2021-01-07 VITALS — DIASTOLIC BLOOD PRESSURE: 56 MMHG | SYSTOLIC BLOOD PRESSURE: 99 MMHG

## 2021-01-07 VITALS — SYSTOLIC BLOOD PRESSURE: 99 MMHG | DIASTOLIC BLOOD PRESSURE: 56 MMHG

## 2021-01-07 RX ADMIN — CLOPIDOGREL BISULFATE SCH MG: 75 TABLET ORAL at 08:52

## 2021-01-07 RX ADMIN — Medication SCH MG: at 08:52

## 2021-01-07 RX ADMIN — ASPIRIN 81 MG SCH MG: 81 TABLET ORAL at 08:52

## 2021-01-07 NOTE — NUR
SS following up with discharge planning. SS reviewed pt chart and discussed with pt RN. Pt 
is currently on room air. Discharge order on the chart. [employer] contacted and ride 
scheduled for today. Trip# [id number]. Pt and pt's RN notified.
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
## 2021-01-07 NOTE — NUR
Discharge Note:



LEFTY RIOS



Discharge instructions and discharge home medications reviewed with Patient and a copy 
given. All questions have been answered and understanding verbalized. 



The following instructions and handouts were given: Substance abuse,  smoking cessation, and 
cardiac diet. Patient transport via z-trip to [facility] [employer]. 



Discontinued iv line and intact.



Patient discharged to home.

## 2021-01-07 NOTE — NUR
SS following up with discharge planning. [employer] contacted SS and floor, RN, stating 
that due to pt's behaviors they will not transport pt. Pt lives three hours away and is 
wanting to discharge to home. [facility] reporting that they are not able to transport pt that 
distance today. Pt encouraged to find family or friends to assist with transportation. RN 
supervisor notified and administration contacted for further advice. SS will continue to 
follow for discharge planning.

## 2021-01-07 NOTE — EKG
[facility]

              [address] [place] [address]

Test Date:    2021               Test Time:    16:09:35

Pat Name:     JOB RIOS           Department:   

Patient ID:   [id number]           Room:          

Gender:       M                        Technician:   

:          1960               Requested By: BRISA BARRON

Order Number: [id number]           Reading MD:     

                                 Measurements

Intervals                              Axis          

Rate:         113                      P:            58

NV:           96                       QRS:          -52

QRSD:         156                      T:            112

QT:           366                                    

QTc:          501                                    

                           Interpretive Statements

SINUS TACHYCARDIA

VENTRICULAR PREMATURE COMPLEX(ES)

LEFT ATRIAL ABNORMALITY

ABNORMAL LEFT AXIS DEVIATION

NON SPECIFIC INTRAVENTRICULAR BLOCK

QRS(T) CONTOUR ABNORMALITY

CONSIDER ANTEROLATERAL MYOCARDIAL DAMAGE

ABNORMAL ECG

RI6.01

No previous ECG available for comparison

## 2021-02-04 ENCOUNTER — HOSPITAL ENCOUNTER (INPATIENT)
Dept: HOSPITAL 61 - ER | Age: 61
LOS: 5 days | Discharge: HOME | DRG: 302 | End: 2021-02-09
Attending: STUDENT IN AN ORGANIZED HEALTH CARE EDUCATION/TRAINING PROGRAM | Admitting: STUDENT IN AN ORGANIZED HEALTH CARE EDUCATION/TRAINING PROGRAM
Payer: MEDICAID

## 2021-02-04 VITALS — WEIGHT: 141.32 LBS | HEIGHT: 67 IN | BODY MASS INDEX: 22.18 KG/M2

## 2021-02-04 VITALS — DIASTOLIC BLOOD PRESSURE: 75 MMHG | SYSTOLIC BLOOD PRESSURE: 115 MMHG

## 2021-02-04 DIAGNOSIS — R07.9: ICD-10-CM

## 2021-02-04 DIAGNOSIS — E87.1: ICD-10-CM

## 2021-02-04 DIAGNOSIS — I11.0: ICD-10-CM

## 2021-02-04 DIAGNOSIS — F15.10: ICD-10-CM

## 2021-02-04 DIAGNOSIS — F41.9: ICD-10-CM

## 2021-02-04 DIAGNOSIS — Z86.73: ICD-10-CM

## 2021-02-04 DIAGNOSIS — I34.0: ICD-10-CM

## 2021-02-04 DIAGNOSIS — Z76.5: ICD-10-CM

## 2021-02-04 DIAGNOSIS — Z88.8: ICD-10-CM

## 2021-02-04 DIAGNOSIS — Z95.5: ICD-10-CM

## 2021-02-04 DIAGNOSIS — F17.210: ICD-10-CM

## 2021-02-04 DIAGNOSIS — I25.110: Primary | ICD-10-CM

## 2021-02-04 DIAGNOSIS — Z95.810: ICD-10-CM

## 2021-02-04 DIAGNOSIS — D64.9: ICD-10-CM

## 2021-02-04 DIAGNOSIS — Z82.49: ICD-10-CM

## 2021-02-04 DIAGNOSIS — Z88.5: ICD-10-CM

## 2021-02-04 DIAGNOSIS — Z71.6: ICD-10-CM

## 2021-02-04 DIAGNOSIS — I25.5: ICD-10-CM

## 2021-02-04 DIAGNOSIS — M19.90: ICD-10-CM

## 2021-02-04 DIAGNOSIS — K21.9: ICD-10-CM

## 2021-02-04 DIAGNOSIS — E78.5: ICD-10-CM

## 2021-02-04 DIAGNOSIS — I50.23: ICD-10-CM

## 2021-02-04 DIAGNOSIS — I42.8: ICD-10-CM

## 2021-02-04 DIAGNOSIS — E87.6: ICD-10-CM

## 2021-02-04 LAB
ALBUMIN SERPL-MCNC: 3.4 G/DL (ref 3.4–5)
ALBUMIN/GLOB SERPL: 1 {RATIO} (ref 1–1.7)
ALP SERPL-CCNC: 92 U/L (ref 46–116)
ALT SERPL-CCNC: 18 U/L (ref 16–63)
ANION GAP SERPL CALC-SCNC: 10 MMOL/L (ref 6–14)
AST SERPL-CCNC: 18 U/L (ref 15–37)
BASOPHILS # BLD AUTO: 0.1 X10^3/UL (ref 0–0.2)
BASOPHILS NFR BLD: 2 % (ref 0–3)
BILIRUB SERPL-MCNC: 0.3 MG/DL (ref 0.2–1)
BUN SERPL-MCNC: 8 MG/DL (ref 8–26)
BUN/CREAT SERPL: 8 (ref 6–20)
CALCIUM SERPL-MCNC: 9.2 MG/DL (ref 8.5–10.1)
CHLORIDE SERPL-SCNC: 98 MMOL/L (ref 98–107)
CO2 SERPL-SCNC: 24 MMOL/L (ref 21–32)
CREAT SERPL-MCNC: 1 MG/DL (ref 0.7–1.3)
EOSINOPHIL NFR BLD: 0.3 X10^3/UL (ref 0–0.7)
EOSINOPHIL NFR BLD: 5 % (ref 0–3)
ERYTHROCYTE [DISTWIDTH] IN BLOOD BY AUTOMATED COUNT: 18.1 % (ref 11.5–14.5)
GFR SERPLBLD BASED ON 1.73 SQ M-ARVRAT: 76 ML/MIN
GLUCOSE SERPL-MCNC: 100 MG/DL (ref 70–99)
HCT VFR BLD CALC: 30.3 % (ref 39–53)
HGB BLD-MCNC: 10.4 G/DL (ref 13–17.5)
LIPASE: 186 U/L (ref 73–393)
LYMPHOCYTES # BLD: 0.9 X10^3/UL (ref 1–4.8)
LYMPHOCYTES NFR BLD AUTO: 15 % (ref 24–48)
MCH RBC QN AUTO: 26 PG (ref 25–35)
MCHC RBC AUTO-ENTMCNC: 34 G/DL (ref 31–37)
MCV RBC AUTO: 75 FL (ref 79–100)
MONO #: 0.6 X10^3/UL (ref 0–1.1)
MONOCYTES NFR BLD: 10 % (ref 0–9)
NEUT #: 3.8 X10^3/UL (ref 1.8–7.7)
NEUTROPHILS NFR BLD AUTO: 68 % (ref 31–73)
PLATELET # BLD AUTO: 269 X10^3/UL (ref 140–400)
POTASSIUM SERPL-SCNC: 4 MMOL/L (ref 3.5–5.1)
PROT SERPL-MCNC: 6.9 G/DL (ref 6.4–8.2)
RBC # BLD AUTO: 4.06 X10^6/UL (ref 4.3–5.7)
SODIUM SERPL-SCNC: 132 MMOL/L (ref 136–145)
WBC # BLD AUTO: 5.6 X10^3/UL (ref 4–11)

## 2021-02-04 PROCEDURE — 93005 ELECTROCARDIOGRAM TRACING: CPT

## 2021-02-04 PROCEDURE — 94760 N-INVAS EAR/PLS OXIMETRY 1: CPT

## 2021-02-04 PROCEDURE — 85025 COMPLETE CBC W/AUTO DIFF WBC: CPT

## 2021-02-04 PROCEDURE — G0379 DIRECT REFER HOSPITAL OBSERV: HCPCS

## 2021-02-04 PROCEDURE — 83690 ASSAY OF LIPASE: CPT

## 2021-02-04 PROCEDURE — 84484 ASSAY OF TROPONIN QUANT: CPT

## 2021-02-04 PROCEDURE — 80053 COMPREHEN METABOLIC PANEL: CPT

## 2021-02-04 PROCEDURE — 96374 THER/PROPH/DIAG INJ IV PUSH: CPT

## 2021-02-04 PROCEDURE — 83880 ASSAY OF NATRIURETIC PEPTIDE: CPT

## 2021-02-04 PROCEDURE — 80048 BASIC METABOLIC PNL TOTAL CA: CPT

## 2021-02-04 PROCEDURE — G0378 HOSPITAL OBSERVATION PER HR: HCPCS

## 2021-02-04 PROCEDURE — 36415 COLL VENOUS BLD VENIPUNCTURE: CPT

## 2021-02-04 PROCEDURE — 71045 X-RAY EXAM CHEST 1 VIEW: CPT

## 2021-02-04 PROCEDURE — 83735 ASSAY OF MAGNESIUM: CPT

## 2021-02-04 PROCEDURE — 94640 AIRWAY INHALATION TREATMENT: CPT

## 2021-02-04 RX ADMIN — HEPARIN SODIUM SCH UNIT: 5000 INJECTION, SOLUTION INTRAVENOUS; SUBCUTANEOUS at 20:36

## 2021-02-04 RX ADMIN — ZOLPIDEM TARTRATE PRN MG: 5 TABLET ORAL at 20:36

## 2021-02-04 NOTE — PHYS DOC
Past Medical History


Past Medical History:  Asthma, CAD, CHF, CVA, High Cholesterol, Hypertension, 

MI, Other


Additional Past Medical Histor:  VTACH, Drug abuse-Methamphetamine


 (KALEY TAYLOR DO)


Past Surgical History:  Pacemaker, Splenectomy, Other


Additional Past Surgical Histo:  12 cardiac stents, multiple cardiac 

catheterizations,WITH DEFIB


 (KALEY TAYLOR DO)


Smoking Status:  Current Every Day Smoker


Alcohol Use:  Rarely


Drug Use:  Methamphetamine


 (KALEY TAYLOR DO)





General Adult


EDM:


Chief Complaint:  CHEST PAIN





HPI:


HPI:





Patient is a 61  year old male who presented to ER due to substernal chest pain 

that radiated to his neck and his right arm  started about 45 minutes ago.  

Patient denies any cough or fever, no COVID-19 exposure.  Patient denies any 

abdominal pain, no nausea vomiting.  Patient has history of coronary disease, 

had multiple stents in the past, patient also history of V. tach, had 

defibrillator placement in the past.  Patient has been evaluated here multiple 

times due to chest pain, IV methamphetamine abuse.  Patient said his 

cardiologist is not around here.  Patient admitted of using IV methamphetamine 

yesterday.


 (KALEY TAYLOR DO)





Review of Systems:


Review of Systems:


Constitutional:   Denies fever or chills. []


Eyes:   Denies change in visual acuity. []


HENT:   Denies nasal congestion or sore throat. [] 


Respiratory:   Denies cough or shortness of breath. [] 


Cardiovascular: Positive for chest pain, no edema


GI:   Denies abdominal pain, nausea, vomiting, bloody stools or diarrhea. [] 


:  Denies dysuria. [] 


Musculoskeletal:   Denies back pain or joint pain. [] 


Integument:   Denies rash. [] 


Neurologic:   Denies headache, focal weakness or sensory changes. [] 


Endocrine:   Denies polyuria or polydipsia. [] 


Lymphatic:  Denies swollen glands. [] 


Psychiatric:  Denies depression or anxiety. []


 (KALEY TAYLOR DO)





Heart Score:


HEART Score for Chest Pain:  








HEART Score for Chest Pain Response (Comments) Value


 


History Moderately Suspicious 1


 


ECG Nonspecific Repolarizatio 1


 


Age >45 - < 65 1


 


Risk Factors >3 Risk Factors or Hx CAD 2


 


Troponin < Normal Limit 0


 


Total  5








Risk Factors:


Risk Factors:  DM, Current or recent (<one month) smoker, HTN, HLP, family 

history of CAD, obesity.


Risk Scores:


Score 0 - 3:  2.5% MACE over next 6 weeks - Discharge Home


Score 4 - 6:  20.3% MACE over next 6 weeks - Admit for Clinical Observation


Score 7 - 10:  72.7% MACE over next 6 weeks - Early Invasive Strategies


 (KALEY TAYLOR DO)





Allergies:


Allergies:





Allergies








Coded Allergies Type Severity Reaction Last Updated Verified


 


  acetaminophen Allergy Intermediate  10/8/20 Yes


 


  albuterol Adverse Reaction Intermediate  1/22/20 Yes


 


  ibuprofen Adverse Reaction Intermediate Nausea and Vomiting 1/22/20 Yes








 (KAELY TAYLOR DO)





Physical Exam:


PE:





Constitutional: Well developed, well nourished, no acute distress, non-toxic 

appearance. []


HENT: Normocephalic, atraumatic, bilateral external ears normal, oropharynx 

moist, no oral exudates, nose normal. []


Eyes: PERRLA, EOMI, conjunctiva normal, no discharge. [] 


Neck: Normal range of motion, no tenderness, supple, no stridor. [] 


Cardiovascular:Heart rate regular rhythm, systolic murmur. 


Lungs & Thorax:  Bilateral breath sounds clear to auscultation []


Abdomen: Bowel sounds normal, soft, no tenderness, no masses, no pulsatile 

masses. [] 


Skin: Warm, dry, no erythema, no rash. [] 


Back: No tenderness, no CVA tenderness. [] 


Extremities: No tenderness, no cyanosis, no clubbing, ROM intact, no edema. [] 


Neurologic: Alert and oriented X 3, normal motor function, normal sensory 

function, no focal deficits noted. []


Psychologic: Affect normal, judgement normal, mood normal. []


 (KALEY TAYLOR DO)





EKG:


EKG:


EKG was done at 1744, heart rate of 110 bpm, paced rhythm, ventricular premature

 complex, no ST segment ovation compared to EKG on January 5, 2021, no 

significant changes


 (KALEY TAYLOR DO)





Radiology/Procedures:


Radiology/Procedures:


[]


 (KALEY TAYLOR DO)





Course & Med Decision Making:


Course & Med Decision Making


Pertinent Labs and Imaging studies reviewed. (See chart for details)





Patient is a 61-year-old male who presented to ER due to substernal chest pain 

that radiated to his neck and arm.  Patient had history of methamphetamine 

abuse, admitted of using IV methamphetamine yesterday.  Patient had extensive 

history of coronary disease, had multiple stents, has history of V. tach, status

 post defibrillator placement.  Patient had a heart cath on October 9, 2020 show

 EF of 25%, no acute stenosis noted.  Lab work and x-ray of the chest are 

pending at this time, endorsed care to the incoming physician at shift change, 

Dr. Elie Tafoya.  





 (KALEY TAYLOR DO)


Course & Med Decision Making


Assumed care of patient at checkout from Dr. Taylor.  At checkout patient had labs

 and chest x-ray pending.  Shortly after my arrival patient started requesting 

pain medication.  He became aggressive and was yelling at nursing staff.  He was

 offered Toradol and nitro.  Patient initially refused the Toradol and nitro.  

He states the nitro only helps for a little bit and that the Toradol upsets his 

stomach.  He is requesting morphine or fentanyl.  I have discussed with him that

 at this time given his intoxication with methamphetamine this does not seem to 

be safe.  Patient then started to request that we put him in an ambulance and 

sent him to [facility].  I have discussed with the patient that we are happy to 

admit him here so he can see our cardiologist but that we do not have grand to 

transfer him at this time.  Patient does appear to be acutely intoxicated on my 

examination.  He does admit to using methamphetamines over the last 24 hours.  

Patient then started asking for Ativan.  I offered him hydroxyzine for anxiety. 

 Patient states that he needs Ativan in his IV.  After further discussion 

patient has agreed to take the hydroxyzine.  Will refrain from using narcotics 

and benzodiazepines here in the emergency room. Patient will be admitted for 

high risk chest pain


 (ELIE TAFOYA MD)


Dragon Disclaimer:


Dragon Disclaimer:


This electronic medical record was generated, in whole or in part, using a voice

 recognition dictation system.


 (KALEY TAYLOR DO)





Departure


Departure


Impression:  


   Primary Impression:  


   Chest pain


   Additional Impressions:  


   Methamphetamine abuse


   Request for narcotic pain medication


   Patient requests medication, not given


Disposition:  09 ADMITTED AS INPT THIS HOSP


Condition:  STABLE


Referrals:  


NO PCP (PCP)











KALEY TAYLOR DO                 Feb 4, 2021 17:45


ELIE TAFOYA MD               Feb 4, 2021 19:12

## 2021-02-04 NOTE — EKG
[address] [place] [address]

Test Date:    2021               Test Time:    17:42:55

Pat Name:     JOB RIOS           Department:   

Patient ID:   [id number]           Room:          

Gender:       M                        Technician:   

:          1960               Requested By: KALEY GRACE

Order Number: [id number]           Reading MD:   Yuan Vance

                                 Measurements

Intervals                              Axis          

Rate:         110                      P:            49

WY:           90                       QRS:          -54

QRSD:         148                      T:            135

QT:           372                                    

QTc:          510                                    

                           Interpretive Statements

ATRIAL SENSED VENTRICULAR PACED RHYTHM

VENTRICULAR PREMATURE COMPLEXES

Electronically Signed On 2021 9:57:53 CST by Yuan Vance

## 2021-02-04 NOTE — PDOC1
History and Physical


Date of Admission


Date of Admission


DATE: 2/4/21 


TIME: 19:41





Identification/Chief Complaint


Chief Complaint


Chest pain





Source


Source:  Chart review, Patient





History of Present Illness


History of Present Illness


Patient is a 59yo M with past medical history CAD with stent, CHF, HTN, HLD, VT 

s/p AICD, COPD/asthma, GERD, OA, anxiety, IV methamphetamine abuse presents to 

the ED with complaint of sharp chest pain with radiation to his right neck.  

Patient is well-known to our service with multiple admissions with similar 

complaints.  He states today was unusual because his chest pain only radiates to

the left but this time radiated to the right.  States his pain is constant but 

fluctuating in severity, 79/10. Had a recent left heart catheterization on 

10/9/2020 showing patent previously placed stents in the left anterior 

descending artery, diagonal branch and left posterior descending artery, the 

obtuse marginal branch and nondominant right coronary artery showed 100% chronic

total occlusions, severe left ventricular systolic dysfunction with ejection 

fraction estimated at 20 to 25% with 2+ mitral regurgitation.  He was 

recommended optimization of medical therapy for coronary disease and ischemic 

cardiomyopathy.  He denies any associated leg swelling.  He is currently 

requesting food, stating he has not eaten in 4 days because he has been doing 

methamphetamine.  Initial troponin <0.017, BNP 1179.  Will admit patient for 

further medical management.





Past Medical History


Cardiovascular:  CAD, CHF, HTN, Hyperlipidemia, Other


Pulmonary:  Asthma


CENTRAL NERVOUS SYSTEM:  CVA


GI:  GERD


Heme/Onc:  No pertinent hx


Hepatobiliary:  No pertinent hx


Psych:  Anxiety


Musculoskeletal:  Osteoarthritis


Rheumatologic:  No pertinent hx


Infectious disease:  No pertinent hx


Renal/:  No pertinent hx


Endocrine:  No pertinent hx





Past Surgical History


Past Surgical History:  Pacemaker, Other





Family History


Family History:  Hypertension





Social History


Smoke:  <1 pack per day


ALCOHOL:  none


Drugs:  Crystal meth, Other





Current Problem List


Problem List


Problems


Medical Problems:


(1) Chest pain


Status: Acute  





(2) Patient requests medication, not given


Status: Acute  





(3) Request for narcotic pain medication


Status: Acute  











Current Medications


Current Medications





Current Medications


Aspirin (Aspirin Chewable) 324 mg 1X  ONCE PO  Last administered on 2/4/21at 

18:33;  Start 2/4/21 at 18:30;  Stop 2/4/21 at 18:31;  Status DC


Ketorolac Tromethamine (Toradol 30mg Vial) 30 mg 1X  ONCE IVP ;  Start 2/4/21 at

18:30;  Stop 2/4/21 at 18:31;  Status DC


Ketorolac Tromethamine (Toradol 30mg Vial) 30 mg 1X  ONCE IVP  Last administered

on 2/4/21at 19:13;  Start 2/4/21 at 19:15;  Stop 2/4/21 at 19:16;  Status DC


Hydroxyzine HCl (Atarax) 25 mg 1X  PRN PO ITCHING Last administered on 2/4/21at 

19:12;  Start 2/4/21 at 19:15





Active Scripts


Active


Amlodipine Besylate 5 Mg Tablet 2.5 Mg PO DAILY


Tramadol Hcl 50 Mg Tablet 50 Mg PO PRN TID PRN


Aspirin 81 Mg Tab.chew 1 Tab PO DAILY


Reported


Crestor (Rosuvastatin Calcium) 40 Mg Tablet 40 Mg PO HS


Metoprolol Succinate ( Xl ) (Metoprolol Succinate) 25 Mg Tab.er.24h 12.5 Mg PO 

DAILY


Spiriva (Tiotropium [place]) 18 Mcg Cap.w.dev 1 Cap IH DAILY


NITROGLYCERIN SubLingual (Nitroglycerin) 0.4 Mg Tab.subl 0.4 Mg SL PRN Q5MIN PRN


Lyrica (Pregabalin) 300 Mg Capsule 1 Cap PO BID


Plavix (Clopidogrel Bisulfate) 75 Mg Tablet 75 Mg PO DAILY





Allergies


Allergies:  


Coded Allergies:  


     acetaminophen (Verified  Allergy, Intermediate, 10/8/20)


     albuterol (Verified  Adverse Reaction, Intermediate, 1/22/20)


   Patient states "it speeds my heart up too much"


     ibuprofen (Verified  Adverse Reaction, Intermediate, Nausea and Vomiting, 

1/22/20)





ROS


Review of System


GENERAL:  No history of weight change, weakness or fevers.


SKIN:  No bruising, hair changes or rashes.


EYES:  No blurred, double or loss of vision.


NOSE AND THROAT:  No history of nosebleeds, hoarseness or sore throat.


HEART: Chest pain. Denies palpitations.


LUNGS:  Denies cough, hemoptysis, wheezing or shortness of breath.


GASTROINTESTINAL: Denies nausea, vomiting, abdominal pain.


GENITOURINARY: Denies dysuria, frequency, urgency, hematuria.


NEUROLOGIC:  Denies history of numbness, tingling, tremor or weakness.


PSYCHIATRIC: Denies anxiety, denies depression.


ENDOCRINE:  No history of heat or cold intolerance, polyuria or polydipsia.


EXTREMITIES:  Denies muscle weakness, joint pain, pain on walking or stiffness.





Physical Exam


Physical Exam


General:  Alert, Oriented X3, Cooperative, No acute distress


HEENT:  PERRLA, EOMI


Lungs:  Clear to auscultation, Normal air movement


Heart:  RRR, no murmurs


Cardiovascular:  S1, S2


Abdomen:  Normal bowel sounds, Soft, No tenderness


Extremities:  No clubbing, No cyanosis


Skin:  No rashes, No significant lesion


Neuro:  Normal speech, Normal tone, Sensation intact


Psych/Mental Status:  Mental status NL, Mood NL





Vitals


Vitals





Vital Signs








  Date Time  Temp Pulse Resp B/P (MAP) Pulse Ox O2 Delivery O2 Flow Rate FiO2


 


2/4/21 17:34 97.8 113 24 106/69 (81) 94 Room Air  





 97.8       











Labs


Labs





Laboratory Tests








Test


 2/4/21


18:40


 


White Blood Count


 5.6 x10^3/uL


(4.0-11.0)


 


Red Blood Count


 4.06 x10^6/uL


(4.30-5.70)


 


Hemoglobin


 10.4 g/dL


(13.0-17.5)


 


Hematocrit


 30.3 %


(39.0-53.0)


 


Mean Corpuscular Volume 75 fL () 


 


Mean Corpuscular Hemoglobin 26 pg (25-35) 


 


Mean Corpuscular Hemoglobin


Concent 34 g/dL


(31-37)


 


Red Cell Distribution Width


 18.1 %


(11.5-14.5)


 


Platelet Count


 269 x10^3/uL


(140-400)


 


Neutrophils (%) (Auto) 68 % (31-73) 


 


Lymphocytes (%) (Auto) 15 % (24-48) 


 


Monocytes (%) (Auto) 10 % (0-9) 


 


Eosinophils (%) (Auto) 5 % (0-3) 


 


Basophils (%) (Auto) 2 % (0-3) 


 


Neutrophils # (Auto)


 3.8 x10^3/uL


(1.8-7.7)


 


Lymphocytes # (Auto)


 0.9 x10^3/uL


(1.0-4.8)


 


Monocytes # (Auto)


 0.6 x10^3/uL


(0.0-1.1)


 


Eosinophils # (Auto)


 0.3 x10^3/uL


(0.0-0.7)


 


Basophils # (Auto)


 0.1 x10^3/uL


(0.0-0.2)


 


Sodium Level


 132 mmol/L


(136-145)


 


Potassium Level


 4.0 mmol/L


(3.5-5.1)


 


Chloride Level


 98 mmol/L


()


 


Carbon Dioxide Level


 24 mmol/L


(21-32)


 


Anion Gap 10 (6-14) 


 


Blood Urea Nitrogen 8 mg/dL (8-26) 


 


Creatinine


 1.0 mg/dL


(0.7-1.3)


 


Estimated GFR


(Cockcroft-Gault) 76.0 





 


BUN/Creatinine Ratio 8 (6-20) 


 


Glucose Level


 100 mg/dL


(70-99)


 


Calcium Level


 9.2 mg/dL


(8.5-10.1)


 


Total Bilirubin


 0.3 mg/dL


(0.2-1.0)


 


Aspartate Amino Transf


(AST/SGOT) 18 U/L (15-37) 





 


Alanine Aminotransferase


(ALT/SGPT) 18 U/L (16-63) 





 


Alkaline Phosphatase


 92 U/L


()


 


Troponin I Quantitative


 < 0.017 ng/mL


(0.000-0.055)


 


NT-Pro-B-Type Natriuretic


Peptide 1179 pg/mL


(0-124)


 


Total Protein


 6.9 g/dL


(6.4-8.2)


 


Albumin


 3.4 g/dL


(3.4-5.0)


 


Albumin/Globulin Ratio 1.0 (1.0-1.7) 


 


Lipase


 186 U/L


()








Laboratory Tests








Test


 2/4/21


18:40


 


White Blood Count


 5.6 x10^3/uL


(4.0-11.0)


 


Red Blood Count


 4.06 x10^6/uL


(4.30-5.70)


 


Hemoglobin


 10.4 g/dL


(13.0-17.5)


 


Hematocrit


 30.3 %


(39.0-53.0)


 


Mean Corpuscular Volume 75 fL () 


 


Mean Corpuscular Hemoglobin 26 pg (25-35) 


 


Mean Corpuscular Hemoglobin


Concent 34 g/dL


(31-37)


 


Red Cell Distribution Width


 18.1 %


(11.5-14.5)


 


Platelet Count


 269 x10^3/uL


(140-400)


 


Neutrophils (%) (Auto) 68 % (31-73) 


 


Lymphocytes (%) (Auto) 15 % (24-48) 


 


Monocytes (%) (Auto) 10 % (0-9) 


 


Eosinophils (%) (Auto) 5 % (0-3) 


 


Basophils (%) (Auto) 2 % (0-3) 


 


Neutrophils # (Auto)


 3.8 x10^3/uL


(1.8-7.7)


 


Lymphocytes # (Auto)


 0.9 x10^3/uL


(1.0-4.8)


 


Monocytes # (Auto)


 0.6 x10^3/uL


(0.0-1.1)


 


Eosinophils # (Auto)


 0.3 x10^3/uL


(0.0-0.7)


 


Basophils # (Auto)


 0.1 x10^3/uL


(0.0-0.2)


 


Sodium Level


 132 mmol/L


(136-145)


 


Potassium Level


 4.0 mmol/L


(3.5-5.1)


 


Chloride Level


 98 mmol/L


()


 


Carbon Dioxide Level


 24 mmol/L


(21-32)


 


Anion Gap 10 (6-14) 


 


Blood Urea Nitrogen 8 mg/dL (8-26) 


 


Creatinine


 1.0 mg/dL


(0.7-1.3)


 


Estimated GFR


(Cockcroft-Gault) 76.0 





 


BUN/Creatinine Ratio 8 (6-20) 


 


Glucose Level


 100 mg/dL


(70-99)


 


Calcium Level


 9.2 mg/dL


(8.5-10.1)


 


Total Bilirubin


 0.3 mg/dL


(0.2-1.0)


 


Aspartate Amino Transf


(AST/SGOT) 18 U/L (15-37) 





 


Alanine Aminotransferase


(ALT/SGPT) 18 U/L (16-63) 





 


Alkaline Phosphatase


 92 U/L


()


 


Troponin I Quantitative


 < 0.017 ng/mL


(0.000-0.055)


 


NT-Pro-B-Type Natriuretic


Peptide 1179 pg/mL


(0-124)


 


Total Protein


 6.9 g/dL


(6.4-8.2)


 


Albumin


 3.4 g/dL


(3.4-5.0)


 


Albumin/Globulin Ratio 1.0 (1.0-1.7) 


 


Lipase


 186 U/L


()











Images


Images


INDICATION: Reason: chest pain / Spl. Instructions:  / History: 





COMPARISON: January 5, 2021





FINDINGS:





Single view of chest obtained.


Pacemaker is again identified. There is some elevation of the left 

hemidiaphragm. Air-filled prominent loops of bowel are seen at the partially 

visualized upper abdomen. Mild interstitial prominence bilaterally with patchy 

opacity left lower base.








IMPRESSION:





*  Mild interstitial opacities bilaterally which could be from mild pulmonary 

vascular congestion or interstitial infiltrate.





*  Mild haziness at the left lung base which could be from atelectasis given the

elevated left hemidiaphragm.





*  Air-filled distended loops of bowel are seen in the partially visualized 

upper abdomen. Would correlate with symptoms and if the patient has symptoms 

dedicated imaging of the abdomen could BE obtained to better evaluate.





VTE Prophylaxis Ordered


VTE Prophylaxis Devices:  No


VTE Pharmacological Prophylaxi:  Yes





Assessment/Plan


Assessment/Plan


Unstable angina


CAD


Elevated BNP





Plan:


Troponins undetectable; will continue to trend


Consulted cardiology


Morphine, nitroglycerin as needed


Resume home medications


FEN - Cardiac diet


PPX  - Heparin


FULL CODE


Dispo - inpatient for above





Justifications for Admission


Other Justification


ACS











SG OLIVO MD             Feb 4, 2021 19:41

## 2021-02-04 NOTE — RAD
INDICATION: Reason: chest pain / Spl. Instructions:  / History: 



COMPARISON: January 5, 2021



FINDINGS:



Single view of chest obtained.

Pacemaker is again identified. There is some elevation of the left hemidiaphragm. Air-filled prominen
t loops of bowel are seen at the partially visualized upper abdomen. Mild interstitial prominence nakia
aterally with patchy opacity left lower base.





IMPRESSION:



*  Mild interstitial opacities bilaterally which could be from mild pulmonary vascular congestion or 
interstitial infiltrate.



*  Mild haziness at the left lung base which could be from atelectasis given the elevated left hemidi
aphragm.



*  Air-filled distended loops of bowel are seen in the partially visualized upper abdomen. Would faisal
elate with symptoms and if the patient has symptoms dedicated imaging of the abdomen could BE obtaine
d to better evaluate.



Electronically signed by: Jorge Horton MD (2/4/2021 6:26 PM) [id number]

## 2021-02-05 VITALS — SYSTOLIC BLOOD PRESSURE: 91 MMHG | DIASTOLIC BLOOD PRESSURE: 57 MMHG

## 2021-02-05 VITALS — SYSTOLIC BLOOD PRESSURE: 111 MMHG | DIASTOLIC BLOOD PRESSURE: 55 MMHG

## 2021-02-05 VITALS — SYSTOLIC BLOOD PRESSURE: 90 MMHG | DIASTOLIC BLOOD PRESSURE: 52 MMHG

## 2021-02-05 VITALS — DIASTOLIC BLOOD PRESSURE: 52 MMHG | SYSTOLIC BLOOD PRESSURE: 83 MMHG

## 2021-02-05 VITALS — SYSTOLIC BLOOD PRESSURE: 102 MMHG | DIASTOLIC BLOOD PRESSURE: 42 MMHG

## 2021-02-05 VITALS — DIASTOLIC BLOOD PRESSURE: 69 MMHG | SYSTOLIC BLOOD PRESSURE: 108 MMHG

## 2021-02-05 LAB
ANION GAP SERPL CALC-SCNC: 11 MMOL/L (ref 6–14)
BASOPHILS # BLD AUTO: 0.1 X10^3/UL (ref 0–0.2)
BASOPHILS NFR BLD: 1 % (ref 0–3)
BUN SERPL-MCNC: 15 MG/DL (ref 8–26)
CALCIUM SERPL-MCNC: 9.1 MG/DL (ref 8.5–10.1)
CHLORIDE SERPL-SCNC: 101 MMOL/L (ref 98–107)
CO2 SERPL-SCNC: 23 MMOL/L (ref 21–32)
CREAT SERPL-MCNC: 1 MG/DL (ref 0.7–1.3)
EOSINOPHIL NFR BLD: 0.2 X10^3/UL (ref 0–0.7)
EOSINOPHIL NFR BLD: 5 % (ref 0–3)
ERYTHROCYTE [DISTWIDTH] IN BLOOD BY AUTOMATED COUNT: 18.2 % (ref 11.5–14.5)
GFR SERPLBLD BASED ON 1.73 SQ M-ARVRAT: 76 ML/MIN
GLUCOSE SERPL-MCNC: 101 MG/DL (ref 70–99)
HCT VFR BLD CALC: 30.8 % (ref 39–53)
HGB BLD-MCNC: 10 G/DL (ref 13–17.5)
LYMPHOCYTES # BLD: 1 X10^3/UL (ref 1–4.8)
LYMPHOCYTES NFR BLD AUTO: 21 % (ref 24–48)
MCH RBC QN AUTO: 24 PG (ref 25–35)
MCHC RBC AUTO-ENTMCNC: 33 G/DL (ref 31–37)
MCV RBC AUTO: 75 FL (ref 79–100)
MONO #: 0.7 X10^3/UL (ref 0–1.1)
MONOCYTES NFR BLD: 16 % (ref 0–9)
NEUT #: 2.6 X10^3/UL (ref 1.8–7.7)
NEUTROPHILS NFR BLD AUTO: 57 % (ref 31–73)
PLATELET # BLD AUTO: 274 X10^3/UL (ref 140–400)
POTASSIUM SERPL-SCNC: 3.6 MMOL/L (ref 3.5–5.1)
RBC # BLD AUTO: 4.11 X10^6/UL (ref 4.3–5.7)
SODIUM SERPL-SCNC: 135 MMOL/L (ref 136–145)
WBC # BLD AUTO: 4.6 X10^3/UL (ref 4–11)

## 2021-02-05 RX ADMIN — NITROGLYCERIN SCH INCH: 20 OINTMENT TOPICAL at 20:57

## 2021-02-05 RX ADMIN — NITROGLYCERIN SCH INCH: 20 OINTMENT TOPICAL at 17:48

## 2021-02-05 RX ADMIN — MORPHINE SULFATE PRN MG: 2 INJECTION, SOLUTION INTRAMUSCULAR; INTRAVENOUS at 10:21

## 2021-02-05 RX ADMIN — METOPROLOL SUCCINATE SCH MG: 25 TABLET, EXTENDED RELEASE ORAL at 13:41

## 2021-02-05 RX ADMIN — NITROGLYCERIN SCH INCH: 20 OINTMENT TOPICAL at 13:00

## 2021-02-05 RX ADMIN — ASPIRIN 81 MG SCH MG: 81 TABLET ORAL at 13:40

## 2021-02-05 RX ADMIN — PSYLLIUM HUSK SCH PKT: 3.4 POWDER ORAL at 20:57

## 2021-02-05 RX ADMIN — HEPARIN SODIUM SCH UNIT: 5000 INJECTION, SOLUTION INTRAVENOUS; SUBCUTANEOUS at 13:42

## 2021-02-05 RX ADMIN — IPRATROPIUM BROMIDE SCH MG: 0.5 SOLUTION RESPIRATORY (INHALATION) at 15:47

## 2021-02-05 RX ADMIN — HEPARIN SODIUM SCH UNIT: 5000 INJECTION, SOLUTION INTRAVENOUS; SUBCUTANEOUS at 20:57

## 2021-02-05 RX ADMIN — MORPHINE SULFATE PRN MG: 2 INJECTION, SOLUTION INTRAMUSCULAR; INTRAVENOUS at 19:34

## 2021-02-05 RX ADMIN — MORPHINE SULFATE PRN MG: 2 INJECTION, SOLUTION INTRAMUSCULAR; INTRAVENOUS at 14:30

## 2021-02-05 RX ADMIN — IPRATROPIUM BROMIDE SCH MG: 0.5 SOLUTION RESPIRATORY (INHALATION) at 19:37

## 2021-02-05 RX ADMIN — PREGABALIN SCH MG: 75 CAPSULE ORAL at 20:56

## 2021-02-05 RX ADMIN — ZOLPIDEM TARTRATE PRN MG: 5 TABLET ORAL at 20:56

## 2021-02-05 RX ADMIN — HEPARIN SODIUM SCH UNIT: 5000 INJECTION, SOLUTION INTRAVENOUS; SUBCUTANEOUS at 04:41

## 2021-02-05 RX ADMIN — CLOPIDOGREL BISULFATE SCH MG: 75 TABLET ORAL at 13:40

## 2021-02-05 RX ADMIN — ATORVASTATIN CALCIUM SCH MG: 40 TABLET, FILM COATED ORAL at 20:55

## 2021-02-05 RX ADMIN — IPRATROPIUM BROMIDE SCH MG: 0.5 SOLUTION RESPIRATORY (INHALATION) at 13:00

## 2021-02-05 NOTE — NUR
SW following. Discussed with RN, pt from home in the [facility], room air, cardiac diet. [facility] 
referral for meth abuse. DILMA will continue to follow.

-------------------------------------------------------------------------------

Addendum: 02/05/21 at 1455 by NENITA PERERA

-------------------------------------------------------------------------------

John (SKIP) met with pt, pt uses meth daily, no SI/HI/ mental health dx. Pt has had 
treatment in the past at [facility], and is open with [facility] in [place]. Pt has a case 
manager, who he speaks to regularly. Pt will get help when he is ready. No SW needs at this 
time. Cleared by [facility] team.

## 2021-02-05 NOTE — PDOC
[employer] PROGRESS NOTE


Date of Service


DOS:


DATE: 2/5/21 


TIME: 07:22





Chief Complaint


Chief Complaint


A/P:


Chest pain - high risk ACS given his history and lateral TWI, will trend 

troponins, telemetry


Acute on chronic CHF - left ventricular systolic dysfunction with ejection 

fraction estimated at 20 to 25%


Hypokalemia - will replace. Check mag level


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

tamine use actively.  Fluid dynamics and CXR seem to indicate acute CHF, will 

initiate IV lasix


CAD - s/p x4 cardiac catherizations. multiple stents in the past. At least 12 

stents reported per patient - previously placed stents in the left anterior 

descending artery, diagonal branch and left posterior descending artery.  The 

obtuse marginal branch and nondominant right coronary artery showed 100% chronic

total occlusions, described in prior cardiac catheterization. (Cath report from 

10/2020)


Mitral regurgitation


h/o VT s/p AICD - med[employer]. No 


HTN - cont meds


HLD - cont meds


Active IV meth use - used prior to admission. Tox screen pending and sees a 

counselor through [facility]


Tobaccoism -counseled on cessation he is nondistended nicotine patch states he 

will need to start smoking as soon as he leaves


Anxiety/agitation -calmed with verbal counseling, haldol. Prn zyprexa ordered


History of Drug seeking behavior -advised with his substance abuse history is 

high risk and should not be given an opioid prescription on discharge and to 

minimize opioids while inpatient


Anemia - likely from chronic substance abuse, will need monitoring outpatient. 

Check iron levels





FEN - Cardiac


PPX - Lovenox


FULL CODE


Dispo - inpatient for high risk ACS





History of Present Illness


History of Present Illness


Mr Bishop is a 61yo M w/ PMHx CAD x multiple GENNY, CHF, HTN, HLD, VT s/p AICD, 

COPD/asthma, GERD, OA, anxiety, IV methamphetamine abuse who comes directly from

his "meth house" to the ED with complaint of sharp chest pain with radiation to 

his right neck.  Patient is well-known to our service with multiple admissions 

with similar complaints. Notes the pain on 2/4/21 was unusual because his chest 

pain only radiates to the left but this time radiated to the right.  States his 

pain is constant but fluctuating in severity, 79/10. Had a recent left heart 

catheterization on 10/9/2020 showing patent previously placed stents in the left

anterior descending artery, diagonal branch and left posterior descending 

artery, the obtuse marginal branch and nondominant right coronary artery showed 

100% chronic total occlusions, severe left ventricular systolic dysfunction with

ejection fraction estimated at 20 to 25% with 2+ mitral regurgitation.  He was 

recommended optimization of medical therapy for coronary disease and ischemic 

cardiomyopathy.  He denies any associated leg swelling.  He is currently 

requesting food, stating he has not eaten in 4 days because he has been doing 

methamphetamine IV in both arms.





EKG with lateral TWI and leftward axis, no STEMI. CXR with interstitial findings

concerning for acute CHF


Troponin <0.017, BNP 1179.


Historically with multiple PCIs- C at [facility] Jan 2018 showed showed patent

mid LAD stent, patent 1st diagonal stent, total chronic occlusion of first 

obtuse marginal with territory infarct but no indication for PCI, patent stent 

in left AV groove artery, patent stent in LPDA, non-dominant 100 % stenosis of 

proximal RCA. Seen by cardiology here in January.


Admit for further work-up of his coronary artery disease





BP low. Telemetry with tachycardia multiple PVCs.  Still short of breath still 

with pain.  He notes he still can be 2 to 6 weeks until he is able to get into 

methamphetamine rehab and he has been injecting for the last 4 days and is 

"starving". Pain relieved with NTG, but only temporarily.





Plan:


Cont CVC


IV lasix


Restart home metoprolol


Nitropaste if BP allows


Inhaled ipratropium





Vitals/I&O


Vitals/I&O:





                                   Vital Signs








  Date Time  Temp Pulse Resp B/P (MAP) Pulse Ox O2 Delivery O2 Flow Rate FiO2


 


2/4/21 21:16      Room Air  


 


2/4/21 20:19 98.5 109 20 115/75 (88) 99   





 98.5       














                                    I & O   


 


 2/4/21 2/4/21 2/5/21





 15:00 23:00 07:00


 


Intake Total  480 ml 0 ml


 


Balance  480 ml 0 ml











Physical Exam


General:  Alert, Oriented X3, Cooperative, moderate distress


Heart:  Regular rate, Normal S1, Normal S2


Lungs:  Wheezing, Crackles





Labs


Labs:





Laboratory Tests








Test


 2/4/21


18:40 2/5/21


04:27


 


White Blood Count


 5.6 x10^3/uL


(4.0-11.0) 4.6 x10^3/uL


(4.0-11.0)


 


Red Blood Count


 4.06 x10^6/uL


(4.30-5.70) 4.11 x10^6/uL


(4.30-5.70)


 


Hemoglobin


 10.4 g/dL


(13.0-17.5) 10.0 g/dL


(13.0-17.5)


 


Hematocrit


 30.3 %


(39.0-53.0) 30.8 %


(39.0-53.0)


 


Mean Corpuscular Volume 75 fL ()  75 fL () 


 


Mean Corpuscular Hemoglobin 26 pg (25-35)  24 pg (25-35) 


 


Mean Corpuscular Hemoglobin


Concent 34 g/dL


(31-37) 33 g/dL


(31-37)


 


Red Cell Distribution Width


 18.1 %


(11.5-14.5) 18.2 %


(11.5-14.5)


 


Platelet Count


 269 x10^3/uL


(140-400) 274 x10^3/uL


(140-400)


 


Neutrophils (%) (Auto) 68 % (31-73)  57 % (31-73) 


 


Lymphocytes (%) (Auto) 15 % (24-48)  21 % (24-48) 


 


Monocytes (%) (Auto) 10 % (0-9)  16 % (0-9) 


 


Eosinophils (%) (Auto) 5 % (0-3)  5 % (0-3) 


 


Basophils (%) (Auto) 2 % (0-3)  1 % (0-3) 


 


Neutrophils # (Auto)


 3.8 x10^3/uL


(1.8-7.7) 2.6 x10^3/uL


(1.8-7.7)


 


Lymphocytes # (Auto)


 0.9 x10^3/uL


(1.0-4.8) 1.0 x10^3/uL


(1.0-4.8)


 


Monocytes # (Auto)


 0.6 x10^3/uL


(0.0-1.1) 0.7 x10^3/uL


(0.0-1.1)


 


Eosinophils # (Auto)


 0.3 x10^3/uL


(0.0-0.7) 0.2 x10^3/uL


(0.0-0.7)


 


Basophils # (Auto)


 0.1 x10^3/uL


(0.0-0.2) 0.1 x10^3/uL


(0.0-0.2)


 


Sodium Level


 132 mmol/L


(136-145) 135 mmol/L


(136-145)


 


Potassium Level


 4.0 mmol/L


(3.5-5.1) 3.6 mmol/L


(3.5-5.1)


 


Chloride Level


 98 mmol/L


() 101 mmol/L


()


 


Carbon Dioxide Level


 24 mmol/L


(21-32) 23 mmol/L


(21-32)


 


Anion Gap 10 (6-14)  11 (6-14) 


 


Blood Urea Nitrogen


 8 mg/dL (8-26) 


 15 mg/dL


(8-26)


 


Creatinine


 1.0 mg/dL


(0.7-1.3) 1.0 mg/dL


(0.7-1.3)


 


Estimated GFR


(Cockcroft-Gault) 76.0 


 76.0 





 


BUN/Creatinine Ratio 8 (6-20)  


 


Glucose Level


 100 mg/dL


(70-99) 101 mg/dL


(70-99)


 


Calcium Level


 9.2 mg/dL


(8.5-10.1) 9.1 mg/dL


(8.5-10.1)


 


Total Bilirubin


 0.3 mg/dL


(0.2-1.0) 





 


Aspartate Amino Transf


(AST/SGOT) 18 U/L (15-37) 


 





 


Alanine Aminotransferase


(ALT/SGPT) 18 U/L (16-63) 


 





 


Alkaline Phosphatase


 92 U/L


() 





 


Troponin I Quantitative


 < 0.017 ng/mL


(0.000-0.055) 





 


NT-Pro-B-Type Natriuretic


Peptide 1179 pg/mL


(0-124) 





 


Total Protein


 6.9 g/dL


(6.4-8.2) 





 


Albumin


 3.4 g/dL


(3.4-5.0) 





 


Albumin/Globulin Ratio 1.0 (1.0-1.7)  


 


Lipase


 186 U/L


() 














Assessment and Plan


Assessmemt and Plan


Problems


Medical Problems:


(1) Chest pain


Status: Acute  





(2) Patient requests medication, not given


Status: Acute  





(3) Request for narcotic pain medication


Status: Acute  











Comment


Review of Relevant


I have reviewed the following items luke (where applicable) has been applied.


Medications:





Current Medications








 Medications


  (Trade)  Dose


 Ordered  Sig/Gwen


 Route


 PRN Reason  Start Time


 Stop Time Status Last Admin


Dose Admin


 


 Aspirin


  (Aspirin


 Chewable)  324 mg  1X  ONCE


 PO


   2/4/21 18:30


 2/4/21 18:31 DC 2/4/21 18:33





 


 Ketorolac


 Tromethamine


  (Toradol 30mg


 Vial)  30 mg  1X  ONCE


 IVP


   2/4/21 19:15


 2/4/21 19:16 DC 2/4/21 19:13





 


 Hydroxyzine HCl


  (Atarax)  25 mg  1X  PRN


 PO


 ITCHING  2/4/21 19:15


    2/4/21 19:12





 


 Zolpidem Tartrate


  (Ambien)  5 mg  PRN QHS  PRN


 PO


 INSOMNIA, MAY REPEAT IN 1HR  2/4/21 20:15


    2/4/21 20:36














Justifications for Admission


Other Justification


ACS











FELICITY WHITLOCK MD         Feb 5, 2021 07:32

## 2021-02-05 NOTE — NUR
Pt in bed assessment completed, Pt very angry stated he do not have time to answer any 
questions, poc explained pt stated he will only be taking certain meds tonight will resume 
care and continue to monitor pt.

## 2021-02-06 VITALS — DIASTOLIC BLOOD PRESSURE: 62 MMHG | SYSTOLIC BLOOD PRESSURE: 99 MMHG

## 2021-02-06 VITALS — DIASTOLIC BLOOD PRESSURE: 48 MMHG | SYSTOLIC BLOOD PRESSURE: 101 MMHG

## 2021-02-06 VITALS — SYSTOLIC BLOOD PRESSURE: 114 MMHG | DIASTOLIC BLOOD PRESSURE: 51 MMHG

## 2021-02-06 VITALS — SYSTOLIC BLOOD PRESSURE: 95 MMHG | DIASTOLIC BLOOD PRESSURE: 64 MMHG

## 2021-02-06 VITALS — DIASTOLIC BLOOD PRESSURE: 61 MMHG | SYSTOLIC BLOOD PRESSURE: 101 MMHG

## 2021-02-06 VITALS — SYSTOLIC BLOOD PRESSURE: 81 MMHG | DIASTOLIC BLOOD PRESSURE: 44 MMHG

## 2021-02-06 VITALS — SYSTOLIC BLOOD PRESSURE: 95 MMHG | DIASTOLIC BLOOD PRESSURE: 42 MMHG

## 2021-02-06 LAB
ANION GAP SERPL CALC-SCNC: 11 MMOL/L (ref 6–14)
BASOPHILS # BLD AUTO: 0.1 X10^3/UL (ref 0–0.2)
BASOPHILS NFR BLD: 2 % (ref 0–3)
BUN SERPL-MCNC: 9 MG/DL (ref 8–26)
CALCIUM SERPL-MCNC: 8.5 MG/DL (ref 8.5–10.1)
CHLORIDE SERPL-SCNC: 99 MMOL/L (ref 98–107)
CO2 SERPL-SCNC: 24 MMOL/L (ref 21–32)
CREAT SERPL-MCNC: 1 MG/DL (ref 0.7–1.3)
EOSINOPHIL NFR BLD: 0.3 X10^3/UL (ref 0–0.7)
EOSINOPHIL NFR BLD: 7 % (ref 0–3)
ERYTHROCYTE [DISTWIDTH] IN BLOOD BY AUTOMATED COUNT: 18.1 % (ref 11.5–14.5)
GFR SERPLBLD BASED ON 1.73 SQ M-ARVRAT: 76 ML/MIN
GLUCOSE SERPL-MCNC: 118 MG/DL (ref 70–99)
HCT VFR BLD CALC: 31.8 % (ref 39–53)
HGB BLD-MCNC: 10.3 G/DL (ref 13–17.5)
LYMPHOCYTES # BLD: 1.1 X10^3/UL (ref 1–4.8)
LYMPHOCYTES NFR BLD AUTO: 25 % (ref 24–48)
MCH RBC QN AUTO: 25 PG (ref 25–35)
MCHC RBC AUTO-ENTMCNC: 33 G/DL (ref 31–37)
MCV RBC AUTO: 76 FL (ref 79–100)
MONO #: 0.7 X10^3/UL (ref 0–1.1)
MONOCYTES NFR BLD: 16 % (ref 0–9)
NEUT #: 2.1 X10^3/UL (ref 1.8–7.7)
NEUTROPHILS NFR BLD AUTO: 50 % (ref 31–73)
PLATELET # BLD AUTO: 247 X10^3/UL (ref 140–400)
POTASSIUM SERPL-SCNC: 3.8 MMOL/L (ref 3.5–5.1)
RBC # BLD AUTO: 4.21 X10^6/UL (ref 4.3–5.7)
SODIUM SERPL-SCNC: 134 MMOL/L (ref 136–145)
WBC # BLD AUTO: 4.2 X10^3/UL (ref 4–11)

## 2021-02-06 RX ADMIN — MORPHINE SULFATE PRN MG: 2 INJECTION, SOLUTION INTRAMUSCULAR; INTRAVENOUS at 03:29

## 2021-02-06 RX ADMIN — HEPARIN SODIUM SCH UNIT: 5000 INJECTION, SOLUTION INTRAVENOUS; SUBCUTANEOUS at 21:45

## 2021-02-06 RX ADMIN — NITROGLYCERIN SCH INCH: 20 OINTMENT TOPICAL at 18:00

## 2021-02-06 RX ADMIN — CLOPIDOGREL BISULFATE SCH MG: 75 TABLET ORAL at 08:11

## 2021-02-06 RX ADMIN — IPRATROPIUM BROMIDE SCH MG: 0.5 SOLUTION RESPIRATORY (INHALATION) at 11:46

## 2021-02-06 RX ADMIN — LIDOCAINE SCH PATCH: 50 PATCH CUTANEOUS at 22:15

## 2021-02-06 RX ADMIN — ZOLPIDEM TARTRATE PRN MG: 5 TABLET ORAL at 22:24

## 2021-02-06 RX ADMIN — MORPHINE SULFATE PRN MG: 2 INJECTION, SOLUTION INTRAMUSCULAR; INTRAVENOUS at 08:13

## 2021-02-06 RX ADMIN — ZOLPIDEM TARTRATE PRN MG: 5 TABLET ORAL at 21:44

## 2021-02-06 RX ADMIN — IPRATROPIUM BROMIDE SCH MG: 0.5 SOLUTION RESPIRATORY (INHALATION) at 07:53

## 2021-02-06 RX ADMIN — PREGABALIN SCH MG: 75 CAPSULE ORAL at 08:12

## 2021-02-06 RX ADMIN — HEPARIN SODIUM SCH UNIT: 5000 INJECTION, SOLUTION INTRAVENOUS; SUBCUTANEOUS at 06:00

## 2021-02-06 RX ADMIN — METOPROLOL SUCCINATE SCH MG: 25 TABLET, EXTENDED RELEASE ORAL at 12:18

## 2021-02-06 RX ADMIN — PREGABALIN SCH MG: 75 CAPSULE ORAL at 21:44

## 2021-02-06 RX ADMIN — NITROGLYCERIN SCH INCH: 20 OINTMENT TOPICAL at 06:00

## 2021-02-06 RX ADMIN — ATORVASTATIN CALCIUM SCH MG: 40 TABLET, FILM COATED ORAL at 21:44

## 2021-02-06 RX ADMIN — HEPARIN SODIUM SCH UNIT: 5000 INJECTION, SOLUTION INTRAVENOUS; SUBCUTANEOUS at 14:00

## 2021-02-06 RX ADMIN — ASPIRIN 81 MG SCH MG: 81 TABLET ORAL at 08:12

## 2021-02-06 RX ADMIN — PSYLLIUM HUSK SCH PKT: 3.4 POWDER ORAL at 21:00

## 2021-02-06 RX ADMIN — NITROGLYCERIN SCH INCH: 20 OINTMENT TOPICAL at 12:00

## 2021-02-06 NOTE — PDOC
[employer] PROGRESS NOTE


Date of Service


DOS:


DATE: 2/6/21 


TIME: 12:50





Chief Complaint


Chief Complaint


A/P:


Chest pain - high risk ACS given his history and lateral TWI, will trend 

troponins, telemetry


Acute on chronic CHF - left ventricular systolic dysfunction with ejection 

fraction estimated at 20 to 25%


Hypokalemia - will replace. Check mag level


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

tamine use actively.  Fluid dynamics and CXR seem to indicate acute CHF, will 

initiate IV lasix


CAD - s/p x4 cardiac catherizations. multiple stents in the past. At least 12 

stents reported per patient - previously placed stents in the left anterior 

descending artery, diagonal branch and left posterior descending artery.  The 

obtuse marginal branch and nondominant right coronary artery showed 100% chronic

total occlusions, described in prior cardiac catheterization. (Cath report from 

10/2020)


Mitral regurgitation


h/o VT s/p AICD - medtronic. No events


HTN - cont meds


HLD - cont meds


Active IV meth use - used prior to admission. Tox screen pending and sees a 

[facility] he is nondistended nicotine patch states he 

will need to start smoking as soon as he leaves


Anxiety/agitation -calmed with verbal counseling, haldol. Prn zyprexa ordered


History of Drug seeking behavior -advised with his substance abuse history is 

high risk and should not be given an opioid prescription on discharge and to 

minimize opioids while inpatient


Anemia - likely from chronic substance abuse, will need monitoring outpatient. 

Check iron levels





FEN - Cardiac


PPX - Lovenox


FULL CODE


Dispo - inpatient for high risk ACS





History of Present Illness


History of Present Illness


Mr Bishop is a 61yo M w/ PMHx CAD x multiple GENNY, CHF, HTN, HLD, VT s/p AICD, 

COPD/asthma, GERD, OA, anxiety, IV methamphetamine abuse who comes directly from

his "meth house" to the ED with complaint of sharp chest pain with radiation to 

his right neck.  Patient is well-known to our service with multiple admissions 

with similar complaints. Notes the pain on 2/4/21 was unusual because his chest 

pain only radiates to the left but this time radiated to the right.  States his 

pain is constant but fluctuating in severity, 79/10. Had a recent left heart 

catheterization on 10/9/2020 showing patent previously placed stents in the left

anterior descending artery, diagonal branch and left posterior descending 

artery, the obtuse marginal branch and nondominant right coronary artery showed 

100% chronic total occlusions, severe left ventricular systolic dysfunction with

ejection fraction estimated at 20 to 25% with 2+ mitral regurgitation.  He was 

recommended optimization of medical therapy for coronary disease and ischemic 

cardiomyopathy.  He denies any associated leg swelling.  He is currently 

requesting food, stating he has not eaten in 4 days because he has been doing 

methamphetamine IV in both arms.





EKG with lateral TWI and leftward axis, no STEMI. CXR with interstitial findings

concerning for acute CHF


Troponin <0.017, BNP 1179.


Historically with multiple PCIs- [facility] at [facility] Jan 2018 showed showed patent

mid LAD stent, patent 1st diagonal stent, total chronic occlusion of first 

obtuse marginal with territory infarct but no indication for PCI, patent stent 

in left AV groove artery, patent stent in LPDA, non-dominant 100 % stenosis of 

proximal RCA. Seen by cardiology here in January.


Admit for further work-up of his coronary artery disease





2/5: BP low. Telemetry with tachycardia multiple PVCs.  Still short of breath 

still with pain.  He notes he still can be 2 to 6 weeks until he is able to get 

into methamphetamine rehab and he has been injecting for the last 4 days and is 

"starving". Pain relieved with NTG, but only temporarily.





BP still low. HR decreased. Less short of breath, requesting every 4 hours IV 

morphine. Very verbally abusive to staff, apologetic after. Notes he may be able

to get into rehab on 2/11/2021 and his daughter will take him into her custody 

for personal safety on 2/9/2021. Still with right shoulder pain.





Plan:


Nitropaste if BP allows


Topical voltaren


Daily BMP, mag


Inhaled ipratropium





Vitals/I&O


Vitals/I&O:





                                   Vital Signs








  Date Time  Temp Pulse Resp B/P (MAP) Pulse Ox O2 Delivery O2 Flow Rate FiO2


 


2/6/21 12:18  90  114/51    


 


2/6/21 10:30 98.0  16  96 Room Air  





 98.0       














                                    I & O   


 


 2/5/21 2/5/21 2/6/21





 15:00 23:00 07:00


 


Intake Total 720 ml 240 ml 480 ml


 


Balance 720 ml 240 ml 480 ml











Physical Exam


General:  Alert, Oriented X3, Cooperative, moderate distress


Heart:  Regular rate, Normal S1, Normal S2


Lungs:  Wheezing, Crackles





Labs


Labs:





Laboratory Tests








Test


 2/6/21


08:20


 


White Blood Count


 4.2 x10^3/uL


(4.0-11.0)


 


Red Blood Count


 4.21 x10^6/uL


(4.30-5.70)


 


Hemoglobin


 10.3 g/dL


(13.0-17.5)


 


Hematocrit


 31.8 %


(39.0-53.0)


 


Mean Corpuscular Volume 76 fL () 


 


Mean Corpuscular Hemoglobin 25 pg (25-35) 


 


Mean Corpuscular Hemoglobin


Concent 33 g/dL


(31-37)


 


Red Cell Distribution Width


 18.1 %


(11.5-14.5)


 


Platelet Count


 247 x10^3/uL


(140-400)


 


Neutrophils (%) (Auto) 50 % (31-73) 


 


Lymphocytes (%) (Auto) 25 % (24-48) 


 


Monocytes (%) (Auto) 16 % (0-9) 


 


Eosinophils (%) (Auto) 7 % (0-3) 


 


Basophils (%) (Auto) 2 % (0-3) 


 


Neutrophils # (Auto)


 2.1 x10^3/uL


(1.8-7.7)


 


Lymphocytes # (Auto)


 1.1 x10^3/uL


(1.0-4.8)


 


Monocytes # (Auto)


 0.7 x10^3/uL


(0.0-1.1)


 


Eosinophils # (Auto)


 0.3 x10^3/uL


(0.0-0.7)


 


Basophils # (Auto)


 0.1 x10^3/uL


(0.0-0.2)


 


Sodium Level


 134 mmol/L


(136-145)


 


Potassium Level


 3.8 mmol/L


(3.5-5.1)


 


Chloride Level


 99 mmol/L


()


 


Carbon Dioxide Level


 24 mmol/L


(21-32)


 


Anion Gap 11 (6-14) 


 


Blood Urea Nitrogen 9 mg/dL (8-26) 


 


Creatinine


 1.0 mg/dL


(0.7-1.3)


 


Estimated GFR


(Cockcroft-Gault) 76.0 





 


Glucose Level


 118 mg/dL


(70-99)


 


Calcium Level


 8.5 mg/dL


(8.5-10.1)











Assessment and Plan


Assessmemt and Plan


Problems


Medical Problems:


(1) Chest pain


Status: Acute  





(2) Patient requests medication, not given


Status: Acute  





(3) Request for narcotic pain medication


Status: Acute  











Comment


Review of Relevant


I have reviewed the following items luke (where applicable) has been applied.


Medications:





Current Medications








 Medications


  (Trade)  Dose


 Ordered  Sig/Gwen


 Route


 PRN Reason  Start Time


 Stop Time Status Last Admin


Dose Admin


 


 Aspirin


  (Aspirin


 Chewable)  81 mg  DAILY


 PO


   2/5/21 13:00


    2/6/21 08:12





 


 Clopidogrel


 Bisulfate


  (Plavix)  75 mg  DAILY


 PO


   2/5/21 13:00


    2/6/21 08:11





 


 Metoprolol


 Succinate


  (Toprol Xl)  12.5 mg  DAILY


 PO


   2/5/21 13:00


    2/6/21 12:18





 


 Pregabalin


  (Lyrica)  300 mg  BID


 PO


   2/5/21 21:00


    2/6/21 08:12





 


 Atorvastatin


 Calcium


  (Lipitor)  80 mg  QHS


 PO


   2/5/21 21:00


    2/5/21 20:55





 


 Ipratropium


 Bromide


  (Atrovent)  0.5 mg  RTQID


 NEB


   2/5/21 13:00


    2/6/21 07:53





 


 Furosemide


  (Lasix)  40 mg  1X  ONCE


 IVP


   2/5/21 13:00


 2/5/21 13:01 DC 2/5/21 14:30














Justifications for Admission


Other Justification


ACS











FELICITY WHITLOCK MD         Feb 6, 2021 13:00

## 2021-02-07 VITALS — DIASTOLIC BLOOD PRESSURE: 73 MMHG | SYSTOLIC BLOOD PRESSURE: 127 MMHG

## 2021-02-07 VITALS — SYSTOLIC BLOOD PRESSURE: 102 MMHG | DIASTOLIC BLOOD PRESSURE: 60 MMHG

## 2021-02-07 VITALS — SYSTOLIC BLOOD PRESSURE: 81 MMHG | DIASTOLIC BLOOD PRESSURE: 56 MMHG

## 2021-02-07 VITALS — SYSTOLIC BLOOD PRESSURE: 108 MMHG | DIASTOLIC BLOOD PRESSURE: 67 MMHG

## 2021-02-07 VITALS — SYSTOLIC BLOOD PRESSURE: 119 MMHG | DIASTOLIC BLOOD PRESSURE: 80 MMHG

## 2021-02-07 RX ADMIN — ASPIRIN 81 MG SCH MG: 81 TABLET ORAL at 08:25

## 2021-02-07 RX ADMIN — CLOPIDOGREL BISULFATE SCH MG: 75 TABLET ORAL at 08:25

## 2021-02-07 RX ADMIN — ATORVASTATIN CALCIUM SCH MG: 40 TABLET, FILM COATED ORAL at 20:16

## 2021-02-07 RX ADMIN — ZOLPIDEM TARTRATE PRN MG: 5 TABLET ORAL at 22:43

## 2021-02-07 RX ADMIN — HEPARIN SODIUM SCH UNIT: 5000 INJECTION, SOLUTION INTRAVENOUS; SUBCUTANEOUS at 05:09

## 2021-02-07 RX ADMIN — LIDOCAINE SCH PATCH: 50 PATCH CUTANEOUS at 08:26

## 2021-02-07 RX ADMIN — HEPARIN SODIUM SCH UNIT: 5000 INJECTION, SOLUTION INTRAVENOUS; SUBCUTANEOUS at 08:27

## 2021-02-07 RX ADMIN — PSYLLIUM HUSK SCH PKT: 3.4 POWDER ORAL at 19:20

## 2021-02-07 RX ADMIN — PREGABALIN SCH MG: 75 CAPSULE ORAL at 08:29

## 2021-02-07 RX ADMIN — METOPROLOL SUCCINATE SCH MG: 25 TABLET, EXTENDED RELEASE ORAL at 08:25

## 2021-02-07 RX ADMIN — NITROGLYCERIN SCH INCH: 20 OINTMENT TOPICAL at 05:10

## 2021-02-07 RX ADMIN — NITROGLYCERIN SCH INCH: 20 OINTMENT TOPICAL at 18:00

## 2021-02-07 RX ADMIN — HEPARIN SODIUM SCH UNIT: 5000 INJECTION, SOLUTION INTRAVENOUS; SUBCUTANEOUS at 22:00

## 2021-02-07 RX ADMIN — PREGABALIN SCH MG: 75 CAPSULE ORAL at 20:16

## 2021-02-07 RX ADMIN — ZOLPIDEM TARTRATE PRN MG: 5 TABLET ORAL at 20:16

## 2021-02-07 RX ADMIN — NITROGLYCERIN SCH INCH: 20 OINTMENT TOPICAL at 00:00

## 2021-02-07 RX ADMIN — Medication SCH EA: at 19:19

## 2021-02-07 RX ADMIN — NITROGLYCERIN SCH INCH: 20 OINTMENT TOPICAL at 22:45

## 2021-02-07 RX ADMIN — NITROGLYCERIN SCH INCH: 20 OINTMENT TOPICAL at 08:26

## 2021-02-07 NOTE — PDOC
[employer] PROGRESS NOTE


Date of Service


DOS:


DATE: 2/7/21 


TIME: 07:55





Chief Complaint


Chief Complaint


A/P:


Chest pain - high risk ACS given his history and lateral TWI, will trend 

troponins, telemetry


Acute on chronic CHF - left ventricular systolic dysfunction with ejection 

fraction estimated at 20 to 25%


Hypokalemia - will replace. Check mag level


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

tamine use actively.  Fluid dynamics and CXR seem to indicate acute CHF, will 

initiate IV lasix


CAD - s/p x4 cardiac catherizations. multiple stents in the past. At least 12 

stents reported per patient - previously placed stents in the left anterior 

descending artery, diagonal branch and left posterior descending artery.  The 

obtuse marginal branch and nondominant right coronary artery showed 100% chronic

total occlusions, described in prior cardiac catheterization. (Cath report from 

10/2020)


Mitral regurgitation


h/o VT s/p AICD - medtronic. No events


HTN - cont meds


HLD - cont meds


Active IV meth use - used prior to admission. Tox screen pending and sees a 

counselor through [facility]


Tobaccoism -counseled on cessation he is nondistended nicotine patch states he 

will need to start smoking as soon as he leaves


Labile mood with psychotic features and Anxiety/agitation - calmed with verbal 

counseling, haldol. Prn zyprexa ordered. Likely he is undiagnosed Bipolar or 

schizoaffective


History of Drug seeking behavior -advised with his substance abuse history is 

high risk and should not be given an opioid prescription on discharge and to 

minimize opioids


Anemia - likely from chronic substance abuse, will need monitoring outpatient.


Hyponatremia - likely nutritional and hypervolemic, will monitor





FEN - Cardiac


PPX - Lovenox


FULL CODE


Dispo - inpatient for high risk ACS





History of Present Illness


History of Present Illness


Mr Bishop is a 59yo M w/ PMHx CAD x multiple GENNY, CHF, HTN, HLD, VT s/p AICD, 

COPD/asthma, GERD, OA, anxiety, IV methamphetamine abuse who comes directly from

his "meth house" to the ED with complaint of sharp chest pain with radiation to 

his right neck.  Patient is well-known to our service with multiple admissions 

with similar complaints. Notes the pain on 2/4/21 was unusual because his chest 

pain only radiates to the left but this time radiated to the right.  States his 

pain is constant but fluctuating in severity, 79/10. Had a recent left heart 

catheterization on 10/9/2020 showing patent previously placed stents in the left

anterior descending artery, diagonal branch and left posterior descending 

artery, the obtuse marginal branch and nondominant right coronary artery showed 

100% chronic total occlusions, severe left ventricular systolic dysfunction with

ejection fraction estimated at 20 to 25% with 2+ mitral regurgitation.  He was 

recommended optimization of medical therapy for coronary disease and ischemic 

cardiomyopathy.  He denies any associated leg swelling.  He is currently 

requesting food, stating he has not eaten in 4 days because he has been doing 

methamphetamine IV in both arms.





EKG with lateral TWI and leftward axis, no STEMI. CXR with interstitial findings

concerning for acute CHF


Troponin <0.017, BNP 1179.


Historically with multiple PCIs- [facility] Jan 2018 showed showed patent

mid LAD stent, patent 1st diagonal stent, total chronic occlusion of first 

obtuse marginal with territory infarct but no indication for PCI, patent stent 

in left AV groove artery, patent stent in LPDA, non-dominant 100 % stenosis of 

proximal RCA. Seen by cardiology here in January.


Admit for further work-up of his coronary artery disease





2/5: BP low. Telemetry with tachycardia multiple PVCs.  Still short of breath 

still with pain.  He notes he still can be 2 to 6 weeks until he is able to get 

into methamphetamine rehab and he has been injecting for the last 4 days and is 

"starving". Pain relieved with NTG, but only temporarily.


2/6: BP low. HR decreased. Less short of breath, requesting IV morphine. Very 

verbally abusive to staff, apologetic after. Notes he may be able to get into 

rehab on 2/11/2021 and his daughter will take him into her custody for personal 

safety on 2/9/2021. Still with right shoulder pain.





Pain overnight. Afebrile. No O2 requirements. Still difficult to redirect until 

he received zyprexa, feels this helps his mood significantly.





Plan:


Nitropaste if BP allows


Topical voltaren


Daily BMP, mag


Continue mood stabilizing agents, scheduling may help him





Vitals/I&O


Vitals/I&O:





                                   Vital Signs








  Date Time  Temp Pulse Resp B/P (MAP) Pulse Ox O2 Delivery O2 Flow Rate FiO2


 


2/7/21 06:34 97.9 60 19 108/67 (81) 99 Room Air  





 97.9       














                                    I & O   


 


 2/6/21 2/6/21 2/7/21





 15:00 23:00 07:00


 


Intake Total 720 ml 390 ml 720 ml


 


Balance 720 ml 390 ml 720 ml











Physical Exam


General:  Alert, Oriented X3, Cooperative, moderate distress


Heart:  Regular rate, Normal S1, Normal S2


Lungs:  Wheezing, Crackles





Labs


Labs:





Laboratory Tests








Test


 2/6/21


08:20


 


White Blood Count


 4.2 x10^3/uL


(4.0-11.0)


 


Red Blood Count


 4.21 x10^6/uL


(4.30-5.70)


 


Hemoglobin


 10.3 g/dL


(13.0-17.5)


 


Hematocrit


 31.8 %


(39.0-53.0)


 


Mean Corpuscular Volume 76 fL () 


 


Mean Corpuscular Hemoglobin 25 pg (25-35) 


 


Mean Corpuscular Hemoglobin


Concent 33 g/dL


(31-37)


 


Red Cell Distribution Width


 18.1 %


(11.5-14.5)


 


Platelet Count


 247 x10^3/uL


(140-400)


 


Neutrophils (%) (Auto) 50 % (31-73) 


 


Lymphocytes (%) (Auto) 25 % (24-48) 


 


Monocytes (%) (Auto) 16 % (0-9) 


 


Eosinophils (%) (Auto) 7 % (0-3) 


 


Basophils (%) (Auto) 2 % (0-3) 


 


Neutrophils # (Auto)


 2.1 x10^3/uL


(1.8-7.7)


 


Lymphocytes # (Auto)


 1.1 x10^3/uL


(1.0-4.8)


 


Monocytes # (Auto)


 0.7 x10^3/uL


(0.0-1.1)


 


Eosinophils # (Auto)


 0.3 x10^3/uL


(0.0-0.7)


 


Basophils # (Auto)


 0.1 x10^3/uL


(0.0-0.2)


 


Sodium Level


 134 mmol/L


(136-145)


 


Potassium Level


 3.8 mmol/L


(3.5-5.1)


 


Chloride Level


 99 mmol/L


()


 


Carbon Dioxide Level


 24 mmol/L


(21-32)


 


Anion Gap 11 (6-14) 


 


Blood Urea Nitrogen 9 mg/dL (8-26) 


 


Creatinine


 1.0 mg/dL


(0.7-1.3)


 


Estimated GFR


(Cockcroft-Gault) 76.0 





 


Glucose Level


 118 mg/dL


(70-99)


 


Calcium Level


 8.5 mg/dL


(8.5-10.1)


 


Magnesium Level


 2.0 mg/dL


(1.8-2.4)











Assessment and Plan


Assessmemt and Plan


Problems


Medical Problems:


(1) Chest pain


Status: Acute  





(2) Patient requests medication, not given


Status: Acute  





(3) Request for narcotic pain medication


Status: Acute  











Comment


Review of Relevant


I have reviewed the following items luke (where applicable) has been applied.


Medications:





Current Medications








 Medications


  (Trade)  Dose


 Ordered  Sig/Gwen


 Route


 PRN Reason  Start Time


 Stop Time Status Last Admin


Dose Admin


 


 Tramadol HCl


  (Ultram)  50 mg  PRN Q6HRS  PRN


 PO


 PAIN  2/6/21 13:00


    2/6/21 16:22





 


 Potassium


 Bicarbonate


  (Potassium


 Effervescent


 Tablet)  20 meq  1X  ONCE


 PO


   2/6/21 13:00


 2/6/21 13:01 DC 2/6/21 13:19














Justifications for Admission


Other Justification


ACS











FELICITY WHITLOCK MD         Feb 7, 2021 07:56

## 2021-02-08 VITALS — DIASTOLIC BLOOD PRESSURE: 54 MMHG | SYSTOLIC BLOOD PRESSURE: 96 MMHG

## 2021-02-08 VITALS — SYSTOLIC BLOOD PRESSURE: 100 MMHG | DIASTOLIC BLOOD PRESSURE: 49 MMHG

## 2021-02-08 VITALS — SYSTOLIC BLOOD PRESSURE: 116 MMHG | DIASTOLIC BLOOD PRESSURE: 63 MMHG

## 2021-02-08 VITALS — DIASTOLIC BLOOD PRESSURE: 71 MMHG | SYSTOLIC BLOOD PRESSURE: 118 MMHG

## 2021-02-08 LAB
ANION GAP SERPL CALC-SCNC: 9 MMOL/L (ref 6–14)
BASOPHILS # BLD AUTO: 0.1 X10^3/UL (ref 0–0.2)
BASOPHILS NFR BLD: 2 % (ref 0–3)
BUN SERPL-MCNC: 8 MG/DL (ref 8–26)
CALCIUM SERPL-MCNC: 8.5 MG/DL (ref 8.5–10.1)
CHLORIDE SERPL-SCNC: 105 MMOL/L (ref 98–107)
CO2 SERPL-SCNC: 24 MMOL/L (ref 21–32)
CREAT SERPL-MCNC: 0.9 MG/DL (ref 0.7–1.3)
EOSINOPHIL NFR BLD: 0.4 X10^3/UL (ref 0–0.7)
EOSINOPHIL NFR BLD: 8 % (ref 0–3)
ERYTHROCYTE [DISTWIDTH] IN BLOOD BY AUTOMATED COUNT: 18.1 % (ref 11.5–14.5)
GFR SERPLBLD BASED ON 1.73 SQ M-ARVRAT: 85.8 ML/MIN
GLUCOSE SERPL-MCNC: 95 MG/DL (ref 70–99)
HCT VFR BLD CALC: 30.6 % (ref 39–53)
HGB BLD-MCNC: 9.8 G/DL (ref 13–17.5)
LYMPHOCYTES # BLD: 1.1 X10^3/UL (ref 1–4.8)
LYMPHOCYTES NFR BLD AUTO: 23 % (ref 24–48)
MCH RBC QN AUTO: 25 PG (ref 25–35)
MCHC RBC AUTO-ENTMCNC: 32 G/DL (ref 31–37)
MCV RBC AUTO: 76 FL (ref 79–100)
MONO #: 0.7 X10^3/UL (ref 0–1.1)
MONOCYTES NFR BLD: 14 % (ref 0–9)
NEUT #: 2.6 X10^3/UL (ref 1.8–7.7)
NEUTROPHILS NFR BLD AUTO: 53 % (ref 31–73)
PLATELET # BLD AUTO: 231 X10^3/UL (ref 140–400)
POTASSIUM SERPL-SCNC: 4.1 MMOL/L (ref 3.5–5.1)
RBC # BLD AUTO: 4 X10^6/UL (ref 4.3–5.7)
SODIUM SERPL-SCNC: 138 MMOL/L (ref 136–145)
WBC # BLD AUTO: 4.8 X10^3/UL (ref 4–11)

## 2021-02-08 RX ADMIN — LIDOCAINE SCH PATCH: 50 PATCH CUTANEOUS at 09:00

## 2021-02-08 RX ADMIN — PREGABALIN SCH MG: 75 CAPSULE ORAL at 09:31

## 2021-02-08 RX ADMIN — Medication SCH EA: at 21:00

## 2021-02-08 RX ADMIN — ASPIRIN 81 MG SCH MG: 81 TABLET ORAL at 09:30

## 2021-02-08 RX ADMIN — NITROGLYCERIN SCH INCH: 20 OINTMENT TOPICAL at 18:00

## 2021-02-08 RX ADMIN — HEPARIN SODIUM SCH UNIT: 5000 INJECTION, SOLUTION INTRAVENOUS; SUBCUTANEOUS at 21:11

## 2021-02-08 RX ADMIN — PREGABALIN SCH MG: 75 CAPSULE ORAL at 21:08

## 2021-02-08 RX ADMIN — ATORVASTATIN CALCIUM SCH MG: 40 TABLET, FILM COATED ORAL at 21:08

## 2021-02-08 RX ADMIN — NITROGLYCERIN SCH INCH: 20 OINTMENT TOPICAL at 12:00

## 2021-02-08 RX ADMIN — METOPROLOL SUCCINATE SCH MG: 25 TABLET, EXTENDED RELEASE ORAL at 09:31

## 2021-02-08 RX ADMIN — CLOPIDOGREL BISULFATE SCH MG: 75 TABLET ORAL at 09:30

## 2021-02-08 RX ADMIN — PSYLLIUM HUSK SCH PKT: 3.4 POWDER ORAL at 21:00

## 2021-02-08 RX ADMIN — NITROGLYCERIN SCH INCH: 20 OINTMENT TOPICAL at 04:41

## 2021-02-08 RX ADMIN — HEPARIN SODIUM SCH UNIT: 5000 INJECTION, SOLUTION INTRAVENOUS; SUBCUTANEOUS at 04:40

## 2021-02-08 RX ADMIN — HEPARIN SODIUM SCH UNIT: 5000 INJECTION, SOLUTION INTRAVENOUS; SUBCUTANEOUS at 14:00

## 2021-02-08 RX ADMIN — ZOLPIDEM TARTRATE PRN MG: 5 TABLET ORAL at 21:11

## 2021-02-08 NOTE — NUR
Pt in bed asleep assessment completed poc explained , pt discharged and awaiting cab  for 
ride home. Will resume care and continue to monitor pt.

## 2021-02-08 NOTE — NUR
RN NOTE

patient received discharge paperwork but refused discharge teaching. [employer] called multiple 
times for transport still waiting on taxi to get in the area. Nursing supervisor notified. 
Patients IV discontinued and monitor removed. Patient updated with travel plans.

## 2021-02-08 NOTE — NUR
Cab company unavailable until 2100 to pick pt up, pt destination closed at 2100 pt unable to 
await in cold.Pt will be dc in a.m. call placed to [facility] regarding pt unable to be dc 
tonight.

## 2021-02-08 NOTE — NUR
RN NOTE

this RN took over care for this patient and agrees with previous RNs assessments and notes. 
patient resting in bed with call light within reach.

## 2021-02-08 NOTE — NUR
SS following up with discharge planning. SS reviewed pt chart and discussed with pt RN. Pt 
is from home and is currently on room air. Discharge order on the chart for home with self 
care. SS met with pt and discussed discharge. Pt demanding SS contact [facility], 
[phone], and request transport to home. SS made request to [employer]. Trip ID#[id number]. 
Pt reported that he has no other way home and no vehicle in the [facility] area. [facility] 
reported that they would arrange transportation within a three hour window. Pt's RN 
notified. SS will continue to follow for discharge planning.

## 2021-02-09 RX ADMIN — NITROGLYCERIN SCH INCH: 20 OINTMENT TOPICAL at 05:59

## 2021-02-09 RX ADMIN — HEPARIN SODIUM SCH UNIT: 5000 INJECTION, SOLUTION INTRAVENOUS; SUBCUTANEOUS at 05:59

## 2021-02-09 RX ADMIN — METOPROLOL SUCCINATE SCH MG: 25 TABLET, EXTENDED RELEASE ORAL at 08:13

## 2021-02-09 RX ADMIN — NITROGLYCERIN SCH INCH: 20 OINTMENT TOPICAL at 00:00

## 2021-02-09 RX ADMIN — ASPIRIN 81 MG SCH MG: 81 TABLET ORAL at 08:09

## 2021-02-09 RX ADMIN — CLOPIDOGREL BISULFATE SCH MG: 75 TABLET ORAL at 08:10

## 2021-02-09 RX ADMIN — PREGABALIN SCH MG: 75 CAPSULE ORAL at 08:10

## 2021-02-09 RX ADMIN — METOPROLOL SUCCINATE SCH MG: 25 TABLET, EXTENDED RELEASE ORAL at 08:10

## 2021-02-09 RX ADMIN — LIDOCAINE SCH PATCH: 50 PATCH CUTANEOUS at 08:12

## 2021-02-09 NOTE — DS
DATE OF DISCHARGE:  02/09/2021



ADMISSION DIAGNOSIS:  Chest pain.



DISCHARGE DIAGNOSES:  Resolving chest pain, history of methamphetamine abuse.



HOSPITAL COURSE:  The patient is a pleasant middle-aged male, who presented with

chest pain.  We admitted the patient, did serial enzymes, serial EKGs.  We

consulted Cardiology.  His workup was negative.  We discharged to home.



DISPOSITION:  Home.



ACTIVITY:  As tolerated.



DIET:  Low sodium.



MEDICATIONS:  Please see MRAD.



TOTAL TIME:  33 minutes.

 



______________________________

LIANG GIL DO



DR:  INÉS/juarez  JOB#:  [id number] / [id number]

DD:  02/09/2021 11:58  DT:  02/09/2021 12:33

## 2021-02-09 NOTE — NUR
Discharge Note:



LEFTY RIOS



Discharge instructions and discharge home medications reviewed with Patient and a copy 
given. All questions have been answered and understanding verbalized. 



The following instructions and handouts were given: refused all discharge paperwork 



Discontinued lines and drains: Peripheral IV intact.



Patient discharged to Home or Self Care with Self via [facility] 



Patient took cab to [employer] to ride the train to his house.

## 2021-04-16 ENCOUNTER — HOSPITAL ENCOUNTER (EMERGENCY)
Dept: HOSPITAL 61 - ER | Age: 61
LOS: 1 days | Discharge: HOME | End: 2021-04-17
Payer: MEDICAID

## 2021-04-16 VITALS — HEIGHT: 67 IN | BODY MASS INDEX: 23.56 KG/M2 | WEIGHT: 150.13 LBS

## 2021-04-16 DIAGNOSIS — Z98.890: ICD-10-CM

## 2021-04-16 DIAGNOSIS — R07.89: Primary | ICD-10-CM

## 2021-04-16 DIAGNOSIS — J45.909: ICD-10-CM

## 2021-04-16 DIAGNOSIS — Z95.0: ICD-10-CM

## 2021-04-16 DIAGNOSIS — I25.2: ICD-10-CM

## 2021-04-16 DIAGNOSIS — E78.00: ICD-10-CM

## 2021-04-16 DIAGNOSIS — I50.9: ICD-10-CM

## 2021-04-16 DIAGNOSIS — F19.90: ICD-10-CM

## 2021-04-16 DIAGNOSIS — Z90.89: ICD-10-CM

## 2021-04-16 DIAGNOSIS — I11.0: ICD-10-CM

## 2021-04-16 DIAGNOSIS — F17.200: ICD-10-CM

## 2021-04-16 LAB
ALBUMIN SERPL-MCNC: 3.3 G/DL (ref 3.4–5)
ALBUMIN/GLOB SERPL: 1 {RATIO} (ref 1–1.7)
ALP SERPL-CCNC: 85 U/L (ref 46–116)
ALT SERPL-CCNC: 21 U/L (ref 16–63)
AMPHETAMINE/METHAMPHETAMINE: (no result)
ANION GAP SERPL CALC-SCNC: 7 MMOL/L (ref 6–14)
ANISOCYTOSIS BLD QL SMEAR: SLIGHT
APTT PPP: YELLOW S
AST SERPL-CCNC: 13 U/L (ref 15–37)
BACTERIA #/AREA URNS HPF: 0 /HPF
BARBITURATES UR-MCNC: (no result) UG/ML
BASOPHILS # BLD AUTO: 0.1 X10^3/UL (ref 0–0.2)
BASOPHILS NFR BLD: 1 % (ref 0–3)
BENZODIAZ UR-MCNC: (no result) UG/L
BILIRUB SERPL-MCNC: 0.2 MG/DL (ref 0.2–1)
BILIRUB UR QL STRIP: NEGATIVE
BUN SERPL-MCNC: 12 MG/DL (ref 8–26)
BUN/CREAT SERPL: 12 (ref 6–20)
CALCIUM SERPL-MCNC: 9 MG/DL (ref 8.5–10.1)
CANNABINOIDS UR-MCNC: (no result) UG/L
CHLORIDE SERPL-SCNC: 107 MMOL/L (ref 98–107)
CO2 SERPL-SCNC: 27 MMOL/L (ref 21–32)
COCAINE UR-MCNC: (no result) NG/ML
CREAT SERPL-MCNC: 1 MG/DL (ref 0.7–1.3)
EOSINOPHIL NFR BLD: 0.2 X10^3/UL (ref 0–0.7)
EOSINOPHIL NFR BLD: 5 % (ref 0–3)
ERYTHROCYTE [DISTWIDTH] IN BLOOD BY AUTOMATED COUNT: 20.4 % (ref 11.5–14.5)
FIBRINOGEN PPP-MCNC: CLEAR MG/DL
GFR SERPLBLD BASED ON 1.73 SQ M-ARVRAT: 76 ML/MIN
GLUCOSE SERPL-MCNC: 94 MG/DL (ref 70–99)
HCT VFR BLD CALC: 35.3 % (ref 39–53)
HGB BLD-MCNC: 11.6 G/DL (ref 13–17.5)
LIPASE: 190 U/L (ref 73–393)
LYMPHOCYTES # BLD: 1.1 X10^3/UL (ref 1–4.8)
LYMPHOCYTES NFR BLD AUTO: 22 % (ref 24–48)
MACROCYTES BLD QL SMEAR: SLIGHT
MAGNESIUM SERPL-MCNC: 1.9 MG/DL (ref 1.8–2.4)
MCH RBC QN AUTO: 27 PG (ref 25–35)
MCHC RBC AUTO-ENTMCNC: 33 G/DL (ref 31–37)
MCV RBC AUTO: 83 FL (ref 79–100)
METHADONE SERPL-MCNC: (no result) NG/ML
MONO #: 0.5 X10^3/UL (ref 0–1.1)
MONOCYTES NFR BLD: 11 % (ref 0–9)
NEUT #: 2.8 X10^3/UL (ref 1.8–7.7)
NEUTROPHILS NFR BLD AUTO: 60 % (ref 31–73)
NITRITE UR QL STRIP: NEGATIVE
OPIATES UR-MCNC: (no result) NG/ML
PCP SERPL-MCNC: (no result) MG/DL
PH UR STRIP: 7 [PH]
PLATELET # BLD AUTO: 240 X10^3/UL (ref 140–400)
PLATELET # BLD EST: ADEQUATE 10*3/UL
POTASSIUM SERPL-SCNC: 4.4 MMOL/L (ref 3.5–5.1)
PROT SERPL-MCNC: 6.6 G/DL (ref 6.4–8.2)
PROT UR STRIP-MCNC: NEGATIVE MG/DL
RBC # BLD AUTO: 4.28 X10^6/UL (ref 4.3–5.7)
RBC #/AREA URNS HPF: 0 /HPF (ref 0–2)
SODIUM SERPL-SCNC: 141 MMOL/L (ref 136–145)
UROBILINOGEN UR-MCNC: 0.2 MG/DL
WBC # BLD AUTO: 4.7 X10^3/UL (ref 4–11)
WBC #/AREA URNS HPF: 0 /HPF (ref 0–4)

## 2021-04-16 PROCEDURE — 85379 FIBRIN DEGRADATION QUANT: CPT

## 2021-04-16 PROCEDURE — 81001 URINALYSIS AUTO W/SCOPE: CPT

## 2021-04-16 PROCEDURE — 80053 COMPREHEN METABOLIC PANEL: CPT

## 2021-04-16 PROCEDURE — 84484 ASSAY OF TROPONIN QUANT: CPT

## 2021-04-16 PROCEDURE — 99285 EMERGENCY DEPT VISIT HI MDM: CPT

## 2021-04-16 PROCEDURE — 96374 THER/PROPH/DIAG INJ IV PUSH: CPT

## 2021-04-16 PROCEDURE — 83690 ASSAY OF LIPASE: CPT

## 2021-04-16 PROCEDURE — 93005 ELECTROCARDIOGRAM TRACING: CPT

## 2021-04-16 PROCEDURE — 36415 COLL VENOUS BLD VENIPUNCTURE: CPT

## 2021-04-16 PROCEDURE — 83880 ASSAY OF NATRIURETIC PEPTIDE: CPT

## 2021-04-16 PROCEDURE — 80307 DRUG TEST PRSMV CHEM ANLYZR: CPT

## 2021-04-16 PROCEDURE — 71045 X-RAY EXAM CHEST 1 VIEW: CPT

## 2021-04-16 PROCEDURE — 85025 COMPLETE CBC W/AUTO DIFF WBC: CPT

## 2021-04-16 PROCEDURE — 83735 ASSAY OF MAGNESIUM: CPT

## 2021-04-16 NOTE — EKG
[facility]

              [address] [place] [address]

Test Date:    2021               Test Time:    18:20:03

Pat Name:     JOB RIOS           Department:   

Patient ID:   [id number]           Room:          

Gender:       M                        Technician:   

:          1960               Requested By: AUDIE BURGER

Order Number: [id number]           Reading MD:     

                                 Measurements

Intervals                              Axis          

Rate:         101                      P:            47

MI:           112                      QRS:          -4

QRSD:         150                      T:            -150

QT:           384                                    

QTc:          499                                    

                           Interpretive Statements

SINUS TACHYCARDIA

VENTRICULAR PREMATURE COMPLEX(ES)

LEFT ATRIAL ABNORMALITY

LEFTWARD AXIS

NON SPECIFIC INTRAVENTRICULAR BLOCK

QRS(T) CONTOUR ABNORMALITY

CONSIDER ANTEROLATERAL MYOCARDIAL DAMAGE

ABNORMAL ECG

RI6.01

No previous ECG available for comparison

## 2021-04-16 NOTE — RAD
EXAM: CHEST 1 VIEW 



History: Chest pain 



COMPARISON: 2/4/2021



TECHNIQUE: Single portable radiograph of the chest



FINDINGS:  Mild cardiomegaly. Left-sided cardiac pacer AICD. Mild prominent bilateral interstitial jason
ng markings .



IMPRESSION:  

Mild prominent bilateral interstitial lung markings likely mild congestive changes or interstitial in
filtrates.



Electronically signed by: Soham Worley MD (4/16/2021 7:31 PM) [id number]

## 2021-04-16 NOTE — PHYS DOC
Past Medical History


Past Medical History:  Asthma, CAD, CHF, CVA, High Cholesterol, Hypertension, 

MI, Other


Additional Past Medical Histor:  VTACH,IV Drug abuse-Methamphetamine


Past Surgical History:  Angioplasty, Pacemaker, Other


Additional Past Surgical Histo:  15 cardiac stents, multiple cardiac cat

heterizations,WITH DEFIB


Smoking Status:  Current Every Day Smoker


Additional Information:  


0.5 PPD


Alcohol Use:  Rarely


Drug Use:  Methamphetamine


Social History Narrative:  PT REPORTS, "NO DRUG USE FOR 1 MONTH"





Adult General


Chief Complaint


Chief Complaint:  CHEST PAIN





HPI


HPI





Patient is a 61  year old male with a complicated past medical history which 

does include significant cardiovascular disease and stents but also significant 

for substance abuse and routine methamphetamine use now presenting emergency d

epartment complaint of new onset chest pain.  Patient states that just prior to 

arrival and new onset of chest pain that started in the left anterior chest 

rating to his neck and his left arm but now also radiating to the right arm.  

Patient states he is concerned because he never had radiation of the pain into 

the right side of the right arm before.  Denies any fever, chills, dizziness or 

lightheadedness.





Review of Systems


Review of Systems





Constitutional: Denies fever or chills []


Eyes: Denies change in visual acuity, redness, or eye pain []


HENT: Denies nasal congestion or sore throat []


Respiratory: Denies cough or shortness of breath []


Cardiovascular: No additional information not addressed in HPI []


GI: Denies abdominal pain, nausea, vomiting, bloody stools or diarrhea []


:  Denies dysuria or hematuria []


Musculoskeletal: Denies back pain or joint pain []


Integument: Denies rash or skin lesions []


Neurologic: Denies headache, focal weakness or sensory changes []


Endocrine: Denies polyuria or polydipsia []





All other systems were reviewed and found to be within normal limits, except as 

documented in this note.





Current Medications


Current Medications





Current Medications








 Medications


  (Trade)  Dose


 Ordered  Sig/Gwne  Start Time


 Stop Time Status Last Admin


Dose Admin


 


 Acetaminophen


  (Tylenol)  1,000 mg  1X  ONCE  4/16/21 20:00


 4/16/21 20:01 DC  





 


 Furosemide


  (Lasix)  40 mg  1X  ONCE  4/16/21 20:00


 4/16/21 20:01 DC 4/16/21 20:12


40 MG


 


 Ibuprofen


  (Motrin)  800 mg  1X  ONCE  4/16/21 20:00


 4/16/21 20:01 DC  














Allergies


Allergies





Allergies








Coded Allergies Type Severity Reaction Last Updated Verified


 


  acetaminophen Allergy Intermediate  10/8/20 Yes


 


  albuterol Adverse Reaction Intermediate  1/22/20 Yes


 


  ibuprofen Adverse Reaction Intermediate Nausea and Vomiting 1/22/20 Yes











Physical Exam


Physical Exam





Constitutional: Well developed, well nourished, no acute distress, non-toxic 

appearance. []


HENT: Normocephalic, atraumatic, bilateral external ears normal, oropharynx 

moist, no oral exudates, nose normal. []


Eyes: PERRLA, EOMI, conjunctiva normal, no discharge. [] 


Neck: Normal range of motion, no tenderness, supple, no stridor. [] 


Cardiovascular:Heart rate regular rhythm, no murmur []


Lungs & Thorax:  Bilateral breath sounds clear to auscultation []


Abdomen: Bowel sounds normal, soft, no tenderness, no masses, no pulsatile 

masses. [] 


Skin: Warm, dry, no erythema, no rash. [] 


Back: No tenderness, no CVA tenderness. [] 


Extremities: No tenderness, no cyanosis, no clubbing, ROM intact, no edema. [] 


Neurologic: Alert and oriented X 3, normal motor function, normal sensory 

function, no focal deficits noted. []


Psychologic: Affect normal, judgement normal, mood normal. []





Current Patient Data


Vital Signs





                                   Vital Signs








  Date Time  Temp Pulse Resp B/P (MAP) Pulse Ox O2 Delivery O2 Flow Rate FiO2


 


4/16/21 22:55  74 11 92/62 (72) 97 Room Air  


 


4/16/21 18:15 97.7       





 97.7       








Lab Values





                                Laboratory Tests








Test


 4/16/21


18:44 4/16/21


23:26 4/16/21


23:35


 


White Blood Count


 4.7 x10^3/uL


(4.0-11.0) 


 





 


Red Blood Count


 4.28 x10^6/uL


(4.30-5.70)  L 


 





 


Hemoglobin


 11.6 g/dL


(13.0-17.5)  L 


 





 


Hematocrit


 35.3 %


(39.0-53.0)  L 


 





 


Mean Corpuscular Volume


 83 fL ()


 


 





 


Mean Corpuscular Hemoglobin 27 pg (25-35)    


 


Mean Corpuscular Hemoglobin


Concent 33 g/dL


(31-37) 


 





 


Red Cell Distribution Width


 20.4 %


(11.5-14.5)  H 


 





 


Platelet Count


 240 x10^3/uL


(140-400) 


 





 


Neutrophils (%) (Auto) 60 % (31-73)    


 


Lymphocytes (%) (Auto) 22 % (24-48)  L  


 


Monocytes (%) (Auto) 11 % (0-9)  H  


 


Eosinophils (%) (Auto) 5 % (0-3)  H  


 


Basophils (%) (Auto) 1 % (0-3)    


 


Neutrophils # (Auto)


 2.8 x10^3/uL


(1.8-7.7) 


 





 


Lymphocytes # (Auto)


 1.1 x10^3/uL


(1.0-4.8) 


 





 


Monocytes # (Auto)


 0.5 x10^3/uL


(0.0-1.1) 


 





 


Eosinophils # (Auto)


 0.2 x10^3/uL


(0.0-0.7) 


 





 


Basophils # (Auto)


 0.1 x10^3/uL


(0.0-0.2) 


 





 


Platelet Estimate


 Adequate


(ADEQUATE) 


 





 


Anisocytosis Slight    


 


Macrocytosis Slight    


 


D-Dimer (Camryn)


 0.43 ug/mlFEU


(0.00-0.50) 


 





 


Sodium Level


 141 mmol/L


(136-145) 


 





 


Potassium Level


 4.4 mmol/L


(3.5-5.1) 


 





 


Chloride Level


 107 mmol/L


() 


 





 


Carbon Dioxide Level


 27 mmol/L


(21-32) 


 





 


Anion Gap 7 (6-14)    


 


Blood Urea Nitrogen


 12 mg/dL


(8-26) 


 





 


Creatinine


 1.0 mg/dL


(0.7-1.3) 


 





 


Estimated GFR


(Cockcroft-Gault) 76.0  


 


 





 


BUN/Creatinine Ratio 12 (6-20)    


 


Glucose Level


 94 mg/dL


(70-99) 


 





 


Calcium Level


 9.0 mg/dL


(8.5-10.1) 


 





 


Magnesium Level


 1.9 mg/dL


(1.8-2.4) 


 





 


Total Bilirubin


 0.2 mg/dL


(0.2-1.0) 


 





 


Aspartate Amino Transferase


(AST) 13 U/L (15-37)


L 


 





 


Alanine Aminotransferase (ALT)


 21 U/L (16-63)


 


 





 


Alkaline Phosphatase


 85 U/L


() 


 





 


Troponin I Quantitative


 < 0.017 ng/mL


(0.000-0.055) 


 < 0.017 ng/mL


(0.000-0.055)


 


NT-Pro-B-Type Natriuretic


Peptide 1399 pg/mL


(0-124)  H 


 





 


Total Protein


 6.6 g/dL


(6.4-8.2) 


 





 


Albumin


 3.3 g/dL


(3.4-5.0)  L 


 





 


Albumin/Globulin Ratio 1.0 (1.0-1.7)    


 


Lipase


 190 U/L


() 


 





 


Urine Collection Type  Unknown   


 


Urine Color  Yellow   


 


Urine Clarity  Clear   


 


Urine pH


 


 7.0 (<5.0-8.0)


 





 


Urine Specific Gravity


 


 <=1.005


(1.000-1.030) 





 


Urine Protein


 


 Negative mg/dL


(NEG-TRACE) 





 


Urine Glucose (UA)


 


 Negative mg/dL


(NEG) 





 


Urine Ketones (Stick)


 


 Negative mg/dL


(NEG) 





 


Urine Blood


 


 Negative (NEG)


 





 


Urine Nitrite


 


 Negative (NEG)


 





 


Urine Bilirubin


 


 Negative (NEG)


 





 


Urine Urobilinogen Dipstick


 


 0.2 mg/dL (0.2


mg/dL) 





 


Urine Leukocyte Esterase


 


 Negative (NEG)


 





 


Urine RBC  0 /HPF (0-2)   


 


Urine WBC  0 /HPF (0-4)   


 


Urine Squamous Epithelial


Cells 


 Occ /LPF  


 





 


Urine Bacteria


 


 0 /HPF (0-FEW)


 





 


Urine Opiates Screen  Neg (NEG)   


 


Urine Methadone Screen  Neg (NEG)   


 


Urine Barbiturates  Neg (NEG)   


 


Urine Phencyclidine Screen  Neg (NEG)   


 


Urine


Amphetamine/Methamphetamine 


 Neg (NEG)  


 





 


Urine Benzodiazepines Screen  Neg (NEG)   


 


Urine Cocaine Screen  Neg (NEG)   


 


Urine Cannabinoids Screen  Neg (NEG)   


 


Urine Ethyl Alcohol  Neg (NEG)   





                                Laboratory Tests


4/16/21 18:44








                                Laboratory Tests


4/16/21 18:44














EKG


EKG


[]





Radiology/Procedures


Radiology/Procedures


[]





Course & Med Decision Making


Course & Med Decision Making


Pertinent Labs and Imaging studies reviewed. (See chart for details)





61-year-old male presents emergency department for new onset of chest pain.  

Patient does have a significant cardiac history however he also has a 

significant history of agitation coming to the emergency department drug-seeking

 behavior.  No specific findings on physical exam.  Will obtain ACS work-up and 

reevaluate.





Patient now resting comfortably and did not receive any significant pain 

medication.  Patient refused nonnarcotic pain medications after initial 

evaluation.  Patient sleeping soundly without any pain.  Troponin negative x2 

and work-up otherwise negative.  At this time I feel the patient is safe for 

discharge home





Dragon Disclaimer


Dragon Disclaimer


This electronic medical record was generated, in whole or in part, using a voice

 recognition dictation system.





Departure


Departure


Impression:  


   Primary Impression:  


   Chest pain


Disposition:  01 HOME / SELF CARE / HOMELESS


Condition:  STABLE


Referrals:  


FELICITY WHITLOCK MD


Patient Instructions:  Chest Pain (Nonspecific)





Additional Instructions:  


EMERGENCY DEPARTMENT GENERAL DISCHARGE INSTRUCTIONS





Thank you for coming to [facility] Emergency Department (ED) 

today and 


trusting us with you care.  We trust that you had a positive experience in our 

Emergency 


Department.  If you wish to speak to the department management, you may call the

 Director at 


[phone].





YOUR FOLLOW UP INSTRUCTIONS ARE AS FOLLOWS:





1.  Do you have a private Doctor?  If you do not have a private doctor, please 

ask for a 


resource list of physicians or clinics that may be able to assist you with 

follow up care.





2.  The Emergency Physicain has interpreted your x-rays.  The X-Ray specialist 

will also 


review them.  If there is a change in the findings, you will be notified in 48 

hours when at 


all possible.





3.  A lab test or culture has been done, your results will be reviewed and you 

will be 


notified if you need a change in treatment.





ADDITIONAL INSTRUCTIONS AND INFORMATION:





1.  Your care today has been supervised by a physician who is specially trained 

in emergency 


care.  Many problems require more than one evaluation for a complete diagnosis 

and 


treatment.  We recommend that you schedule your follow up appointment as 

recommended to 


ensure complete treatment of you illness or injury.  If you are unable to obtain

 follow up 


care and continue to have a problem, or if your condition worsens, we recommend 

that you 


return to the ED.





2.  We are not able to safely determine your condition over the phone nor are we

 able to 


give sound medical advice over the phone.  For these safety reasons, if you call

 for medical 


advice we will ask you to come to the ED for further evaluation.





3.  If you have any questions regarding these discharge instructions please call

 the ED at 


[phone].





SAFETY INFORMATION:





In the interest of safety, wellness, and injury prevention; we encourage you to 

wear your 


sealbelt, if you smoke; quite smoking, and we encourage family to use a 

protective helmet 


for bicycling and other sporting events that present an increased risk for head 

injury.





IF YOUR SYMPTOMS WORSEN OR NEW SYMPTOMS DEVELOP, OR YOU HAVE CONCERNS ABOUT YOUR

 CONDITION; 


OR IF YOUR CONDITION WORSENS WHILE YOU ARE WAITING FOR YOUR FOLLOW UP 

APPOINTMENT; EITHER 


CONTACT YOUR PRIMARY CARE DOCTOR, THE PHYSICIAN WHOSE NAME AND NUMBER YOU WERE 

GIVEN, OR 


RETURN TO THE ED IMMEDIATELY.











AUDIE BURGER MD              Apr 16, 2021 20:21

## 2021-04-17 VITALS — SYSTOLIC BLOOD PRESSURE: 110 MMHG | DIASTOLIC BLOOD PRESSURE: 61 MMHG

## 2021-05-26 ENCOUNTER — HOSPITAL ENCOUNTER (OUTPATIENT)
Dept: HOSPITAL 61 - ER | Age: 61
Setting detail: OBSERVATION
LOS: 2 days | Discharge: HOME | End: 2021-05-28
Attending: FAMILY MEDICINE | Admitting: FAMILY MEDICINE
Payer: MEDICAID

## 2021-05-26 VITALS — DIASTOLIC BLOOD PRESSURE: 64 MMHG | SYSTOLIC BLOOD PRESSURE: 116 MMHG

## 2021-05-26 VITALS — BODY MASS INDEX: 22.21 KG/M2 | HEIGHT: 67 IN | WEIGHT: 141.54 LBS

## 2021-05-26 DIAGNOSIS — I11.0: ICD-10-CM

## 2021-05-26 DIAGNOSIS — K21.9: ICD-10-CM

## 2021-05-26 DIAGNOSIS — Z79.01: ICD-10-CM

## 2021-05-26 DIAGNOSIS — Z86.73: ICD-10-CM

## 2021-05-26 DIAGNOSIS — I25.2: ICD-10-CM

## 2021-05-26 DIAGNOSIS — E87.1: ICD-10-CM

## 2021-05-26 DIAGNOSIS — I34.0: ICD-10-CM

## 2021-05-26 DIAGNOSIS — J44.9: ICD-10-CM

## 2021-05-26 DIAGNOSIS — Z95.5: ICD-10-CM

## 2021-05-26 DIAGNOSIS — F17.210: ICD-10-CM

## 2021-05-26 DIAGNOSIS — D64.9: ICD-10-CM

## 2021-05-26 DIAGNOSIS — I25.5: ICD-10-CM

## 2021-05-26 DIAGNOSIS — E78.00: ICD-10-CM

## 2021-05-26 DIAGNOSIS — M19.90: ICD-10-CM

## 2021-05-26 DIAGNOSIS — I63.9: ICD-10-CM

## 2021-05-26 DIAGNOSIS — I25.10: ICD-10-CM

## 2021-05-26 DIAGNOSIS — E87.6: ICD-10-CM

## 2021-05-26 DIAGNOSIS — R07.89: Primary | ICD-10-CM

## 2021-05-26 DIAGNOSIS — Z79.82: ICD-10-CM

## 2021-05-26 DIAGNOSIS — F41.9: ICD-10-CM

## 2021-05-26 DIAGNOSIS — Z82.49: ICD-10-CM

## 2021-05-26 DIAGNOSIS — Z95.1: ICD-10-CM

## 2021-05-26 DIAGNOSIS — E78.5: ICD-10-CM

## 2021-05-26 DIAGNOSIS — F15.10: ICD-10-CM

## 2021-05-26 DIAGNOSIS — Z95.810: ICD-10-CM

## 2021-05-26 DIAGNOSIS — I50.22: ICD-10-CM

## 2021-05-26 LAB
AMPHETAMINE/METHAMPHETAMINE: (no result)
ANION GAP SERPL CALC-SCNC: 11 MMOL/L (ref 6–14)
BARBITURATES UR-MCNC: (no result) UG/ML
BASOPHILS # BLD AUTO: 0.1 X10^3/UL (ref 0–0.2)
BASOPHILS NFR BLD: 1 % (ref 0–3)
BENZODIAZ UR-MCNC: (no result) UG/L
BUN SERPL-MCNC: 13 MG/DL (ref 8–26)
CALCIUM SERPL-MCNC: 8.6 MG/DL (ref 8.5–10.1)
CANNABINOIDS UR-MCNC: (no result) UG/L
CHLORIDE SERPL-SCNC: 105 MMOL/L (ref 98–107)
CO2 SERPL-SCNC: 24 MMOL/L (ref 21–32)
COCAINE UR-MCNC: (no result) NG/ML
CREAT SERPL-MCNC: 1.1 MG/DL (ref 0.7–1.3)
EOSINOPHIL NFR BLD: 0.2 X10^3/UL (ref 0–0.7)
EOSINOPHIL NFR BLD: 3 % (ref 0–3)
ERYTHROCYTE [DISTWIDTH] IN BLOOD BY AUTOMATED COUNT: 19.4 % (ref 11.5–14.5)
GFR SERPLBLD BASED ON 1.73 SQ M-ARVRAT: 68.1 ML/MIN
GLUCOSE SERPL-MCNC: 86 MG/DL (ref 70–99)
HCT VFR BLD CALC: 34.2 % (ref 39–53)
HGB BLD-MCNC: 11.7 G/DL (ref 13–17.5)
LYMPHOCYTES # BLD: 1.1 X10^3/UL (ref 1–4.8)
LYMPHOCYTES NFR BLD AUTO: 16 % (ref 24–48)
MCH RBC QN AUTO: 29 PG (ref 25–35)
MCHC RBC AUTO-ENTMCNC: 34 G/DL (ref 31–37)
MCV RBC AUTO: 86 FL (ref 79–100)
METHADONE SERPL-MCNC: (no result) NG/ML
MONO #: 0.8 X10^3/UL (ref 0–1.1)
MONOCYTES NFR BLD: 11 % (ref 0–9)
NEUT #: 4.9 X10^3/UL (ref 1.8–7.7)
NEUTROPHILS NFR BLD AUTO: 69 % (ref 31–73)
OPIATES UR-MCNC: (no result) NG/ML
PCP SERPL-MCNC: (no result) MG/DL
PLATELET # BLD AUTO: 203 X10^3/UL (ref 140–400)
POTASSIUM SERPL-SCNC: 4.3 MMOL/L (ref 3.5–5.1)
RBC # BLD AUTO: 3.98 X10^6/UL (ref 4.3–5.7)
SODIUM SERPL-SCNC: 140 MMOL/L (ref 136–145)
WBC # BLD AUTO: 7.2 X10^3/UL (ref 4–11)

## 2021-05-26 PROCEDURE — 36415 COLL VENOUS BLD VENIPUNCTURE: CPT

## 2021-05-26 PROCEDURE — 83735 ASSAY OF MAGNESIUM: CPT

## 2021-05-26 PROCEDURE — 85025 COMPLETE CBC W/AUTO DIFF WBC: CPT

## 2021-05-26 PROCEDURE — 80048 BASIC METABOLIC PNL TOTAL CA: CPT

## 2021-05-26 PROCEDURE — 71046 X-RAY EXAM CHEST 2 VIEWS: CPT

## 2021-05-26 PROCEDURE — 96374 THER/PROPH/DIAG INJ IV PUSH: CPT

## 2021-05-26 PROCEDURE — G0238 OTH RESP PROC, INDIV: HCPCS

## 2021-05-26 PROCEDURE — 93005 ELECTROCARDIOGRAM TRACING: CPT

## 2021-05-26 PROCEDURE — 96375 TX/PRO/DX INJ NEW DRUG ADDON: CPT

## 2021-05-26 PROCEDURE — 96376 TX/PRO/DX INJ SAME DRUG ADON: CPT

## 2021-05-26 PROCEDURE — G0378 HOSPITAL OBSERVATION PER HR: HCPCS

## 2021-05-26 PROCEDURE — G0379 DIRECT REFER HOSPITAL OBSERV: HCPCS

## 2021-05-26 PROCEDURE — 80307 DRUG TEST PRSMV CHEM ANLYZR: CPT

## 2021-05-26 PROCEDURE — 99285 EMERGENCY DEPT VISIT HI MDM: CPT

## 2021-05-26 PROCEDURE — 83550 IRON BINDING TEST: CPT

## 2021-05-26 PROCEDURE — 83540 ASSAY OF IRON: CPT

## 2021-05-26 PROCEDURE — 87040 BLOOD CULTURE FOR BACTERIA: CPT

## 2021-05-26 PROCEDURE — 94667 MNPJ CHEST WALL 1ST: CPT

## 2021-05-26 PROCEDURE — 84484 ASSAY OF TROPONIN QUANT: CPT

## 2021-05-26 RX ADMIN — MORPHINE SULFATE PRN MG: 2 INJECTION, SOLUTION INTRAMUSCULAR; INTRAVENOUS at 23:34

## 2021-05-26 NOTE — ED.ADGEN
Past Medical History


Past Medical History:  Asthma, CAD, CHF, CVA, High Cholesterol, Hypertension, 

MI, Other


Additional Past Medical Histor:  VTACH,IV Drug abuse-Methamphetamine


Past Surgical History:  Angioplasty, Pacemaker, Other


Additional Past Surgical Histo:  15 cardiac stents, multiple cardiac cat

heterizations,WITH DEFIB


Smoking Status:  Current Every Day Smoker


Alcohol Use:  Rarely


Drug Use:  Methamphetamine


Social History Narrative:  STOPPED IV METH 2 MONTHS AGO





General Adult


EDM:


Chief Complaint:  CHEST PAIN-CARDIAC NATURE





HPI:


HPI:


Patient is 61-year-old male who presents to the emergency room complaining of 

left-sided chest pain.  Patient has a long cardiac history and is well-known to 

this emergency room.  He does have a history of methamphetamine abuse as well as

drug-seeking behavior in the emergency room.  Patient states that he developed 

this sharp chest pain on the left side of his chest that radiates into his s

houlder and down his arm.  He states this comes in waves and will go up to a 10 

for about 30 seconds and then ease back down to about a 6.  He states this is 

different than a encounter chest pain he is ever had before.  He denies any kind

of chest pressure which he has had before.  He states that he took 3 nitro and 

aspirin prior to arrival and this did not help with his pain.  He states he has 

been taking his medications as prescribed.  He states that he was not exerting 

himself when his pain started.  He is requesting narcotic pain medicine.





Review of Systems:


Review of Systems:


Complete ROS is negative unless otherwise documented in HPI





Current Medications:





Current Medications








 Medications


  (Trade)  Dose


 Ordered  Sig/[facility]  Start Time


 Stop Time Status Last Admin


Dose Admin


 


 Aspirin


  (Aspirin


 Chewable)  324 mg  1X  ONCE  5/26/21 19:15


 5/26/21 19:16 DC  





 


 Methocarbamol


  (Robaxin)  750 mg  1X  ONCE  5/26/21 19:45


 5/26/21 19:46 DC 5/26/21 19:25


750 MG


 


 Nitroglycerin


  (Nitrostat)  0.4 mg  PRN Q5MIN  PRN  5/26/21 19:15


     














Allergies:


Allergies:





Allergies








Coded Allergies Type Severity Reaction Last Updated Verified


 


  acetaminophen Allergy Intermediate  10/8/20 Yes


 


  albuterol Adverse Reaction Intermediate  1/22/20 Yes


 


  ibuprofen Adverse Reaction Intermediate Nausea and Vomiting 1/22/20 Yes











Physical Exam:


PE:


General: Awake, alert, NAD. Well Nourished, well hydrated.  Fidgeting


HEENT: Atraumatic, EOMI, PERRL, airway patent, moist oral mucosa


Neck: Supple, trachea midline


Respiratory: CTA bilaterally, normal effort, no wheezing/crackles


CV: RRR, no murmur, cap refill <2


GI: Soft, nondistended, nontender, no masses


MSK: No obvious deformities


Skin: Warm, dry, intact


Neuro: A&O x3, speech NL, sensory and motor grossly intact, no focal deficits


Psych: Pressured speech, normal mood, not suicidal or homicidal





Current Patient Data:


Labs:





                                Laboratory Tests








Test


 5/26/21


18:57


 


White Blood Count


 7.2 x10^3/uL


(4.0-11.0)


 


Red Blood Count


 3.98 x10^6/uL


(4.30-5.70)  L


 


Hemoglobin


 11.7 g/dL


(13.0-17.5)  L


 


Hematocrit


 34.2 %


(39.0-53.0)  L


 


Mean Corpuscular Volume


 86 fL ()





 


Mean Corpuscular Hemoglobin 29 pg (25-35)  


 


Mean Corpuscular Hemoglobin


Concent 34 g/dL


(31-37)


 


Red Cell Distribution Width


 19.4 %


(11.5-14.5)  H


 


Platelet Count


 203 x10^3/uL


(140-400)


 


Neutrophils (%) (Auto) 69 % (31-73)  


 


Lymphocytes (%) (Auto) 16 % (24-48)  L


 


Monocytes (%) (Auto) 11 % (0-9)  H


 


Eosinophils (%) (Auto) 3 % (0-3)  


 


Basophils (%) (Auto) 1 % (0-3)  


 


Neutrophils # (Auto)


 4.9 x10^3/uL


(1.8-7.7)


 


Lymphocytes # (Auto)


 1.1 x10^3/uL


(1.0-4.8)


 


Monocytes # (Auto)


 0.8 x10^3/uL


(0.0-1.1)


 


Eosinophils # (Auto)


 0.2 x10^3/uL


(0.0-0.7)


 


Basophils # (Auto)


 0.1 x10^3/uL


(0.0-0.2)


 


Sodium Level


 140 mmol/L


(136-145)


 


Potassium Level


 4.3 mmol/L


(3.5-5.1)


 


Chloride Level


 105 mmol/L


()


 


Carbon Dioxide Level


 24 mmol/L


(21-32)


 


Anion Gap 11 (6-14)  


 


Blood Urea Nitrogen


 13 mg/dL


(8-26)


 


Creatinine


 1.1 mg/dL


(0.7-1.3)


 


Estimated GFR


(Cockcroft-Gault) 68.1  





 


Glucose Level


 86 mg/dL


(70-99)


 


Calcium Level


 8.6 mg/dL


(8.5-10.1)


 


Troponin I Quantitative


 < 0.017 ng/mL


(0.000-0.055)





                                Laboratory Tests


5/26/21 18:57








                                Laboratory Tests


5/26/21 18:57











Vital Signs:





                                   Vital Signs








  Date Time  Temp Pulse Resp B/P (MAP) Pulse Ox O2 Delivery O2 Flow Rate FiO2


 


5/26/21 20:23  72  104/62 (76) 95 Room Air  


 


5/26/21 18:49 98.7  22     





 98.7       











EKG:


EKG:


[]





Heart Score:


C/O Chest Pain:  Yes


HEART Score for Chest Pain:  








HEART Score for Chest Pain Response (Comments) Value


 


History Moderately Suspicious 1


 


ECG Significant ST Depression 2


 


Age >45 - < 65 1


 


Risk Factors >3 Risk Factors or Hx CAD 2


 


Troponin < Normal Limit 0


 


Total  6








Risk Factors:


Risk Factors:  DM, Current or recent (<one month) smoker, HTN, HLP, family 

history of CAD, obesity.


Risk Scores:


Score 0 - 3:  2.5% MACE over next 6 weeks - Discharge Home


Score 4 - 6:  20.3% MACE over next 6 weeks - Admit for Clinical Observation


Score 7 - 10:  72.7% MACE over next 6 weeks - Early Invasive Strategies





Radiology/Procedures:


Radiology/Procedures:


[]





Course & Med Decision Making:


Course & Med Decision Making


Pertinent Labs and Imaging studies reviewed. (See chart for details)





Patient is a 61 year-old male who presents to the Emergency Room complaining of 

chest pain. History is significant for coronary artery disease, methamphetamine 

abuse. At this time, given patient's risk factors and story there is concern for

 possible cardiac pathology. EKG was ordered and shows PVCs, intraventricular 

junctional rhythm. At this time there is no signs of STEMI, pericarditis, or 

unstable arrthymia on EKG.  Patient has received aspirin today. CBC, BMP, 

troponin, CXR were ordered to evaluate for causes of chest pain including ACS, 

anemia, electrolyte abnormalities that can lead to arrhythmias, PTX, pneumonia, 

pneumomediastinum. Patient does not have any abdominal tenderness that would 

suggest pancreaititis or cholecystitis and does not need an abdominal work up at

 this time. Patient's HEART score is 6 placing the patient at moderate risk.  

Patient did become sad about not receiving pain medicine.  He did agree to take 

Toradol.  On reevaluation after Toradol and Robaxin patient was sleeping 

comfortably.





Dannielle Disclaimer:


Dragon Disclaimer:


This electronic medical record was generated, in whole or in part, using a voice

 recognition dictation system.





Departure


Departure


Impression:  


   Primary Impression:  


   Chest pain


Disposition:  09 ADMITTED AS INPATIENT


Condition:  STABLE


Referrals:  


NO PCP (PCP)











ELIE BUCKLEY MD            May 26, 2021 19:28

## 2021-05-26 NOTE — RAD
EXAM:  XR CHEST 2V 5/26/2021 7:34 PM



CLINICAL INDICATION:  Chest pain



COMPARISON:  Chest radiograph 4/16/2021



TECHNIQUE:  PA and lateral views of the chest



FINDINGS:  A pacemaker/AICD is unchanged. Cardiomegaly is unchanged. There are coronary artery stents
. The lungs are well-expanded and clear. No pleural effusion or pneumothorax. No pulmonary edema. The
re is no acute osseous abnormality.



IMPRESSION:  Unchanged cardiomegaly. No acute abnormality.



 



Electronically signed by: Madelyn Ryan MD (5/26/2021 7:49 PM) [id number]

## 2021-05-27 VITALS — SYSTOLIC BLOOD PRESSURE: 110 MMHG | DIASTOLIC BLOOD PRESSURE: 57 MMHG

## 2021-05-27 VITALS — SYSTOLIC BLOOD PRESSURE: 103 MMHG | DIASTOLIC BLOOD PRESSURE: 64 MMHG

## 2021-05-27 VITALS — SYSTOLIC BLOOD PRESSURE: 95 MMHG | DIASTOLIC BLOOD PRESSURE: 48 MMHG

## 2021-05-27 VITALS — DIASTOLIC BLOOD PRESSURE: 51 MMHG | SYSTOLIC BLOOD PRESSURE: 98 MMHG

## 2021-05-27 VITALS — SYSTOLIC BLOOD PRESSURE: 132 MMHG | DIASTOLIC BLOOD PRESSURE: 74 MMHG

## 2021-05-27 VITALS — SYSTOLIC BLOOD PRESSURE: 113 MMHG | DIASTOLIC BLOOD PRESSURE: 49 MMHG

## 2021-05-27 LAB
ANION GAP SERPL CALC-SCNC: 11 MMOL/L (ref 6–14)
BASOPHILS # BLD AUTO: 0.1 X10^3/UL (ref 0–0.2)
BASOPHILS NFR BLD: 2 % (ref 0–3)
BUN SERPL-MCNC: 17 MG/DL (ref 8–26)
CALCIUM SERPL-MCNC: 8.6 MG/DL (ref 8.5–10.1)
CHLORIDE SERPL-SCNC: 104 MMOL/L (ref 98–107)
CO2 SERPL-SCNC: 24 MMOL/L (ref 21–32)
CREAT SERPL-MCNC: 1.1 MG/DL (ref 0.7–1.3)
EOSINOPHIL NFR BLD: 0.3 X10^3/UL (ref 0–0.7)
EOSINOPHIL NFR BLD: 6 % (ref 0–3)
ERYTHROCYTE [DISTWIDTH] IN BLOOD BY AUTOMATED COUNT: 19.6 % (ref 11.5–14.5)
GFR SERPLBLD BASED ON 1.73 SQ M-ARVRAT: 68.1 ML/MIN
GLUCOSE SERPL-MCNC: 113 MG/DL (ref 70–99)
HCT VFR BLD CALC: 36.4 % (ref 39–53)
HGB BLD-MCNC: 12.1 G/DL (ref 13–17.5)
LYMPHOCYTES # BLD: 1.1 X10^3/UL (ref 1–4.8)
LYMPHOCYTES NFR BLD AUTO: 24 % (ref 24–48)
MCH RBC QN AUTO: 29 PG (ref 25–35)
MCHC RBC AUTO-ENTMCNC: 33 G/DL (ref 31–37)
MCV RBC AUTO: 87 FL (ref 79–100)
MONO #: 0.7 X10^3/UL (ref 0–1.1)
MONOCYTES NFR BLD: 16 % (ref 0–9)
NEUT #: 2.4 X10^3/UL (ref 1.8–7.7)
NEUTROPHILS NFR BLD AUTO: 52 % (ref 31–73)
PLATELET # BLD AUTO: 190 X10^3/UL (ref 140–400)
POTASSIUM SERPL-SCNC: 5 MMOL/L (ref 3.5–5.1)
RBC # BLD AUTO: 4.16 X10^6/UL (ref 4.3–5.7)
SODIUM SERPL-SCNC: 139 MMOL/L (ref 136–145)
WBC # BLD AUTO: 4.7 X10^3/UL (ref 4–11)

## 2021-05-27 RX ADMIN — MORPHINE SULFATE PRN MG: 2 INJECTION, SOLUTION INTRAMUSCULAR; INTRAVENOUS at 04:28

## 2021-05-27 RX ADMIN — PREGABALIN SCH MG: 75 CAPSULE ORAL at 12:10

## 2021-05-27 RX ADMIN — MORPHINE SULFATE PRN MG: 2 INJECTION, SOLUTION INTRAMUSCULAR; INTRAVENOUS at 08:42

## 2021-05-27 RX ADMIN — ASPIRIN 81 MG SCH MG: 81 TABLET ORAL at 10:17

## 2021-05-27 RX ADMIN — MORPHINE SULFATE PRN MG: 2 INJECTION, SOLUTION INTRAMUSCULAR; INTRAVENOUS at 17:32

## 2021-05-27 RX ADMIN — HEPARIN SODIUM SCH UNIT: 5000 INJECTION, SOLUTION INTRAVENOUS; SUBCUTANEOUS at 14:00

## 2021-05-27 RX ADMIN — PREGABALIN SCH MG: 75 CAPSULE ORAL at 21:32

## 2021-05-27 RX ADMIN — MORPHINE SULFATE PRN MG: 2 INJECTION, SOLUTION INTRAMUSCULAR; INTRAVENOUS at 12:45

## 2021-05-27 RX ADMIN — CLOPIDOGREL BISULFATE SCH MG: 75 TABLET ORAL at 10:18

## 2021-05-27 RX ADMIN — MORPHINE SULFATE PRN MG: 2 INJECTION, SOLUTION INTRAMUSCULAR; INTRAVENOUS at 21:34

## 2021-05-27 RX ADMIN — HEPARIN SODIUM SCH UNIT: 5000 INJECTION, SOLUTION INTRAVENOUS; SUBCUTANEOUS at 21:30

## 2021-05-27 RX ADMIN — METOPROLOL SUCCINATE SCH MG: 25 TABLET, EXTENDED RELEASE ORAL at 10:18

## 2021-05-27 NOTE — EKG
[facility]

              [address] [place] [address]

Test Date:    2021               Test Time:    18:51:36

Pat Name:     JOB RIOS           Department:   

Patient ID:   [id number]           Room:          

Gender:       M                        Technician:   

:          1960               Requested By: ELIE BUCKLEY

Order Number: [id number]           Reading MD:     

                                 Measurements

Intervals                              Axis          

Rate:         94                       P:            20

AZ:           102                      QRS:          17

QRSD:         156                      T:            -157

QT:           406                                    

QTc:          514                                    

                           Interpretive Statements

SINUS RHYTHM

COMPLEX(ES) WITH ABERRANT INTRAVENTRICULAR CONDUCTION

VENTRICULAR PREMATURE COMPLEX(ES)

LEFT ATRIAL ABNORMALITY

CONSIDER WPW, TYPE B

ST & T ABNORMALITY, CONSIDER

INFEROLATERAL ISCHEMIA OR LEFT VENTRICULAR STRAIN

ABNORMAL ECG

RI6.01

Compared to ECG 2021 18:49:53

T-wave abnormality now present

Possible ischemia now present

## 2021-05-27 NOTE — PDOC2
BARBARA CHARLES APRN 5/27/21 1105:


CARDIAC CONSULT


DATE OF CONSULT


Date of Consult


DATE: 5/27/21 


TIME: 10:55





REASON FOR CONSULT


Reason for Consult:


Chest pain





REFERRING PHYSICIAN


Referring Physician:


[id number]





SOURCE


Source:  Chart review, Patient





HISTORY OF PRESENT ILLNESS


HISTORY OF PRESENT ILLNESS


This is a 60 yo male who presented secondary to chest pain. Patient has had 

multiple admission in past for chest pain. Underwent cardiac cath last fall the 

showed patent stents and known . No lesions needing intervention were noted. 

Also h/o methamphetamine abuse and drug seeking behavior. UDS negative for meth 

this admission 


Patient reports sharp, shooting pain in his left chest and down his left arm. La

sts 20-30 seconds at a time and occurs a couple of times every house. No 

associated dizziness, diaphoresis, palpitations, SOA, or nausea/vomiting. D/w 

Dr. Barrett who saw that patient a [facility] earlier this year. Patient underwent 

cardiac cath at that time without intervention.





PAST MEDICAL HISTORY


Past Medical History


Cardiovascular:  CAD, CHF (ICM), HTN, Hyperlipidemia, Other (VT)


Pulmonary:  Asthma


CENTRAL NERVOUS SYSTEM:  CVA


GI:  GERD


Heme/Onc:  No pertinent hx


Hepatobiliary:  No pertinent hx


Psych:  Anxiety


Musculoskeletal:  Osteoarthritis


Rheumatologic:  No pertinent hx


Infectious disease:  No pertinent hx


ENT:  No pertinent hx


Renal/:  No pertinent hx


Endocrine:  No pertinent hx


Dermatology:  No pertinent hx





PAST SURGICAL HISTORY


Past Surgical History


Pacemaker (AICD), Other (multiple PCI/stents.





FAMILY HISTORY


Family History


 Hypertension





SOCIAL HISTORY


Social History


Smoke:  <1 pack per day


ALCOHOL:  none


Drugs:  H/o meth use; reports clean x2 months


Lives:  with Family





CURRENT MEDICATIONS


CURRENT MEDICATIONS





Current Medications








 Medications


  (Trade)  Dose


 Ordered  Sig/Gwen


 Route


 PRN Reason  Start Time


 Stop Time Status Last Admin


Dose Admin


 


 Methocarbamol


  (Robaxin)  750 mg  1X  ONCE


 PO


   5/26/21 19:45


 5/26/21 19:46 DC 5/26/21 19:25





 


 Ketorolac


 Tromethamine


  (Toradol 30mg


 Vial)  30 mg  1X  ONCE


 IVP


   5/26/21 21:45


 5/26/21 21:46 DC 5/26/21 21:41





 


 Morphine Sulfate


  (Morphine


 Sulfate)  2 mg  PRN Q4HRS  PRN


 IV


 PAIN  5/26/21 23:30


    5/27/21 08:42





 


 Aspirin


  (Aspirin


 Chewable)  81 mg  DAILY


 PO


   5/27/21 11:00


    5/27/21 10:17





 


 Clopidogrel


 Bisulfate


  (Plavix)  75 mg  DAILY


 PO


   5/27/21 11:00


    5/27/21 10:18





 


 Metoprolol


 Succinate


  (Toprol Xl)  12.5 mg  DAILY


 PO


   5/27/21 11:00


    5/27/21 10:18





 


 Olanzapine


  (ZyPREXA)  5 mg  BID


 PO


   5/27/21 11:00


    5/27/21 10:17














ALLERGIES


ALLERGIES:  


Coded Allergies:  


     acetaminophen (Verified  Allergy, Intermediate, 10/8/20)


     albuterol (Verified  Adverse Reaction, Intermediate, 1/22/20)


   Patient states "it speeds my heart up too much"


     ibuprofen (Verified  Adverse Reaction, Intermediate, Nausea and Vomiting, 

1/22/20)





ROS


Review of System


14 point ROS conducted with pertinent positives noted above in HPI





PHYSICAL EXAM


PHYSICAL EXAM


General:  Alert, Oriented X3, Cooperative, No acute distress


HEENT:  Atraumatic, Mucous membr. moist/pink


Lungs:  Clear to auscultation, Normal air movement


Heart:  Regular rate (SR), Normal S1, Normal S2, No murmurs


Abdomen:  Soft, No tenderness


Extremities:  No cyanosis, No edema


Skin:  No breakdown, No significant lesion


Neuro:  Normal speech, Sensation intact


Psych/Mental Status:  Mental status NL, Mood NL


MUSCULOSKELETAL:  Osteoarthritic changes both hands





VITALS/I&O


VITALS/I&O:





                                   Vital Signs








  Date Time  Temp Pulse Resp B/P (MAP) Pulse Ox O2 Delivery O2 Flow Rate FiO2


 


5/27/21 10:18  69  110/57    


 


5/27/21 09:12   18  99 Room Air  


 


5/27/21 07:04 97.9       





 97.9       














                                    I & O   


 


 5/26/21 5/26/21 5/27/21





 15:00 23:00 07:00


 


Intake Total   500 ml


 


Output Total   0 ml


 


Balance   500 ml











LABS


Lab:





                                Laboratory Tests








Test


 5/26/21


18:57 5/26/21


21:23 5/27/21


08:20


 


White Blood Count


 7.2 x10^3/uL


(4.0-11.0) 


 4.7 x10^3/uL


(4.0-11.0)


 


Red Blood Count


 3.98 x10^6/uL


(4.30-5.70)  L 


 4.16 x10^6/uL


(4.30-5.70)  L


 


Hemoglobin


 11.7 g/dL


(13.0-17.5)  L 


 12.1 g/dL


(13.0-17.5)  L


 


Hematocrit


 34.2 %


(39.0-53.0)  L 


 36.4 %


(39.0-53.0)  L


 


Mean Corpuscular Volume


 86 fL ()


 


 87 fL ()





 


Mean Corpuscular Hemoglobin 29 pg (25-35)    29 pg (25-35)  


 


Mean Corpuscular Hemoglobin


Concent 34 g/dL


(31-37) 


 33 g/dL


(31-37)


 


Red Cell Distribution Width


 19.4 %


(11.5-14.5)  H 


 19.6 %


(11.5-14.5)  H


 


Platelet Count


 203 x10^3/uL


(140-400) 


 190 x10^3/uL


(140-400)


 


Neutrophils (%) (Auto) 69 % (31-73)    52 % (31-73)  


 


Lymphocytes (%) (Auto) 16 % (24-48)  L  24 % (24-48)  


 


Monocytes (%) (Auto) 11 % (0-9)  H  16 % (0-9)  H


 


Eosinophils (%) (Auto) 3 % (0-3)    6 % (0-3)  H


 


Basophils (%) (Auto) 1 % (0-3)    2 % (0-3)  


 


Neutrophils # (Auto)


 4.9 x10^3/uL


(1.8-7.7) 


 2.4 x10^3/uL


(1.8-7.7)


 


Lymphocytes # (Auto)


 1.1 x10^3/uL


(1.0-4.8) 


 1.1 x10^3/uL


(1.0-4.8)


 


Monocytes # (Auto)


 0.8 x10^3/uL


(0.0-1.1) 


 0.7 x10^3/uL


(0.0-1.1)


 


Eosinophils # (Auto)


 0.2 x10^3/uL


(0.0-0.7) 


 0.3 x10^3/uL


(0.0-0.7)


 


Basophils # (Auto)


 0.1 x10^3/uL


(0.0-0.2) 


 0.1 x10^3/uL


(0.0-0.2)


 


Sodium Level


 140 mmol/L


(136-145) 


 139 mmol/L


(136-145)


 


Potassium Level


 4.3 mmol/L


(3.5-5.1) 


 5.0 mmol/L


(3.5-5.1)


 


Chloride Level


 105 mmol/L


() 


 104 mmol/L


()


 


Carbon Dioxide Level


 24 mmol/L


(21-32) 


 24 mmol/L


(21-32)


 


Anion Gap 11 (6-14)    11 (6-14)  


 


Blood Urea Nitrogen


 13 mg/dL


(8-26) 


 17 mg/dL


(8-26)


 


Creatinine


 1.1 mg/dL


(0.7-1.3) 


 1.1 mg/dL


(0.7-1.3)


 


Estimated GFR


(Cockcroft-Gault) 68.1  


 


 68.1  





 


Glucose Level


 86 mg/dL


(70-99) 


 113 mg/dL


(70-99)  H


 


Calcium Level


 8.6 mg/dL


(8.5-10.1) 


 8.6 mg/dL


(8.5-10.1)


 


Troponin I Quantitative


 < 0.017 ng/mL


(0.000-0.055) 


 < 0.017 ng/mL


(0.000-0.055)


 


Urine Opiates Screen  Pos (NEG)   


 


Urine Methadone Screen  Neg (NEG)   


 


Urine Barbiturates  Neg (NEG)   


 


Urine Phencyclidine Screen  Neg (NEG)   


 


Urine


Amphetamine/Methamphetamine 


 Neg (NEG)  


 





 


Urine Benzodiazepines Screen  Neg (NEG)   


 


Urine Cocaine Screen  Neg (NEG)   


 


Urine Cannabinoids Screen  Neg (NEG)   


 


Urine Ethyl Alcohol  Neg (NEG)   


 


Magnesium Level


 


 


 2.4 mg/dL


(1.8-2.4)


 


Iron Level


 


 


 50 ug/dL


()  L


 


Total Iron Binding Capacity


 


 


 393 ug/dL


(250-450)


 


Iron Saturation   13 % (15-34)  L





                                Laboratory Tests


5/26/21 18:57








5/27/21 08:20








                                Laboratory Tests


5/26/21 18:57








5/27/21 08:20











ECHOCARDIOGRAM


ECHOCARDIOGRAM


<Conclusion>


Left ventricle systolic function is moderately impaired. EF 30-35%.


There is severe global hypokinesis of the left ventricle.


Tissue Doppler imaging reveals moderate left ventricular diastolic dysfunction.


There is a pacemaker lead in the right ventricle.


Doppler and Color-flow revealed moderate mitral regurgitation.


Doppler and Color Flow revealed mild tricuspid regurgitation. There is moderate 

pulmonary hypertension. The PA pressure was estimated at 44 mmHg.





DATE:     07/10/20 0907





HEART CATH


HEART CATH


FINDINGS


1.  Hemodynamics: Left ventricular end-diastolic pressure 18 mmHg.  No pullback 

gradient across aortic valve.


2.  Left ventriculography: Severe left ventricular systolic dysfunction with 

ejection fraction estimated at 20 to 25%.  2+ mitral regurgitation seen.


3.  Coronary angiography:


a.  The left main coronary artery arose from the left sinus of Valsalva, gave 

rise to the left anterior descending and left circumflex arteries and did not 

show any significant stenosis.


b.  The left anterior descending artery showed patent long stented mid and 

distal segments with a very distal segment showing 40% in-stent restenosis.  

Stent in the diagonal branch was patent.


c.  The left circumflex artery was a large and dominant vessel.  The first 

obtuse marginal branch showed 100% chronic total occlusion within the stent in 

the proximal segment, described in prior cardiac catheterization.  The stent in 

the left posterior descending artery was patent.


d.  The right coronary artery was a small and nondominant vessel arising from 

the right sinus of Valsalva that showed 100% chronic total occlusion in the 

proximal segment, described in prior cardiac catheterization.





Conclusion


1.  Patent previously placed stents in the left anterior descending artery, 

diagonal branch and left posterior descending artery.  The obtuse marginal 

branch and nondominant right coronary artery showed 100% chronic total 

occlusions, described in prior cardiac catheterization.


2.  Severe left ventricular systolic dysfunction with ejection fraction estimat

ed at 20 to 25% with 2+ mitral regurgitation.





Recommendations


Optimization of medical therapy for coronary disease and ischemic 

cardiomyopathy.





DATE:     10/09/20 1353





ASSESSMENT/PLAN


ASSESSMENT/PLAN


1.  Chest pain, atypical; AMI ruled.  EKG paced without acute changes.


2.  Chronic systolic CHF; clinically compensated 


3.  ICM; s/p AICD ([employer])


4.  CAD; recent [facility] 10/2020, patent stents no intervenable lesions


5.  Hx of VT: not on antiarrhythmic therapy. tele noted with PVC's, but no 

significant arrhythmias. Mg WNL


6.  HTN: controlled


7.  HLP: not on goal


8.  H/o substance abuse: meth use. reports clean x2 months 


9.  Tobaccoism with likely COPD


10.  Poor compliance with appt. 








Recommendations





Secondary prevention measures


Device interrogation


Obtain recent cath report from [facility]


No plans for inpatient ischemic evaluation at this time


Needs to establish cardiology followup up discharge in [place]





YARED DOMINGUEZ MD 5/27/21 1804:


CARDIAC CONSULT


ASSESSMENT/PLAN


ASSESSMENT/PLAN


Patient seen and examine


I agree with our nurse practitioners assessment and plan.


Chest pain, atypical; AMI ruled.  EKG paced without acute changes.  Heart 

catheterization in 10/2020 with patent stents and no intervening bowel lesions. 

Reportedly repeat heart cath at [facility] within the last month with no intervention. 

Continue medical treatment.  Obtain old records.


Chronic systolic CHF; clinically compensated 


ICM; s/p AICD ([employer])


Hx of VT: not on antiarrhythmic therapy. tele noted with PVC's, but no 

significant arrhythmias. Mg WNL\HTN: controlled


HLP: not on goal


H/o substance abuse: meth use. reports clean x2 months 


Tobaccoism with  COPD


Poor compliance











BARBARA CHARLES           May 27, 2021 11:05


YARED DOMINGUEZ MD            May 27, 2021 18:04

## 2021-05-27 NOTE — NUR
Patient refuses all nebulizer breathing treatments despite educating about the replacement 
of home Spiriva.  Patient says Spiriva is the only medicine he will take.  VIRGEN mg.

## 2021-05-27 NOTE — EKG
[facility]

              [address] [place] [address]

Test Date:    2021               Test Time:    18:49:53

Pat Name:     JOB RIOS           Department:   

Patient ID:   [id number]           Room:         266 1

Gender:       M                        Technician:   

:          [id number]               Requested By: ELIE BUCKLEY

Order Number: [id number]           Reading MD:     

                                 Measurements

Intervals                              Axis          

Rate:         88                       P:            51

NM:           124                      QRS:          11

QRSD:         156                      T:            -142

QT:           418                                    

QTc:          510                                    

                           Interpretive Statements

SINUS RHYTHM

COMPLEX(ES) WITH ABERRANT INTRAVENTRICULAR CONDUCTION

VENTRICULAR PREMATURE COMPLEX(ES)

LEFT ATRIAL ABNORMALITY

NON SPECIFIC INTRAVENTRICULAR BLOCK

ABNORMAL ECG

RI6.01

No previous ECG available for comparison

## 2021-05-27 NOTE — NUR
SS following for discharge planning. SS reviewed pt chart and discussed with pt RN. Pt is 
from home in [place], [place] and is currently on room air. Cardiology following. Currently 
awaiting records from [facility]. SS will continue to follow for discharge 
planning.

## 2021-05-27 NOTE — PDOC1
History and Physical


Date of Admission


Date of Admission


DATE: 5/27/21 


TIME: 08:56





Identification/Chief Complaint


Chief Complaint


chest pain , hx stents





History of Present Illness


Patient is a 60yo M with past medical history CAD with stent, CHF, HTN, HLD, VT 

s/p AICD, COPD/asthma, GERD, OA, anxiety, IV methamphetamine abuse presented to 

the ED 5-26  with complaint of sharp chest pain  /well-known to our service with

multiple admissions with similar complaints.  pain is constant but fluctuating 

in severity, 79/10. Had a recent left heart catheterization on 10/9/2020 

showing patent previously placed stents in the left anterior descending artery, 

diagonal branch and left posterior descending artery, the obtuse marginal branch

and nondominant right coronary artery showed 100% chronic total occlusions, 

severe left ventricular systolic dysfunction with ejection fraction estimated at

20 to 25% with 2+ mitral regurgitation.  He was recommended optimization of 

medical therapy for coronary disease and ischemic cardiomyopathy.  He denies any

associated leg swelling.  known history of methamphetamine abuse as well as 

drug-seeking behavior in the emergency room.   he developed this sharp chest 

pain on the left side of his chest that radiates into his shoulder and down his 

arm.  He states this comes in waves and will go up to a 10 for about 30 seconds 

and then ease back down to about a 6.  He states this is different than a 

encounter chest pain he is ever had before.   He states that he took 3 nitro and

aspirin prior to arrival and this did not help with his pain.


STATES he if off meth, I saw him a few months ago at [facility] and believe he was 

cathed there, will need records





Past Medical History


Past Medical History:  Asthma, CAD, CHF, CVA, High Cholesterol, Hypertension, 

MI, Other


Additional Past Medical Histor:  VTACH,IV Drug abuse-Methamphetamine


Past Surgical History:  Angioplasty, Pacemaker, Other


Additional Past Surgical Histo:  15 cardiac stents, multiple cardiac 

catheterizations,WITH DEFIB


Smoking Status:  Current Every Day Smoker


Alcohol Use:  Rarely


Drug Use:  Methamphetamine


Social History Narrative:  STOPPED IV METH 2 MONTHS AGO


Cardiovascular:  CAD, CHF, HTN, Hyperlipidemia, Other


Pulmonary:  Asthma


CENTRAL NERVOUS SYSTEM:  CVA


GI:  GERD


Heme/Onc:  No pertinent hx


Hepatobiliary:  No pertinent hx


Psych:  Anxiety


Musculoskeletal:  Osteoarthritis


Rheumatologic:  No pertinent hx


Infectious disease:  No pertinent hx


Renal/:  No pertinent hx


Endocrine:  No pertinent hx





Past Surgical History


Past Surgical History:  Pacemaker, Other





Family History


Family History:  Hypertension





Social History


Smoke:  <1 pack per day


ALCOHOL:  none


Drugs:  Crystal meth, Other





Current Problem List


Problem List


Problems


Medical Problems:


(1) Chest pain


Status: Acute  











Current Medications


Current Medications





Current Medications


Aspirin (Aspirin Chewable) 324 mg 1X  ONCE PO ;  Start 5/26/21 at 19:15;  Stop 

5/26/21 at 19:16;  Status DC


Methocarbamol (Robaxin) 750 mg 1X  ONCE PO  Last administered on 5/26/21at 

19:25;  Start 5/26/21 at 19:45;  Stop 5/26/21 at 19:46;  Status DC


Nitroglycerin (Nitrostat) 0.4 mg PRN Q5MIN  PRN SL CHEST PAIN;  Start 5/26/21 at

19:15


Nitroglycerin (Nitrostat) 0.4 mg PRN Q5MIN  PRN SL CHEST PAIN;  Start 5/26/21 at

21:00;  Stop 5/27/21 at 20:59


Ketorolac Tromethamine (Toradol 30mg Vial) 30 mg 1X  ONCE IVP  Last administered

on 5/26/21at 21:41;  Start 5/26/21 at 21:45;  Stop 5/26/21 at 21:46;  Status DC


Morphine Sulfate (Morphine Sulfate) 2 mg PRN Q4HRS  PRN IV PAIN Last 

administered on 5/27/21at 08:42;  Start 5/26/21 at 23:30





Active Scripts


Active


Zyprexa (Olanzapine) 5 Mg Tablet 1 Tab PO BID 30 Days


Aspirin 81 Mg Tab.chew 1 Tab PO DAILY


Reported


Crestor (Rosuvastatin Calcium) 40 Mg Tablet 40 Mg PO HS


Metoprolol Succinate ( Xl ) (Metoprolol Succinate) 25 Mg Tab.er.24h 12.5 Mg PO 

DAILY


Spiriva (Tiotropium Shorter) 18 Mcg Cap.w.dev 1 Cap IH DAILY


NITROGLYCERIN SubLingual (Nitroglycerin) 0.4 Mg Tab.subl 0.4 Mg SL PRN Q5MIN PRN


Lyrica (Pregabalin) 300 Mg Capsule 1 Cap PO BID


Plavix (Clopidogrel Bisulfate) 75 Mg Tablet 75 Mg PO DAILY





Allergies


Allergies:  


Coded Allergies:  


     acetaminophen (Verified  Allergy, Intermediate, 10/8/20)


     albuterol (Verified  Adverse Reaction, Intermediate, 1/22/20)


   Patient states "it speeds my heart up too much"


     ibuprofen (Verified  Adverse Reaction, Intermediate, Nausea and Vomiting, 

1/22/20)





ROS


General:  YES: Fatigue; 


   No: Chills, Night Sweats, Malaise, Appetite, Other


PSYCHOLOGICAL ROS:  YES: Anxiety, Depression; 


   No: Behavioral Disorder, Concentration difficultie, Decreased libido, 

Disorientation, Hallucinations, Hostility, Irritablity, Memory difficulties, 

Mood Swings, Obsessive thoughts, Physical abuse, Sexual abuse, Sleep 

disturbances, Suicidal ideation, Other


Eyes:  No Blurry vision, No Decreased vision, No Double vision, No Dry eyes, No 

Excessive tearing, No Eye Pain, No Itchy Eyes, No Loss of vision, No 

Photophobia, No Scotomata, No Uses contacts, No Uses glasses, No Other


HEENT:  No: Heacaches, Visual Changes, Hearing change, Nasal congestion, Nasal 

discharge, Oral lesions, Sinus pain, Sore Throat, Epistaxis, Sneezing, Snoring, 

Tinnitus, Vertigo, Vocal changes, Other


ALLERGY AND IMMUNOLOGY:  YES: Hives; 


   No: Insect Bite Sensitivity, Itchy/Watery Eyes, Nasal Congestion, Post Nasal 

Drip, Seasonal Allergies, Other


Hematological and Lymphatic:  No: Bleeding Problems, Blood Clots, Blood 

Transfusions, Brusing, Night Sweats, Pallor, Swollen Lymph Nodes, Other


ENDOCRINE:  No: Breast Changes, Galactorrhea, Hair Pattern Changes, Hot Flashes,

Malaise/lethargy, Mood Swings, Palpitations, Polydipsia/polyuria, Skin Changes, 

Temperature Intolerance, Unexpected Weight Changes, Other


Breast:  No New/Changing Breast Lumps, No Nipple changes, No Nipple discharge, 

No Other


Respiratory:  YES: SOB with excertion; 


   No: Cough, Hemoptysis, Orthopnea, Pleuritic Pain, Shortness of breath, Sputum

Changes, Stridor, Tachypnea, Wheezing, Other


Cardiovascular:  yes Chest Pain; 


   No Palpitations, No Orthopnea, No Paroxysmal Noc. Dyspnea, No Edema, No Lt 

Headedness, No Other


Gastrointestinal:  No Nausea, No Vomiting, No Abdominal Pain, No Diarrhea, No 

Constipation, No Melena, No Hematochezia, No Other


Genitourinary:  No Dysuria, No Frequency, No Incontinence, No Hematuria, No 

Retention, No Discharge, No Urgency, No Pain, No Flank Pain, No Other, No , No ,

No , No , No , No , No 


Musculoskeletal:  Yes Joint Stiffness; 


   No Gait Disturbance, No Joint Pain, No Joint Swelling, No Muscle Pain, No 

Muscular Weakness, No Pain In:, No Swelling In:, No Other


Neurological:  No Behavorial Changes, No Bowel/Bladder ControlChng, No 

Confusion, No Dizziness, No Gait Disturbance, No Headaches, No Impaired 

Coord/balance, No Memory Loss, No Numbness/Tingling, No Seizures, No Speech 

Problems, No Tremors, No Visual Changes, No Weakness, No Other


Skin:  No Dry Skin, No Eczema, No Hair Changes, No Lumps, No Mole Changes, No 

Mottling, No Nail Changes, No Pruritus, No Rash, No Skin Lesion Changes, No 

Other, No Acne





Physical Exam


General:  Alert, Oriented X3, Cooperative, No acute distress, mild distress


HEENT:  PERRLA, EOMI, Mucous membr. moist/pink


Lungs:  Clear to auscultation, Normal air movement


Heart:  S1S2, RRR, no thrills, no gallops, no jug vein distention


Breasts:  Not examined


Abdomen:  Normal bowel sounds, Soft


Rectal Exam:  not examined


PELVIC:  Examination not indicated


Extremities:  No cyanosis


Neuro:  Normal speech, Sensation intact, Cranial nerves 3-12 NL, Other (anxious)


Psych/Mental Status:  Mental status NL, Mood NL





Vitals


Vitals





Vital Signs








  Date Time  Temp Pulse Resp B/P (MAP) Pulse Ox O2 Delivery O2 Flow Rate FiO2


 


5/27/21 07:04 97.9 69 18 110/57 (74) 99 Room Air  





 97.9       











Labs


Labs





Laboratory Tests








Test


 5/26/21


18:57 5/26/21


21:23 5/27/21


08:20


 


White Blood Count


 7.2 x10^3/uL


(4.0-11.0) 


 4.7 x10^3/uL


(4.0-11.0)


 


Red Blood Count


 3.98 x10^6/uL


(4.30-5.70) 


 4.16 x10^6/uL


(4.30-5.70)


 


Hemoglobin


 11.7 g/dL


(13.0-17.5) 


 12.1 g/dL


(13.0-17.5)


 


Hematocrit


 34.2 %


(39.0-53.0) 


 36.4 %


(39.0-53.0)


 


Mean Corpuscular Volume 86 fL ()   87 fL () 


 


Mean Corpuscular Hemoglobin 29 pg (25-35)   29 pg (25-35) 


 


Mean Corpuscular Hemoglobin


Concent 34 g/dL


(31-37) 


 33 g/dL


(31-37)


 


Red Cell Distribution Width


 19.4 %


(11.5-14.5) 


 19.6 %


(11.5-14.5)


 


Platelet Count


 203 x10^3/uL


(140-400) 


 190 x10^3/uL


(140-400)


 


Neutrophils (%) (Auto) 69 % (31-73)   52 % (31-73) 


 


Lymphocytes (%) (Auto) 16 % (24-48)   24 % (24-48) 


 


Monocytes (%) (Auto) 11 % (0-9)   16 % (0-9) 


 


Eosinophils (%) (Auto) 3 % (0-3)   6 % (0-3) 


 


Basophils (%) (Auto) 1 % (0-3)   2 % (0-3) 


 


Neutrophils # (Auto)


 4.9 x10^3/uL


(1.8-7.7) 


 2.4 x10^3/uL


(1.8-7.7)


 


Lymphocytes # (Auto)


 1.1 x10^3/uL


(1.0-4.8) 


 1.1 x10^3/uL


(1.0-4.8)


 


Monocytes # (Auto)


 0.8 x10^3/uL


(0.0-1.1) 


 0.7 x10^3/uL


(0.0-1.1)


 


Eosinophils # (Auto)


 0.2 x10^3/uL


(0.0-0.7) 


 0.3 x10^3/uL


(0.0-0.7)


 


Basophils # (Auto)


 0.1 x10^3/uL


(0.0-0.2) 


 0.1 x10^3/uL


(0.0-0.2)


 


Sodium Level


 140 mmol/L


(136-145) 


 





 


Potassium Level


 4.3 mmol/L


(3.5-5.1) 


 





 


Chloride Level


 105 mmol/L


() 


 





 


Carbon Dioxide Level


 24 mmol/L


(21-32) 


 





 


Anion Gap 11 (6-14)   


 


Blood Urea Nitrogen


 13 mg/dL


(8-26) 


 





 


Creatinine


 1.1 mg/dL


(0.7-1.3) 


 





 


Estimated GFR


(Cockcroft-Gault) 68.1 


 


 





 


Glucose Level


 86 mg/dL


(70-99) 


 





 


Calcium Level


 8.6 mg/dL


(8.5-10.1) 


 





 


Troponin I Quantitative


 < 0.017 ng/mL


(0.000-0.055) 


 





 


Urine Opiates Screen  Pos (NEG)  


 


Urine Methadone Screen  Neg (NEG)  


 


Urine Barbiturates  Neg (NEG)  


 


Urine Phencyclidine Screen  Neg (NEG)  


 


Urine


Amphetamine/Methamphetamine 


 Neg (NEG) 


 





 


Urine Benzodiazepines Screen  Neg (NEG)  


 


Urine Cocaine Screen  Neg (NEG)  


 


Urine Cannabinoids Screen  Neg (NEG)  


 


Urine Ethyl Alcohol  Neg (NEG)  








Laboratory Tests








Test


 5/26/21


18:57 5/26/21


21:23 5/27/21


08:20


 


White Blood Count


 7.2 x10^3/uL


(4.0-11.0) 


 4.7 x10^3/uL


(4.0-11.0)


 


Red Blood Count


 3.98 x10^6/uL


(4.30-5.70) 


 4.16 x10^6/uL


(4.30-5.70)


 


Hemoglobin


 11.7 g/dL


(13.0-17.5) 


 12.1 g/dL


(13.0-17.5)


 


Hematocrit


 34.2 %


(39.0-53.0) 


 36.4 %


(39.0-53.0)


 


Mean Corpuscular Volume 86 fL ()   87 fL () 


 


Mean Corpuscular Hemoglobin 29 pg (25-35)   29 pg (25-35) 


 


Mean Corpuscular Hemoglobin


Concent 34 g/dL


(31-37) 


 33 g/dL


(31-37)


 


Red Cell Distribution Width


 19.4 %


(11.5-14.5) 


 19.6 %


(11.5-14.5)


 


Platelet Count


 203 x10^3/uL


(140-400) 


 190 x10^3/uL


(140-400)


 


Neutrophils (%) (Auto) 69 % (31-73)   52 % (31-73) 


 


Lymphocytes (%) (Auto) 16 % (24-48)   24 % (24-48) 


 


Monocytes (%) (Auto) 11 % (0-9)   16 % (0-9) 


 


Eosinophils (%) (Auto) 3 % (0-3)   6 % (0-3) 


 


Basophils (%) (Auto) 1 % (0-3)   2 % (0-3) 


 


Neutrophils # (Auto)


 4.9 x10^3/uL


(1.8-7.7) 


 2.4 x10^3/uL


(1.8-7.7)


 


Lymphocytes # (Auto)


 1.1 x10^3/uL


(1.0-4.8) 


 1.1 x10^3/uL


(1.0-4.8)


 


Monocytes # (Auto)


 0.8 x10^3/uL


(0.0-1.1) 


 0.7 x10^3/uL


(0.0-1.1)


 


Eosinophils # (Auto)


 0.2 x10^3/uL


(0.0-0.7) 


 0.3 x10^3/uL


(0.0-0.7)


 


Basophils # (Auto)


 0.1 x10^3/uL


(0.0-0.2) 


 0.1 x10^3/uL


(0.0-0.2)


 


Sodium Level


 140 mmol/L


(136-145) 


 





 


Potassium Level


 4.3 mmol/L


(3.5-5.1) 


 





 


Chloride Level


 105 mmol/L


() 


 





 


Carbon Dioxide Level


 24 mmol/L


(21-32) 


 





 


Anion Gap 11 (6-14)   


 


Blood Urea Nitrogen


 13 mg/dL


(8-26) 


 





 


Creatinine


 1.1 mg/dL


(0.7-1.3) 


 





 


Estimated GFR


(Cockcroft-Gault) 68.1 


 


 





 


Glucose Level


 86 mg/dL


(70-99) 


 





 


Calcium Level


 8.6 mg/dL


(8.5-10.1) 


 





 


Troponin I Quantitative


 < 0.017 ng/mL


(0.000-0.055) 


 





 


Urine Opiates Screen  Pos (NEG)  


 


Urine Methadone Screen  Neg (NEG)  


 


Urine Barbiturates  Neg (NEG)  


 


Urine Phencyclidine Screen  Neg (NEG)  


 


Urine


Amphetamine/Methamphetamine 


 Neg (NEG) 


 





 


Urine Benzodiazepines Screen  Neg (NEG)  


 


Urine Cocaine Screen  Neg (NEG)  


 


Urine Cannabinoids Screen  Neg (NEG)  


 


Urine Ethyl Alcohol  Neg (NEG)  











Images


Images





Procedure(s) performed: Left heart catheterization, selective coronary 

angiography and left ventriculography





MODERATE SEDATION TIME: 30 MINUTES


FLUORO TIME: 3.3 MIN


DOSE: 38.9 GYCM2


CONTRAST: 112CC OMNI 300





INDICATION


The indication(s) include : unstable angina .





CSHA Clinical Frailty Scale


[facility] Clinical Frailty Scale: Mildly Frail





Heart Failure


Heart Failure: Yes


If Yes, Newly Diagnosed: No


If Yes, HF Type: Systolic     


If Yes, NYHA Class: Class II     





PROCEDURE NARRATIVE


After explaining the risks, benefits and alternative options, informed consent 

was obtained from patient.  Patient was brought to the cardiac Cath Lab and his 

left groin was prepped and draped in the usual fashion.  20 cc of 2% lidocaine w

as infiltrated into the skin and subcutaneous tissues for local anesthesia.  

Arterial access was obtained in the left common femoral artery and a 6 French 

sheath was inserted.  6 French JL4 6 French JR4 catheters were used to perform 

selective angiography of the left and right coronary arteries.  6 French pigtail

catheter was used to perform left ventriculography.  Patient tolerated the 

procedure well.  Hemostasis was achieved using Perclose suture closure device.  

There were no immediate complications.





FINDINGS


1.  Hemodynamics: Left ventricular end-diastolic pressure 18 mmHg.  No pullback 

gradient across aortic valve.


2.  Left ventriculography: Severe left ventricular systolic dysfunction with 

ejection fraction estimated at 20 to 25%.  2+ mitral regurgitation seen.


3.  Coronary angiography:


a.  The left main coronary artery arose from the left sinus of Valsalva, gave 

rise to the left anterior descending and left circumflex arteries and did not 

show any significant stenosis.


b.  The left anterior descending artery showed patent long stented mid and 

distal segments with a very distal segment showing 40% in-stent restenosis.  

Stent in the diagonal branch was patent.


c.  The left circumflex artery was a large and dominant vessel.  The first 

obtuse marginal branch showed 100% chronic total occlusion within the stent in 

the proximal segment, described in prior cardiac catheterization.  The stent in 

the left posterior descending artery was patent.


d.  The right coronary artery was a small and nondominant vessel arising from 

the right sinus of Valsalva that showed 100% chronic total occlusion in the 

proximal segment, described in prior cardiac catheterization.





Conclusion


1.  Patent previously placed stents in the left anterior descending artery, 

diagonal branch and left posterior descending artery.  The obtuse marginal 

branch and nondominant right coronary artery showed 100% chronic total oc

clusions, described in prior cardiac catheterization.


2.  Severe left ventricular systolic dysfunction with ejection fraction es

timated at 20 to 25% with 2+ mitral regurgitation.





Recommendations


Optimization of medical therapy for coronary disease and ischemic 

cardiomyopathy.





Signed by : Nova Vance, 


Electronically Approved : 10/09/2020 14:27:37














DICTATED and SIGNED BY:     NOVA VANCE MD


DATE:     10/09/20 1353





VTE Prophylaxis Ordered


VTE Prophylaxis Devices:  No


VTE Pharmacological Prophylaxi:  Yes





Assessment/Plan


Assessment/Plan





Assessment/Plan


Assessment/Plan





Unstable angina


Patent previously placed stents in the left anterior descending artery, diagonal

branch and left posterior descending artery.  The obtuse marginal branch and 

nondominant right coronary artery showed 100% chronic total occlusions, 

described in prior cardiac catheterization.CAD


Elevated BNP


severe left ventricular systolic dysfunction with ejection fraction estimated at

20 to 25% with 2+ mitral regurgitation.


HX METH ABUSE  REMOTE


Normocytic anemia

















Plan:








Troponins trend


Consulted cardiology


Morphine, nitroglycerin as needed


Resume home medications


FEN - Cardiac diet


PPX  - Heparin


FULL CODE


fe panel








33 MIN CC TIME





Justifications for Admission


Other Justification


ACS











TRU TREVINO MD          May 27, 2021 08:56

## 2021-05-28 VITALS — SYSTOLIC BLOOD PRESSURE: 113 MMHG | DIASTOLIC BLOOD PRESSURE: 52 MMHG

## 2021-05-28 VITALS — DIASTOLIC BLOOD PRESSURE: 65 MMHG | SYSTOLIC BLOOD PRESSURE: 102 MMHG

## 2021-05-28 VITALS — SYSTOLIC BLOOD PRESSURE: 105 MMHG | DIASTOLIC BLOOD PRESSURE: 72 MMHG

## 2021-05-28 RX ADMIN — MORPHINE SULFATE PRN MG: 2 INJECTION, SOLUTION INTRAMUSCULAR; INTRAVENOUS at 05:30

## 2021-05-28 RX ADMIN — METOPROLOL SUCCINATE SCH MG: 25 TABLET, EXTENDED RELEASE ORAL at 08:42

## 2021-05-28 RX ADMIN — ASPIRIN 81 MG SCH MG: 81 TABLET ORAL at 08:41

## 2021-05-28 RX ADMIN — CLOPIDOGREL BISULFATE SCH MG: 75 TABLET ORAL at 08:41

## 2021-05-28 RX ADMIN — MORPHINE SULFATE PRN MG: 2 INJECTION, SOLUTION INTRAMUSCULAR; INTRAVENOUS at 09:54

## 2021-05-28 RX ADMIN — MORPHINE SULFATE PRN MG: 2 INJECTION, SOLUTION INTRAMUSCULAR; INTRAVENOUS at 01:17

## 2021-05-28 RX ADMIN — LANSOPRAZOLE SCH MG: 30 TABLET, ORALLY DISINTEGRATING, DELAYED RELEASE ORAL at 11:17

## 2021-05-28 RX ADMIN — LANSOPRAZOLE SCH MG: 30 TABLET, ORALLY DISINTEGRATING, DELAYED RELEASE ORAL at 16:30

## 2021-05-28 RX ADMIN — PREGABALIN SCH MG: 75 CAPSULE ORAL at 08:41

## 2021-05-28 RX ADMIN — HEPARIN SODIUM SCH UNIT: 5000 INJECTION, SOLUTION INTRAVENOUS; SUBCUTANEOUS at 05:30

## 2021-05-28 RX ADMIN — HEPARIN SODIUM SCH UNIT: 5000 INJECTION, SOLUTION INTRAVENOUS; SUBCUTANEOUS at 14:00

## 2021-05-28 RX ADMIN — MORPHINE SULFATE PRN MG: 2 INJECTION, SOLUTION INTRAMUSCULAR; INTRAVENOUS at 14:26

## 2021-05-28 NOTE — NUR
Discharge Note:



LEFT[facility] [place]



Patient refused to go over paperwork with RN, signed copy and placed in the chart. 



The following instructions and handouts were given: Chest pain



[facility]. 



IV out, monitor off and placed at nurses station.

## 2021-05-28 NOTE — PDOC
[employer] PROGRESS NOTE


Date of Service


DOS:


DATE: 5/28/21 


TIME: 07:45





Chief Complaint


Chief Complaint


A/P:


Chest pain - high risk ACS given his history and lateral TWI, will trend 

troponins, telemetry. Will give IS, flutter valve. May have some achalasia


Acute on chronic CHF - left ventricular systolic dysfunction with ejection 

fraction estimated at 20 to 25%


Hypokalemia - will replace. Check mag level


Ischemic cardiomyopathy -also with nonischemic cardiomyopathy due to 

methamphetamine use actively.


CAD - s/p x4 cardiac catherizations. multiple stents in the past. At least 12 

stents reported per patient - previously placed stents in the left anterior 

descending artery, diagonal branch and left posterior descending artery.  The 

obtuse marginal branch and nondominant right coronary artery showed 100% chronic

total occlusions, described in prior cardiac catheterization. (Cath report from 

10/2020)


Mitral regurgitation


h/o VT s/p AICD - med[employer]. No events


HTN - cont meds


HLD - cont meds


IV meth use - currently not using sees a counselor through [facility]


Tobaccoism -counseled on cessation he is nondistended nicotine patch states he 

will need to start smoking as soon as he leaves


Labile mood with psychotic features and Anxiety/agitation - calmed with verbal 

counseling, haldol. Prn zyprexa ordered. Likely he is undiagnosed Bipolar or 

schizoaffective


History of Drug seeking behavior -advised with his substance abuse history is 

high risk and should not be given an opioid prescription on discharge and to 

minimize opioids


Anemia - likely from chronic substance abuse, will need monitoring outpatient.


Hyponatremia - likely nutritional and hypervolemic, will monitor





FEN - Cardiac


PPX - Lovenox


FULL CODE


Dispo - inpatient for high risk ACS





History of Present Illness


History of Present Illness


Mr Bishop is a 61yo M w/ PMHx CAD x multiple GENNY, CHF, HTN, HLD, VT s/p AICD, 

COPD/asthma, GERD, OA, anxiety, IV methamphetamine comes to ED c/o left sided 

chest pain.


 Lasts 20-30 seconds at a time and occurs a couple of times every house. No 

associated dizziness, diaphoresis, palpitations, SOA, or nausea/vomiting.  He 

describes it as coming in waves all of a sudden 6 out of 10 and comes down.  He 

frequently requests IV morphine I have counseled him on drug-seeking behavior on

numerous occasions.  He is amenable to trying incentive spirometer flutter valve

and PPI and potentially having gastroenterology assessment for possible 

achalasia.  Troponin EKG negative.  Cardiology has seen patient.  He tells me he

had a cardiac catheterization when he was in research after he had had multiple 

AICD discharges after using IV "dope".  He has not used since.  He says he has a

lso had a cardiac catheterization in [facility].





Vitals/I&O


Vitals/I&O:





                                   Vital Signs








  Date Time  Temp Pulse Resp B/P (MAP) Pulse Ox O2 Delivery O2 Flow Rate FiO2


 


5/28/21 07:40      Room Air  


 


5/27/21 22:46 98.5 83 20 113/49 (70) 97   





 98.5       














                                    I & O   


 


 5/27/21 5/27/21 5/28/21





 15:00 23:00 07:00


 


Intake Total 1400 ml 1360 ml 


 


Balance 1400 ml 1360 ml 











Physical Exam


General:  Alert, Oriented X3, Cooperative, No acute distress, mild distress


Lungs:  Wheezing, Crackles


Abdomen:  Normal bowel sounds, Soft


Extremities:  No cyanosis





Labs


Labs:





Laboratory Tests








Test


 5/27/21


08:20


 


White Blood Count


 4.7 x10^3/uL


(4.0-11.0)


 


Red Blood Count


 4.16 x10^6/uL


(4.30-5.70)


 


Hemoglobin


 12.1 g/dL


(13.0-17.5)


 


Hematocrit


 36.4 %


(39.0-53.0)


 


Mean Corpuscular Volume 87 fL () 


 


Mean Corpuscular Hemoglobin 29 pg (25-35) 


 


Mean Corpuscular Hemoglobin


Concent 33 g/dL


(31-37)


 


Red Cell Distribution Width


 19.6 %


(11.5-14.5)


 


Platelet Count


 190 x10^3/uL


(140-400)


 


Neutrophils (%) (Auto) 52 % (31-73) 


 


Lymphocytes (%) (Auto) 24 % (24-48) 


 


Monocytes (%) (Auto) 16 % (0-9) 


 


Eosinophils (%) (Auto) 6 % (0-3) 


 


Basophils (%) (Auto) 2 % (0-3) 


 


Neutrophils # (Auto)


 2.4 x10^3/uL


(1.8-7.7)


 


Lymphocytes # (Auto)


 1.1 x10^3/uL


(1.0-4.8)


 


Monocytes # (Auto)


 0.7 x10^3/uL


(0.0-1.1)


 


Eosinophils # (Auto)


 0.3 x10^3/uL


(0.0-0.7)


 


Basophils # (Auto)


 0.1 x10^3/uL


(0.0-0.2)


 


Sodium Level


 139 mmol/L


(136-145)


 


Potassium Level


 5.0 mmol/L


(3.5-5.1)


 


Chloride Level


 104 mmol/L


()


 


Carbon Dioxide Level


 24 mmol/L


(21-32)


 


Anion Gap 11 (6-14) 


 


Blood Urea Nitrogen


 17 mg/dL


(8-26)


 


Creatinine


 1.1 mg/dL


(0.7-1.3)


 


Estimated GFR


(Cockcroft-Gault) 68.1 





 


Glucose Level


 113 mg/dL


(70-99)


 


Calcium Level


 8.6 mg/dL


(8.5-10.1)


 


Magnesium Level


 2.4 mg/dL


(1.8-2.4)


 


Iron Level


 50 ug/dL


()


 


Total Iron Binding Capacity


 393 ug/dL


(250-450)


 


Iron Saturation 13 % (15-34) 


 


Troponin I Quantitative


 < 0.017 ng/mL


(0.000-0.055)











Assessment and Plan


Assessmemt and Plan


Problems


Medical Problems:


(1) Chest pain


Status: Acute  











Comment


Review of Relevant


I have reviewed the following items luke (where applicable) has been applied.


Medications:





Current Medications








 Medications


  (Trade)  Dose


 Ordered  Sig/Gwen


 Route


 PRN Reason  Start Time


 Stop Time Status Last Admin


Dose Admin


 


 Aspirin


  (Aspirin


 Chewable)  81 mg  DAILY


 PO


   5/27/21 11:00


    5/27/21 10:17





 


 Clopidogrel


 Bisulfate


  (Plavix)  75 mg  DAILY


 PO


   5/27/21 11:00


    5/27/21 10:18





 


 Metoprolol


 Succinate


  (Toprol Xl)  12.5 mg  DAILY


 PO


   5/27/21 11:00


    5/27/21 10:18





 


 Olanzapine


  (ZyPREXA)  5 mg  BID


 PO


   5/27/21 11:00


    5/27/21 21:31





 


 Pregabalin


  (Lyrica)  300 mg  BID


 PO


   5/27/21 11:00


    5/27/21 21:32





 


 Atorvastatin


 Calcium


  (Lipitor)  80 mg  HS


 PO


   5/27/21 21:00


    5/27/21 21:30














Justifications for Admission


Other Justification


ACS











FELICITY WHITLOCK MD        May 28, 2021 07:45

## 2021-05-28 NOTE — PDOC2
GI CONSULT


Date of Service:


DATE: 5/28/21 


TIME: 12:24





Reason For Consult:


concern for achalasia





HPI:


HPI:


62 y/o male admitted 5/26.


Recurrent issues w/ atypical chest pain.  Does have cardiac history - has been 

evaluated by cardiology team here (current and past admissions) and also [facility] 

recently (w/ cath).  No plans for ischemic workup as inpt at this time.





Pain is located in left central chest.  He says it feels the same as it always 

does but also "I can't say if it's different."  Occurs randomly for 15-45 

seconds at a time.  No clear relation to eating.  Sometimes felt across upper 

left shoulder.  Not associated w/ n/v.  I asked if he had any acid reflux issues

and he said "I might and not even realize it."  Might have some indigestion from

time to time but not really sure.  No dysphagia, though once a really small pill

got stuck in his throat.  Good appetite, no bloating or early satiety.  No 

weight loss.  No diarrhea, constipation, hematochezia, or melena.





Regarding GI history, he's a little fuzzy on details.  First says no EGD, then 

says he had one as an outpt at  (and [place] Medicaid didn't pay for it so that's 

why he needs it done as an inpt here, but he also says he lives at [facility] and is going back there when he's discharged from this facility).  

Apparently the EGD at  was normal but he does have a remote h/o "ulcer" 

diagnosed with an x-ray after he "threw up blood" 2-3 years ago - unfortunately 

can't recall where this workup was done but does remember he did not have to 

take any medications afterwards.  Reports memory issues after strokes and also 

"I don't care to remember a lot of what has happened."


No previous colonoscopy.


Denies GB, liver, and pancreas history.


Says Tylenol "tears up" his stomach - has listed allergy to this and ibuprofen. 

Takes Lyrica for pain at home and occasional Ally Extra Strength for headaches.


H/o CAD w/ stents on Plavix and ASA.


Chart mentions h/o drug-seeking behavior.  H/o meth use; he reports "I'm clean 

now" - tox screen was negative except opiates.


No swallowing issues per nurse.  Eats a lot.  Wants morphine.





Anemia noted on past admissions (Hgb this time 12.1) w/ microcytosis.  Iron 50, 

TIBC 393, sat 13.  BUN and Cr normal.  TSH normal 1/2020.  


Lots of chest imaging in chart, no mention of upper abdominal abnormalities.





PMH:


PMH:


CAD w/ stents, CHF, ICM, HTN, HLD, VT, COPD, CVAs, anxiety, OA


AICD, PCIs, cardiac stents





FH:


Family History:  No pertinent hx





Social History:


Smoke:  <1 pack per day


ALCOHOL:  occassional ("I've been drunk before, sure")


Drugs:  Crystal meth (tox screen negative this admission)





ROS:





GEN: Denies fevers, chills, sweats


HEENT: Denies blurred vision, sore throat


CV: +chest pain


RESP: Denies shortness of air, cough


GI: Per HPI


: Denies hematuria, dysuria


ENDO: Denies weight changes


NEURO: Denies confusion, dizziness


MSK: Denies weakness, joint pain/swelling


SKIN: Denies jaundice, pruritus





Vitals:


Vitals:





                                   Vital Signs








  Date Time  Temp Pulse Resp B/P (MAP) Pulse Ox O2 Delivery O2 Flow Rate FiO2


 


5/28/21 11:17     97 Room Air  


 


5/28/21 10:53 97.9 80 18 113/52 (72)    





 97.9       











Labs:


Labs:


see EMR





Allergies:


Coded Allergies:  


     acetaminophen (Verified  Allergy, Intermediate, 10/8/20)


     albuterol (Verified  Adverse Reaction, Intermediate, 1/22/20)


   Patient states "it speeds my heart up too much"


     ibuprofen (Verified  Adverse Reaction, Intermediate, Nausea and Vomiting, 

1/22/20)





Medications:





Current Medications








 Medications


  (Trade)  Dose


 Ordered  Sig/Gwen


 Route


 PRN Reason  Start Time


 Stop Time Status Last Admin


Dose Admin


 


 Atorvastatin


 Calcium


  (Lipitor)  80 mg  HS


 PO


   5/27/21 21:00


    5/27/21 21:30





 


 Lansoprazole


  (Prevacid)  30 mg  BIDBFRMEAL


 FT


   5/28/21 11:00


    5/28/21 11:17














Imaging:


Imaging:


CXR


IMPRESSION:  Unchanged cardiomegaly. No acute abnormality.





PE:





GEN: NAD


HEENT: Atraumatic, PERRL


LUNGS: CTAB


HEART: RRR


ABD: NABS, S/ND/NT


EXTREMITY: No edema


SKIN: No rashes, no jaundice


NEURO/PSYCH: A & O 3, anxious, bit twitchy





A/P:


A/P:


Atypical chest pain


Mild anemia, probably some iron deficiency


?GERD, ?h/o PUD - pretty vague history


CRC screen - none


H/o CAD w/ stents on Plavix and ASA


H/o meth use - tox screen negative





--


Agree w/ PPI - on BID dosing here, would continue when discharged.


Try GI cocktail, consider addition of iron.


Reports previous outpt EGD at  - try to get those records.


Probably not indicated to pursue emergent inpt EGD though should be done as 

outpt w/ tandem screening colonoscopy at home at the [facility].  Will 

review this w/ Dr. Marshall along w/ any other inpt GI recommendations 

(?esophagram).


Encouraged meth avoidance.











ALECIA ROSADO         May 28, 2021 12:48

## 2021-05-28 NOTE — PDOC3
Discharge Summary


Visit Information


Date of Admission:  May 26, 2021


Date of Discharge:  May 28, 2021


Admitting Diagnosis:  Chest pain


Final Diagnosis


Problems


Medical Problems:


(1) Chest pain


Status: Acute  











Brief Hospital Course


Allergies





                                    Allergies








Coded Allergies Type Severity Reaction Last Updated Verified


 


  acetaminophen Allergy Intermediate  10/8/20 Yes


 


  albuterol Adverse Reaction Intermediate  1/22/20 Yes


 


  ibuprofen Adverse Reaction Intermediate Nausea and Vomiting 1/22/20 Yes








Vital Signs





Vital Signs








  Date Time  Temp Pulse Resp B/P (MAP) Pulse Ox O2 Delivery O2 Flow Rate FiO2


 


5/28/21 15:29 97.7 74 20 102/65 (77) 96 Room Air  





 97.7       








Lab Results





Laboratory Tests








Test


 5/26/21


18:57 5/26/21


21:23 5/27/21


08:20


 


White Blood Count


 7.2 x10^3/uL


(4.0-11.0) 


 4.7 x10^3/uL


(4.0-11.0)


 


Red Blood Count


 3.98 x10^6/uL


(4.30-5.70) 


 4.16 x10^6/uL


(4.30-5.70)


 


Hemoglobin


 11.7 g/dL


(13.0-17.5) 


 12.1 g/dL


(13.0-17.5)


 


Hematocrit


 34.2 %


(39.0-53.0) 


 36.4 %


(39.0-53.0)


 


Mean Corpuscular Volume 86 fL ()   87 fL () 


 


Mean Corpuscular Hemoglobin 29 pg (25-35)   29 pg (25-35) 


 


Mean Corpuscular Hemoglobin


Concent 34 g/dL


(31-37) 


 33 g/dL


(31-37)


 


Red Cell Distribution Width


 19.4 %


(11.5-14.5) 


 19.6 %


(11.5-14.5)


 


Platelet Count


 203 x10^3/uL


(140-400) 


 190 x10^3/uL


(140-400)


 


Neutrophils (%) (Auto) 69 % (31-73)   52 % (31-73) 


 


Lymphocytes (%) (Auto) 16 % (24-48)   24 % (24-48) 


 


Monocytes (%) (Auto) 11 % (0-9)   16 % (0-9) 


 


Eosinophils (%) (Auto) 3 % (0-3)   6 % (0-3) 


 


Basophils (%) (Auto) 1 % (0-3)   2 % (0-3) 


 


Neutrophils # (Auto)


 4.9 x10^3/uL


(1.8-7.7) 


 2.4 x10^3/uL


(1.8-7.7)


 


Lymphocytes # (Auto)


 1.1 x10^3/uL


(1.0-4.8) 


 1.1 x10^3/uL


(1.0-4.8)


 


Monocytes # (Auto)


 0.8 x10^3/uL


(0.0-1.1) 


 0.7 x10^3/uL


(0.0-1.1)


 


Eosinophils # (Auto)


 0.2 x10^3/uL


(0.0-0.7) 


 0.3 x10^3/uL


(0.0-0.7)


 


Basophils # (Auto)


 0.1 x10^3/uL


(0.0-0.2) 


 0.1 x10^3/uL


(0.0-0.2)


 


Sodium Level


 140 mmol/L


(136-145) 


 139 mmol/L


(136-145)


 


Potassium Level


 4.3 mmol/L


(3.5-5.1) 


 5.0 mmol/L


(3.5-5.1)


 


Chloride Level


 105 mmol/L


() 


 104 mmol/L


()


 


Carbon Dioxide Level


 24 mmol/L


(21-32) 


 24 mmol/L


(21-32)


 


Anion Gap 11 (6-14)   11 (6-14) 


 


Blood Urea Nitrogen


 13 mg/dL


(8-26) 


 17 mg/dL


(8-26)


 


Creatinine


 1.1 mg/dL


(0.7-1.3) 


 1.1 mg/dL


(0.7-1.3)


 


Estimated GFR


(Cockcroft-Gault) 68.1 


 


 68.1 





 


Glucose Level


 86 mg/dL


(70-99) 


 113 mg/dL


(70-99)


 


Calcium Level


 8.6 mg/dL


(8.5-10.1) 


 8.6 mg/dL


(8.5-10.1)


 


Troponin I Quantitative


 < 0.017 ng/mL


(0.000-0.055) 


 < 0.017 ng/mL


(0.000-0.055)


 


Urine Opiates Screen  Pos (NEG)  


 


Urine Methadone Screen  Neg (NEG)  


 


Urine Barbiturates  Neg (NEG)  


 


Urine Phencyclidine Screen  Neg (NEG)  


 


Urine


Amphetamine/Methamphetamine 


 Neg (NEG) 


 





 


Urine Benzodiazepines Screen  Neg (NEG)  


 


Urine Cocaine Screen  Neg (NEG)  


 


Urine Cannabinoids Screen  Neg (NEG)  


 


Urine Ethyl Alcohol  Neg (NEG)  


 


Magnesium Level


 


 


 2.4 mg/dL


(1.8-2.4)


 


Iron Level


 


 


 50 ug/dL


()


 


Total Iron Binding Capacity


 


 


 393 ug/dL


(250-450)


 


Iron Saturation   13 % (15-34) 








Brief Hospital Course


Mr Bishop is a 59yo M w/ PMHx CAD x multiple GENNY, CHF, HTN, HLD, VT s/p AICD, 

COPD/asthma, GERD, OA, anxiety, IV methamphetamine comes to ED c/o left sided 

chest pain.


 Lasts 20-30 seconds at a time and occurs a couple of times every house. No 

associated dizziness, diaphoresis, palpitations, SOA, or nausea/vomiting.  He 

describes it as coming in waves all of a sudden 6 out of 10 and comes down.  He 

frequently requests IV morphine I have counseled him on drug-seeking behavior on

numerous occasions.  He is amenable to trying incentive spirometer flutter valve

and PPI and potentially having gastroenterology assessment for possible ach

alasia.  Troponin x3 and EKG negative.  Cardiology has seen patient.  He tells 

me he had a cardiac catheterization when he was in research after he had had 

multiple AICD discharges after using IV "dope".  He has not used since.  He says

he has also had a cardiac catheterization in [facility].





Medical records reviewed and AICD interrogated he had no shocks delivered 

recently.  He did continue to request IV morphine and says that something be 

done for his chest pain.  Given recent negative cardiac catheterization October 2020 and his stability on cardiac medications no further intervention was 

recommended.  GI was consulted for concern for achalasia and will have a planned

outpatient EGD as dissolvable PPI lansoprazole did help with his pain his last 

EGD was a [facility] on 4/13/2016 with evidence of reflux 

esophagitis at that time.





On discharge reviewed with patient his recent negative testing at [facility] on discharge on 5/24/2021 where he had a negative CT PE he was 

admitted on 522 for that admission and had been at [facility] and 

discharged on 5/21/2021.  Counseled him on his drug-seeking behavior continue to

encourage him on his cessation of IV amphetamines as his urine drug screen was 

negative for those but was positive for opioids I offered him Suboxone therapy 

he refused.





Consults: Cardiology and Gastroenterology





Problem list:


Chest pain - high risk ACS given his history and lateral TWI, negative trended 

troponins, telemetry with no activity. Will give IS, flutter valve. May have 

some achalasia and needs outpatient repeat EGD


Acute on chronic CHF - left ventricular systolic dysfunction with ejection 

fraction estimated at 20 to 25%


Hypokalemia - replaced.


Ischemic cardiomyopathy - also with nonischemic cardiomyopathy due to 

methamphetamine use actively.


CAD - s/p x4 cardiac catherizations. multiple stents in the past. At least 12 

stents reported per patient - previously placed stents in the left anterior 

descending artery, diagonal branch and left posterior descending artery.  The 

obtuse marginal branch and nondominant right coronary artery showed 100% chronic

total occlusions, described in prior cardiac catheterization. (Cath report from 

10/2020)


Mitral regurgitation


h/o VT s/p AICD - [employer]. No events


HTN - cont meds


HLD - cont meds


IV meth use - currently not using sees a counselor through [facility]


Tobaccoism -counseled on cessation he is nondistended nicotine patch states he 

will need to start smoking as soon as he leaves


Labile mood with psychotic features and Anxiety/agitation - calmed with verbal 

counseling, haldol. Prn zyprexa ordered. Likely he is undiagnosed Bipolar or 

schizoaffective


History of Drug seeking behavior -advised with his substance abuse history is 

high risk and should not be given an opioid prescription on discharge and to 

minimize opioids


Anemia - likely from chronic substance abuse, will need monitoring outpatient.





Greater than 30 minutes spent on d/c





Discharge Information


Condition at Discharge:  Improved


Follow Up:  Weeks (1)


Disposition/Orders:  D/C to Home


Scheduled


Aspirin (Aspirin) 81 Mg Tab.chew, 1 TAB PO DAILY, #10


   Prescribed by: ELROY NORIEGA D.O. on 9/2/20 0607


   Last Action: Continued on 5/27/21 0959 by RA HOANG RN


Clopidogrel Bisulfate (Plavix) 75 Mg Tablet, 75 MG PO DAILY for TO PREVENT BLOOD

CLOTS, #30 Ref 0 (Reported)


   Entered as Reported by: Madelyn Sy on 4/17/15 2308


   Last Action: Continued on 5/27/21 0959 by RA HOANG RN


Lansoprazole (Prevacid) 30 Mg Tab.rap.dr, 30 MG FT BIDBFRMEAL for GERD for 30 

Days, #60 Ref 2


   Prescribed by: FELICITY WHITLOCK MD on 5/28/21 1444


Metoprolol Succinate (Metoprolol Succinate ( Xl )) 25 Mg Tab.er.24h, 12.5 MG PO 

DAILY for FOR HYPERTENSION, #30 Ref 0 (Reported)


   Entered as Reported by: DEBBIE SLOAN [facility] on 1/22/20 0814


   Last Action: Continued on 5/27/21 0959 by RA HOANG RN


Olanzapine (Zyprexa) 5 Mg Tablet, 1 TAB PO BID for Agitation for 30 Days, #60 

Ref 5


   Prescribed by: FELICITY WHITLOCK MD on 2/7/21 1134


   Last Action: Continued on 5/27/21 0959 by RA HOANG RN


Pregabalin (Lyrica) 300 Mg Capsule, 1 CAP PO BID, #60 Ref 2 (Reported)


   Entered as Reported by: TOM MONTE on 6/14/16 0412


   Last Action: Converted on 5/27/21 0959 by RA HOANG RN


Rosuvastatin Calcium (Crestor) 40 Mg Tablet, 40 MG PO HS for FOR CHOLESTEROL, 

#30 Ref 0 (Reported)


   Entered as Reported by: BARBI MANE RN on 8/7/20 1803


   Last Action: Converted on 5/27/21 0959 by RA HOANG RN


Tiotropium Bromide (Spiriva) 18 Mcg Cap.w.dev, 1 CAP IH DAILY, #30 Ref 3 

(Reported)


   Entered as Reported by: TOM MONTE on 6/14/16 0412


   Last Action: Converted on 5/27/21 0959 by RA HOANG RN





Scheduled PRN


Nitroglycerin (NITROGLYCERIN SubLingual) 0.4 Mg Tab.subl, 0.4 MG SL PRN Q5MIN 

PRN for CHEST PAIN, (Reported)


   Entered as Reported by: TOM MONTE on 6/14/16 0412


   Last Action: Continued on 5/27/21 0959 by RA HOANG RN





Justicifation of Admission Dx:


Justifications for Admission:


Justification of Admission Dx:  N/A


Angina:  Symp at Rest











FELICITY WHITLOCK MD        May 28, 2021 16:16

## 2021-06-15 ENCOUNTER — HOSPITAL ENCOUNTER (OUTPATIENT)
Dept: HOSPITAL 61 - ER | Age: 61
Setting detail: OBSERVATION
LOS: 1 days | Discharge: HOME | End: 2021-06-16
Attending: FAMILY MEDICINE | Admitting: FAMILY MEDICINE
Payer: MEDICAID

## 2021-06-15 VITALS — DIASTOLIC BLOOD PRESSURE: 46 MMHG | SYSTOLIC BLOOD PRESSURE: 99 MMHG

## 2021-06-15 VITALS — BODY MASS INDEX: 23.88 KG/M2 | HEIGHT: 67 IN | WEIGHT: 152.12 LBS

## 2021-06-15 DIAGNOSIS — I50.22: ICD-10-CM

## 2021-06-15 DIAGNOSIS — F15.90: ICD-10-CM

## 2021-06-15 DIAGNOSIS — I49.3: ICD-10-CM

## 2021-06-15 DIAGNOSIS — Z95.5: ICD-10-CM

## 2021-06-15 DIAGNOSIS — R07.89: Primary | ICD-10-CM

## 2021-06-15 DIAGNOSIS — Z86.73: ICD-10-CM

## 2021-06-15 DIAGNOSIS — J44.9: ICD-10-CM

## 2021-06-15 DIAGNOSIS — Z79.02: ICD-10-CM

## 2021-06-15 DIAGNOSIS — F17.210: ICD-10-CM

## 2021-06-15 DIAGNOSIS — Z79.82: ICD-10-CM

## 2021-06-15 DIAGNOSIS — I25.2: ICD-10-CM

## 2021-06-15 DIAGNOSIS — Z95.810: ICD-10-CM

## 2021-06-15 DIAGNOSIS — E78.00: ICD-10-CM

## 2021-06-15 DIAGNOSIS — I11.0: ICD-10-CM

## 2021-06-15 DIAGNOSIS — I25.10: ICD-10-CM

## 2021-06-15 DIAGNOSIS — E78.5: ICD-10-CM

## 2021-06-15 LAB
ALBUMIN SERPL-MCNC: 3.5 G/DL (ref 3.4–5)
ALBUMIN/GLOB SERPL: 1 {RATIO} (ref 1–1.7)
ALP SERPL-CCNC: 95 U/L (ref 46–116)
ALT SERPL-CCNC: 18 U/L (ref 16–63)
ANION GAP SERPL CALC-SCNC: 14 MMOL/L (ref 6–14)
APTT BLD: 40 SEC (ref 24–38)
AST SERPL-CCNC: 12 U/L (ref 15–37)
BASOPHILS # BLD AUTO: 0.1 X10^3/UL (ref 0–0.2)
BASOPHILS NFR BLD: 1 % (ref 0–3)
BILIRUB SERPL-MCNC: 0.2 MG/DL (ref 0.2–1)
BUN SERPL-MCNC: 10 MG/DL (ref 8–26)
BUN/CREAT SERPL: 11 (ref 6–20)
CALCIUM SERPL-MCNC: 8.6 MG/DL (ref 8.5–10.1)
CHLORIDE SERPL-SCNC: 105 MMOL/L (ref 98–107)
CO2 SERPL-SCNC: 23 MMOL/L (ref 21–32)
CREAT SERPL-MCNC: 0.9 MG/DL (ref 0.7–1.3)
EOSINOPHIL NFR BLD: 0.2 X10^3/UL (ref 0–0.7)
EOSINOPHIL NFR BLD: 3 % (ref 0–3)
ERYTHROCYTE [DISTWIDTH] IN BLOOD BY AUTOMATED COUNT: 16.5 % (ref 11.5–14.5)
GFR SERPLBLD BASED ON 1.73 SQ M-ARVRAT: 85.8 ML/MIN
GLUCOSE SERPL-MCNC: 98 MG/DL (ref 70–99)
HCT VFR BLD CALC: 32.5 % (ref 39–53)
HGB BLD-MCNC: 11 G/DL (ref 13–17.5)
LYMPHOCYTES # BLD: 1.3 X10^3/UL (ref 1–4.8)
LYMPHOCYTES NFR BLD AUTO: 22 % (ref 24–48)
MAGNESIUM SERPL-MCNC: 1.9 MG/DL (ref 1.8–2.4)
MCH RBC QN AUTO: 30 PG (ref 25–35)
MCHC RBC AUTO-ENTMCNC: 34 G/DL (ref 31–37)
MCV RBC AUTO: 88 FL (ref 79–100)
MONO #: 0.7 X10^3/UL (ref 0–1.1)
MONOCYTES NFR BLD: 11 % (ref 0–9)
NEUT #: 3.7 X10^3/UL (ref 1.8–7.7)
NEUTROPHILS NFR BLD AUTO: 62 % (ref 31–73)
PLATELET # BLD AUTO: 312 X10^3/UL (ref 140–400)
POTASSIUM SERPL-SCNC: 4 MMOL/L (ref 3.5–5.1)
PROT SERPL-MCNC: 7 G/DL (ref 6.4–8.2)
PROTHROMBIN TIME: 13 SEC (ref 11.7–14)
RBC # BLD AUTO: 3.7 X10^6/UL (ref 4.3–5.7)
SODIUM SERPL-SCNC: 142 MMOL/L (ref 136–145)
WBC # BLD AUTO: 5.9 X10^3/UL (ref 4–11)

## 2021-06-15 PROCEDURE — 80053 COMPREHEN METABOLIC PANEL: CPT

## 2021-06-15 PROCEDURE — 99407 BEHAV CHNG SMOKING > 10 MIN: CPT

## 2021-06-15 PROCEDURE — 96375 TX/PRO/DX INJ NEW DRUG ADDON: CPT

## 2021-06-15 PROCEDURE — 36415 COLL VENOUS BLD VENIPUNCTURE: CPT

## 2021-06-15 PROCEDURE — 84484 ASSAY OF TROPONIN QUANT: CPT

## 2021-06-15 PROCEDURE — G0379 DIRECT REFER HOSPITAL OBSERV: HCPCS

## 2021-06-15 PROCEDURE — 83735 ASSAY OF MAGNESIUM: CPT

## 2021-06-15 PROCEDURE — 84443 ASSAY THYROID STIM HORMONE: CPT

## 2021-06-15 PROCEDURE — 80061 LIPID PANEL: CPT

## 2021-06-15 PROCEDURE — 85025 COMPLETE CBC W/AUTO DIFF WBC: CPT

## 2021-06-15 PROCEDURE — 93005 ELECTROCARDIOGRAM TRACING: CPT

## 2021-06-15 PROCEDURE — 85610 PROTHROMBIN TIME: CPT

## 2021-06-15 PROCEDURE — 99291 CRITICAL CARE FIRST HOUR: CPT

## 2021-06-15 PROCEDURE — 71045 X-RAY EXAM CHEST 1 VIEW: CPT

## 2021-06-15 PROCEDURE — 85730 THROMBOPLASTIN TIME PARTIAL: CPT

## 2021-06-15 PROCEDURE — G0378 HOSPITAL OBSERVATION PER HR: HCPCS

## 2021-06-15 PROCEDURE — 96374 THER/PROPH/DIAG INJ IV PUSH: CPT

## 2021-06-15 PROCEDURE — 83880 ASSAY OF NATRIURETIC PEPTIDE: CPT

## 2021-06-15 RX ADMIN — PREGABALIN SCH MG: 75 CAPSULE ORAL at 23:15

## 2021-06-15 NOTE — EKG
[facility]

              [address] [place] [address]

Test Date:    2021-06-15               Test Time:    20:14:47

Pat Name:     JOB RIOS           Department:   

Patient ID:   [id number]           Room:          

Gender:       M                        Technician:   

:          1960               Requested By: TAMICA DUMONT

Order Number: [id number]           Reading MD:     

                                 Measurements

Intervals                              Axis          

Rate:         89                       P:            

OR:                                    QRS:          200

QRSD:         158                      T:            23

QT:           416                                    

QTc:          514                                    

                           Interpretive Statements

IRREGULAR RHYTHM, NO P-WAVE FOUND

VENTRICULAR PREMATURE COMPLEX(ES)

ABNORMAL RIGHT SUPERIOR AXIS DEVIATION

NON SPECIFIC INTRAVENTRICULAR BLOCK

QRS(T) CONTOUR ABNORMALITY

CONSISTENT WITH HIGH LATERAL INFARCT

AGE UNDETERMINED

CONSIDER INFERIOR INFARCT

ABNORMAL ECG

RI6.02

No previous ECG available for comparison

## 2021-06-15 NOTE — RAD
EXAM: CHEST 1 VIEW 



History: Chest pain 



COMPARISON: 5/26/2021



TECHNIQUE: Single portable radiograph of the chest



FINDINGS:  Mild cardiomegaly. The lungs are clear bilaterally. Left-sided cardiac pacer AICD.



IMPRESSION:  No radiographic evidence of an acute cardiopulmonary process.

 



Electronically signed by: Soham Worley MD (6/15/2021 8:06 PM) [id number]

## 2021-06-15 NOTE — PHYS DOC
Past Medical History


Past Medical History:  Asthma, CAD, CHF, CVA, High Cholesterol, Hypertension, 

MI, Other


Additional Past Medical Histor:  VTACH,IV Drug abuse-Methamphetamine


Past Surgical History:  Angioplasty, Pacemaker, Other


Additional Past Surgical Histo:  15 cardiac stents, multiple cardiac 

catheterizations,WITH DEFIB


Smoking Status:  Current Every Day Smoker


Alcohol Use:  Rarely


Drug Use:  Methamphetamine





General Adult


EDM:


Chief Complaint:  CHEST PAIN





HPI:


HPI:





Patient is a 61-year-old male presenting via EMS for chest pain.  Onset was 

approximately 45 minutes prior to arrival while patient was walking around 

[place].  Reports substernal pain with intermittent radiation to 

bilateral shoulders and left jaw.  Patient took x3 sublingual nitros with 

temporary relief.  Nothing known makes worse.  Timing of symptoms has been 

constant since onset and worsened in intensity versus prior episodes of chest 

pain.  States he has significant cardiac history, has pacemaker/defibrillator 

present without any feelings of it firing.  Has history of numerous MIs, reports

having x15 stents.  He is on 81 mg of aspirin daily, did not take any today.  

Patient was evaluated and hemodynamically stable by EMS, he was given 325 mg 

aspirin and transported to our facility for evaluation.  On arrival, patient 

reports ongoing 8/10 severity chest pain.  Admits tobacco use smoking half a 

pack of cigarettes daily, no alcohol use, has distant history of illicit drug 

abuse but nothing recently per patient.





Review of Systems:


Review of Systems:


Fourteen body systems of review of systems have been reviewed. See HPI for 

pertinent positives and negative responses, other wise all other systems are 

negative, non-pertinent or non-contributory





Heart Score:


C/O Chest Pain:  Yes


HEART Score for Chest Pain:  








HEART Score for Chest Pain Response (Comments) Value


 


History Highly Suspicious 2


 


ECG Nonspecific Repolarizatio 1


 


Age >45 - < 65 1


 


Risk Factors >3 Risk Factors or Hx CAD 2


 


Total  6








Risk Factors:


Risk Factors:  DM, Current or recent (<one month) smoker, HTN, HLP, family 

history of CAD, obesity.


Risk Scores:


Score 0 - 3:  2.5% MACE over next 6 weeks - Discharge Home


Score 4 - 6:  20.3% MACE over next 6 weeks - Admit for Clinical Observation


Score 7 - 10:  72.7% MACE over next 6 weeks - Early Invasive Strategies





Allergies:


Allergies:





Allergies








Coded Allergies Type Severity Reaction Last Updated Verified


 


  acetaminophen Allergy Intermediate  10/8/20 Yes


 


  albuterol Adverse Reaction Intermediate  1/22/20 Yes


 


  ibuprofen Adverse Reaction Intermediate Nausea and Vomiting 1/22/20 Yes











Physical Exam:


PE:


Constitutional: Age-appropriate, appears in moderate distress due to pain and 

anxious, poor hygiene


HENT: Normocephalic, atraumatic, bilateral external ears normal, oropharynx dry 

with poor dentition, no oral exudates, nose normal. 


Eyes: PERRLA, EOMI, conjunctiva normal, no discharge.  


Neck: Normal range of motion, no tenderness, supple, no stridor.  


Cardiovascular: Heart rate regular, sinus rhythm, no murmurs rubs or gallops


Lungs & Thorax:  Bilateral breath sounds clear to auscultation 


Abdomen: Bowel sounds normal, soft, no tenderness, no masses, no pulsatile 

masses.  Nonsurgical abdomen, no peritoneal signs


Skin: Warm, dry, no erythema, no rash.  


Back: No tenderness, no CVA tenderness.  


Extremities: No tenderness, no cyanosis, no clubbing, ROM intact, no edema.  


Neurologic: Alert and oriented X 3, grossly normal motor & sensory function, no 

focal deficits noted. 


Psychologic: Anxious affect and mood





Current Patient Data:


Labs:





Laboratory Tests








Test


 6/15/21


19:40


 


White Blood Count 5.9 x10^3/uL 


 


Red Blood Count 3.70 x10^6/uL 


 


Hemoglobin 11.0 g/dL 


 


Hematocrit 32.5 % 


 


Mean Corpuscular Volume 88 fL 


 


Mean Corpuscular Hemoglobin 30 pg 


 


Mean Corpuscular Hemoglobin


Concent 34 g/dL 





 


Red Cell Distribution Width 16.5 % 


 


Platelet Count 312 x10^3/uL 


 


Neutrophils (%) (Auto) 62 % 


 


Lymphocytes (%) (Auto) 22 % 


 


Monocytes (%) (Auto) 11 % 


 


Eosinophils (%) (Auto) 3 % 


 


Basophils (%) (Auto) 1 % 


 


Neutrophils # (Auto) 3.7 x10^3/uL 


 


Lymphocytes # (Auto) 1.3 x10^3/uL 


 


Monocytes # (Auto) 0.7 x10^3/uL 


 


Eosinophils # (Auto) 0.2 x10^3/uL 


 


Basophils # (Auto) 0.1 x10^3/uL 


 


Prothrombin Time 13.0 SEC 


 


Prothromb Time International


Ratio 1.0 





 


Activated Partial


Thromboplast Time 40 SEC 





 


Sodium Level 142 mmol/L 


 


Potassium Level 4.0 mmol/L 


 


Chloride Level 105 mmol/L 


 


Carbon Dioxide Level 23 mmol/L 


 


Anion Gap 14 


 


Blood Urea Nitrogen 10 mg/dL 


 


Creatinine 0.9 mg/dL 


 


Estimated GFR


(Cockcroft-Gault) 85.8 





 


BUN/Creatinine Ratio 11 


 


Glucose Level 98 mg/dL 


 


Calcium Level 8.6 mg/dL 


 


Magnesium Level 1.9 mg/dL 


 


Total Bilirubin 0.2 mg/dL 


 


Aspartate Amino Transf


(AST/SGOT) 12 U/L 





 


Alanine Aminotransferase


(ALT/SGPT) 18 U/L 





 


Alkaline Phosphatase 95 U/L 


 


Troponin I Quantitative < 0.017 ng/mL 


 


NT-Pro-B-Type Natriuretic


Peptide 645 pg/mL 





 


Total Protein 7.0 g/dL 


 


Albumin 3.5 g/dL 


 


Albumin/Globulin Ratio 1.0 








Current Medications








 Medications


  (Trade)  Dose


 Ordered  Sig/Gwen


 Route


 PRN Reason  Start Time


 Stop Time Status Last Admin


Dose Admin


 


 Fentanyl Citrate


  (Fentanyl 2ml


 Vial)  50 mcg  1X  ONCE


 IVP


   6/15/21 19:45


 6/15/21 19:46 DC 6/15/21 19:47











Vital Signs:





Vital Signs








  Date Time  Temp Pulse Resp B/P (MAP) Pulse Ox O2 Delivery O2 Flow Rate FiO2


 


6/15/21 19:27 98.1 36 24 120/77 (91) 100 Room Air  





 98.1       








Vital Signs








  Date Time  Temp Pulse Resp B/P (MAP) Pulse Ox O2 Delivery O2 Flow Rate FiO2


 


6/15/21 19:47     99 Room Air  


 


6/15/21 19:27 98.1 36 24 120/77 (91)    





 98.1       











EKG:


EKG:


EKG ordered and interpreted by myself at 1930 hrs. and later confirmed by car

diologist as sinus rhythm at 94 bpm, prolonged QRS at 154 prolonged QTC at 504 

otherwise unremarkable intervals, left axis deviation, left bundle branch block 

that is reported as old per patient, T wave inversions noted in leads aVL, V5 

and V6, no STEMI


This EKG was reviewed viewed and compared to prior obtained 2/4/2021 as a 

ventricular paced rhythm at 110 bpm, prolonged QRS at 148 and prolonged QTC at 

510 otherwise unremarkable intervals, left axis deviation, T wave inversions 

noted in leads I and aVL, LBB present, no STEMI


Repeat EKG ordered and interpreted by myself at 2019 hrs. as sinus rhythm with 

occasional PVCs at 89 bpm, prolonged QRS at 158 and prolonged QTC at 514 

otherwise unremarkable intervals, right axis deviation, no STEMI





Radiology/Procedures:


Radiology/Procedures:








EXAM: CHEST 1 VIEW 





History: Chest pain 





COMPARISON: 5/26/2021





TECHNIQUE: Single portable radiograph of the chest





FINDINGS:  Mild cardiomegaly. The lungs are clear bilaterally. Left-sided card

iac pacer AICD.





IMPRESSION:  No radiographic evidence of an acute cardiopulmonary process.


 





Electronically signed by: Soham Worley MD (6/15/2021 8:06 PM) [id number]





Course & Med Decision Making:


Course & Med Decision Making


Vitals unremarkable.  Initially reported STEMI equivalent due to new onset left 

bundle branch block.  EKG reviewed on arrival with on-call cardiologist, no 

STEMI and appears LBBB is old after comparison with prior EKG


325 mg aspirin administered in route.  Patient self administered x3 sublingual 

nitro prior to arrival.  75 mcg fentanyl administered on arrival with 

improvement of pain


Comprehensive ER work-up obtained with unremarkable subsequent EKG and negative 

troponin.  I reviewed heart score with patient and discussed my concern given 

his history of cardiac events


I contacted on-call hospitalist and reviewed case at length, he agreed need for 

admission and accepted patient under his care to telemetry unit.  I disclosed 

patient pending UDS at time of hospital admission


I updated patient on proposed plan of care that included hospital admission.  

All questions and concerns addressed prior to ER admission.  Reports pain 

improved from 9/10 severity to 6/10 in severity.  4 mg IV morphine administered 

with near complete resolution of pain prior to transport to floor for admission











Critical Care Time


This patient required critical care. Due to the fact that the patient required a

significant amount of one on one physician - patient contact time, ordering and 

review of studies, arranging urgent treatment with development of a management 

plan, evaluation of patients response to treatment with frequent reassessments,

and discussions with other providers this patient required 45 minutes of 

critical care time. Critical care time was indicated due to the inherent 

instability and/or potential for instability in this patient. The critical care 

time that is allocated to this patient is above and beyond any time spent on any

other billable procedures performed on this patient.





Dragon Disclaimer:


Dragon Disclaimer:


This electronic medical record was generated, in whole or in part, using a voice

recognition dictation system.





Departure


Departure


Impression:  


   Primary Impression:  


   Chest pain


   Additional Impressions:  


   History of MI (myocardial infarction)


   History of illicit drug use


   Pacemaker


Disposition:  09 ADMITTED AS INPATIENT


Admitting Physician:  WILFRED ([facility])


Condition:  STABLE


Referrals:  


NO PCP (PCP)











TAMICA DUMONT DO                 Anastacio 15, 2021 19:45

## 2021-06-15 NOTE — EKG
[facility]

              [address] [place] [address]

Test Date:    2021-06-15               Test Time:    19:27:46

Pat Name:     JOB RIOS           Department:   

Patient ID:   [id number]           Room:          

Gender:       M                        Technician:   

:          1960               Requested By: TAMICA DUMONT

Order Number: [id number]           Reading MD:     

                                 Measurements

Intervals                              Axis          

Rate:         94                       P:            53

AR:           126                      QRS:          -9

QRSD:         154                      T:            157

QT:           398                                    

QTc:          504                                    

                           Interpretive Statements

SINUS RHYTHM

Compared to ECG 2011 03:36:05

Left-axis deviation now present

Left bundle-branch block now present

Ventricular premature complex(es) no longer present

Myocardial infarct finding no longer present

T-wave abnormality no longer present

Possible ischemia no longer present

ST (T wave) deviation no longer present

## 2021-06-16 VITALS — DIASTOLIC BLOOD PRESSURE: 74 MMHG | SYSTOLIC BLOOD PRESSURE: 102 MMHG

## 2021-06-16 VITALS — SYSTOLIC BLOOD PRESSURE: 110 MMHG | DIASTOLIC BLOOD PRESSURE: 61 MMHG

## 2021-06-16 VITALS — SYSTOLIC BLOOD PRESSURE: 126 MMHG | DIASTOLIC BLOOD PRESSURE: 75 MMHG

## 2021-06-16 VITALS — DIASTOLIC BLOOD PRESSURE: 60 MMHG | SYSTOLIC BLOOD PRESSURE: 104 MMHG

## 2021-06-16 LAB
CHOLEST SERPL-MCNC: 127 MG/DL (ref 0–200)
CHOLEST/HDLC SERPL: 4.7 {RATIO}
HDLC SERPL-MCNC: 27 MG/DL (ref 40–60)
LDLC: 74 MG/DL (ref 0–100)
TRIGL SERPL-MCNC: 129 MG/DL (ref 0–150)
VLDLC: 26 MG/DL (ref 0–40)

## 2021-06-16 RX ADMIN — PREGABALIN SCH MG: 75 CAPSULE ORAL at 10:55

## 2021-06-16 NOTE — NUR
Discharge Note:



LEFTY RIOS



Discharge instructions and discharge home medications reviewed with Patient and a copy 
given. All questions have been answered and understanding verbalized. Pt sent via cab pass 
in stable condition.

## 2021-06-16 NOTE — NUR
Patient refuses all nebulizer treatments.  He will only take Spiriva, will not accept a 
substitute.  RN informed.

## 2021-06-16 NOTE — NUR
SS following for discharge planning. SS reviewed pt chart and discussed with pt RN. Pt is 
from home and is currently on room air. Pt refusing UA. Discharge plan is to home when 
medically ready. Pt requesting transportation to home. SS will continue to follow for 
discharge planning.

## 2021-06-16 NOTE — PDOC1
History and Physical


Date of Service:


DOS:


DATE: 6/16/21 


TIME: 08:17





Chief Complaint:


Chief Complain:


Chest pain.





History of Present Illness:


HPI:








61-year-old male presenting via EMS for chest pain.  Onset was approximately 45 

minutes prior to arrival while patient was walking around [email or web address] [place].  

Reports substernal pain with intermittent radiation to bilateral shoulders and 

left jaw.  Patient took x3 sublingual nitros with temporary relief.  Nothing 

known makes worse.  Timing of symptoms has been constant since onset and 

worsened in intensity versus prior episodes of chest pain.  States he has 

significant cardiac history, has pacemaker/defibrillator present without any 

feelings of it firing.  Has history of numerous MIs, reports having x15 stents. 

He is on 81 mg of aspirin daily, did not take any today.  Patient was evaluated 

and hemodynamically stable by EMS, he was given 325 mg aspirin and transported 

to our facility for evaluation.  On arrival, patient reports ongoing 8/10 

severity chest pain.  Admits tobacco use smoking half a pack of cigarettes 

daily, no alcohol use, has distant history of illicit drug abuse but nothing 

recently per patient.





Past Medical/Surgical History:


PMH/PSH:


Past Medical History:  Asthma, CAD, CHF, CVA, High Cholesterol, Hypertension, 

MI,  VTACH,IV Drug abuse-Methamphetamine


Past Surgical History:  Angioplasty, Pacemaker, 15 cardiac stents, multiple 

cardiac catheterizations,WITH DEFIB














Allergies:


Allergies:  


Coded Allergies:  


     acetaminophen (Verified  Allergy, Intermediate, 10/8/20)


     albuterol (Verified  Adverse Reaction, Intermediate, 1/22/20)


   Patient states "it speeds my heart up too much"


     ibuprofen (Verified  Adverse Reaction, Intermediate, Nausea and Vomiting, 

1/22/20)





Family History:


Family History:


Reviewed with no relevant findings





Social History:


Social History:


Smoking Status:  Current Every Day Smoker


Alcohol Use:  Rarely


Drug Use:  Methamphetamine





Current Medications:


Current Medications





Current Medications


Fentanyl Citrate (Fentanyl 2ml Vial) 50 mcg 1X  ONCE IVP  Last administered on 

6/15/21at 19:47;  Start 6/15/21 at 19:45;  Stop 6/15/21 at 19:46;  Status DC


Morphine Sulfate (Morphine Sulfate) 4 mg PRN Q2HR  PRN IV PAIN;  Start 6/15/21 

at 20:45;  Stop 6/16/21 at 20:44


Nitroglycerin (Nitrostat) 0.4 mg PRN Q5MIN  PRN SL CHEST PAIN;  Start 6/15/21 at

20:45;  Stop 6/16/21 at 20:44


Morphine Sulfate (Morphine Sulfate) 4 mg 1X  ONCE IV  Last administered on 

6/15/21at 21:06;  Start 6/15/21 at 21:00;  Stop 6/15/21 at 21:01;  Status DC


Aspirin (Aspirin Chewable) 81 mg DAILY08 PO ;  Start 6/16/21 at 08:00


Clopidogrel Bisulfate (Plavix) 75 mg DAILY08 PO ;  Start 6/16/21 at 08:00


Metoprolol Succinate (Toprol Xl) 12.5 mg DAILY PO ;  Start 6/16/21 at 09:00


Nitroglycerin (Nitrostat) 0.4 mg PRN Q5MIN  PRN SL CHEST PAIN;  Start 6/15/21 at

22:45


Pregabalin (Lyrica) 300 mg BID PO  Last administered on 6/15/21at 23:15;  Start 

6/15/21 at 23:00


Atorvastatin Calcium (Lipitor) 80 mg QHS PO  Last administered on 6/15/21at 

23:14;  Start 6/15/21 at 23:00


Albuterol/ Ipratropium (Duoneb) 3 ml RTQID NEB ;  Start 6/16/21 at 08:00


Lorazepam (Ativan Inj) 1 mg 1X  ONCE IVP  Last administered on 6/15/21at 23:16; 

Start 6/15/21 at 23:15;  Stop 6/15/21 at 23:16;  Status DC





Active Scripts


Active


Aspirin 81 Mg Tab.chew 1 Tab PO DAILY


Reported


Crestor (Rosuvastatin Calcium) 40 Mg Tablet 40 Mg PO HS


Metoprolol Succinate ( Xl ) (Metoprolol Succinate) 25 Mg Tab.er.24h 12.5 Mg PO 

DAILY


Spiriva (Tiotropium [place]) 18 Mcg Cap.w.dev 1 Cap IH DAILY


NITROGLYCERIN SubLingual (Nitroglycerin) 0.4 Mg Tab.subl 0.4 Mg SL PRN Q5MIN PRN


Lyrica (Pregabalin) 300 Mg Capsule 1 Cap PO BID


Plavix (Clopidogrel Bisulfate) 75 Mg Tablet 75 Mg PO DAILY





ROS:


Review of Systems


Review of System


REVIEW OF SYSTEMS:


GENERAL:  Denies weakness


SKIN:  No bruising, hair changes or rashes.


EYES:  No blurred, double or loss of vision.


NOSE AND THROAT:  No history of nosebleeds, hoarseness or sore throat.


HEART:  No history of palpitations, chest pain or shortness of breath on


exertion.


LUNGS:  Denies cough, hemoptysis, wheezing or shortness of breath.


GASTROINTESTINAL:  Denies changes in appetite, nausea, vomiting, diarrhea or


constipation.


GENITOURINARY:  No history of frequency, urgency, hesitancy or nocturia.


NEUROLOGIC:  Denies history of numbness, tingling, or tremor.


PSYCHIATRIC:  No history of panic, anxiety or depression.


ENDOCRINE:  No history of heat or cold intolerance, polyuria or polydipsia.


EXTREMITIES:  Denies joint pain, pain on walking or stiffness.





Physical Exam:


Vital Signs:





Vital Signs








  Date Time  Temp Pulse Resp B/P (MAP) Pulse Ox O2 Delivery O2 Flow Rate FiO2


 


6/16/21 07:35 98.3 77 18 110/61 (77) 96 Room Air  





 98.3       








Physcial Exam:


GEN:   No apparent distress.  Alert and oriented


HEENT:   Normal cephalic, atraumatic, external auditory canals are patent


EYES:   Extraocular muscles are intact, pupil are equally round and reactive to 

light and accommodation


MUSCULOSKELETAL:  Well developed , well nourished, good range of motion


ENDOCRINE:   No thyromegaly was palpated


LYMPHATICS:   No cervical chain or axillary nodes were noted


HEMATOPOIETIC:  No bruising


NECK:   Supple, no JVD, no thyromegaly was noted


LUNGS:   Clear to auscultation in all lung fields without rhonchi or wheezing


HEART:    RRR, S!, S2 present.  Peripheral pulses intact, no obvious murmurs 

noted


ABDOMEN:   Soft, nontender.  Positive bowel sounds, no organomegaly, normal 

bowel sounds


EXTREMITIES:   Without clubbing, cyanosis, or edema.  Pedal pulses intact.  

Negative Homans sign


NEUROLOGIC:   Normal speech and tone.  A&O x 3, moves all extremities, no 

obvious focal deficits


PSYCHIATRIC:   Normal affect, normal mood. Stable


SKIN:   No ulcerations or rashes, good skin turgor, no jaundice


VASCULAR:   Good capillary refill, neurovascular bundle appears to be intact





Labs:


Labs:





Laboratory Tests








Test


 6/15/21


19:40 6/15/21


22:30 6/16/21


06:10


 


White Blood Count


 5.9 x10^3/uL


(4.0-11.0) 


 





 


Red Blood Count


 3.70 x10^6/uL


(4.30-5.70) 


 





 


Hemoglobin


 11.0 g/dL


(13.0-17.5) 


 





 


Hematocrit


 32.5 %


(39.0-53.0) 


 





 


Mean Corpuscular Volume 88 fL ()   


 


Mean Corpuscular Hemoglobin 30 pg (25-35)   


 


Mean Corpuscular Hemoglobin


Concent 34 g/dL


(31-37) 


 





 


Red Cell Distribution Width


 16.5 %


(11.5-14.5) 


 





 


Platelet Count


 312 x10^3/uL


(140-400) 


 





 


Neutrophils (%) (Auto) 62 % (31-73)   


 


Lymphocytes (%) (Auto) 22 % (24-48)   


 


Monocytes (%) (Auto) 11 % (0-9)   


 


Eosinophils (%) (Auto) 3 % (0-3)   


 


Basophils (%) (Auto) 1 % (0-3)   


 


Neutrophils # (Auto)


 3.7 x10^3/uL


(1.8-7.7) 


 





 


Lymphocytes # (Auto)


 1.3 x10^3/uL


(1.0-4.8) 


 





 


Monocytes # (Auto)


 0.7 x10^3/uL


(0.0-1.1) 


 





 


Eosinophils # (Auto)


 0.2 x10^3/uL


(0.0-0.7) 


 





 


Basophils # (Auto)


 0.1 x10^3/uL


(0.0-0.2) 


 





 


Prothrombin Time


 13.0 SEC


(11.7-14.0) 


 





 


Prothromb Time International


Ratio 1.0 (0.8-1.1) 


 


 





 


Activated Partial


Thromboplast Time 40 SEC (24-38) 


 


 





 


Sodium Level


 142 mmol/L


(136-145) 


 





 


Potassium Level


 4.0 mmol/L


(3.5-5.1) 


 





 


Chloride Level


 105 mmol/L


() 


 





 


Carbon Dioxide Level


 23 mmol/L


(21-32) 


 





 


Anion Gap 14 (6-14)   


 


Blood Urea Nitrogen


 10 mg/dL


(8-26) 


 





 


Creatinine


 0.9 mg/dL


(0.7-1.3) 


 





 


Estimated GFR


(Cockcroft-Gault) 85.8 


 


 





 


BUN/Creatinine Ratio 11 (6-20)   


 


Glucose Level


 98 mg/dL


(70-99) 


 





 


Calcium Level


 8.6 mg/dL


(8.5-10.1) 


 





 


Magnesium Level


 1.9 mg/dL


(1.8-2.4) 


 





 


Total Bilirubin


 0.2 mg/dL


(0.2-1.0) 


 





 


Aspartate Amino Transf


(AST/SGOT) 12 U/L (15-37) 


 


 





 


Alanine Aminotransferase


(ALT/SGPT) 18 U/L (16-63) 


 


 





 


Alkaline Phosphatase


 95 U/L


() 


 





 


Troponin I Quantitative


 < 0.017 ng/mL


(0.000-0.055) < 0.017 ng/mL


(0.000-0.055) < 0.017 ng/mL


(0.000-0.055)


 


NT-Pro-B-Type Natriuretic


Peptide 645 pg/mL


(0-124) 


 





 


Total Protein


 7.0 g/dL


(6.4-8.2) 


 





 


Albumin


 3.5 g/dL


(3.4-5.0) 


 





 


Albumin/Globulin Ratio 1.0 (1.0-1.7)   








Laboratory Tests








Test


 6/15/21


19:40 6/15/21


22:30 6/16/21


06:10


 


White Blood Count


 5.9 x10^3/uL


(4.0-11.0) 


 





 


Red Blood Count


 3.70 x10^6/uL


(4.30-5.70) 


 





 


Hemoglobin


 11.0 g/dL


(13.0-17.5) 


 





 


Hematocrit


 32.5 %


(39.0-53.0) 


 





 


Mean Corpuscular Volume 88 fL ()   


 


Mean Corpuscular Hemoglobin 30 pg (25-35)   


 


Mean Corpuscular Hemoglobin


Concent 34 g/dL


(31-37) 


 





 


Red Cell Distribution Width


 16.5 %


(11.5-14.5) 


 





 


Platelet Count


 312 x10^3/uL


(140-400) 


 





 


Neutrophils (%) (Auto) 62 % (31-73)   


 


Lymphocytes (%) (Auto) 22 % (24-48)   


 


Monocytes (%) (Auto) 11 % (0-9)   


 


Eosinophils (%) (Auto) 3 % (0-3)   


 


Basophils (%) (Auto) 1 % (0-3)   


 


Neutrophils # (Auto)


 3.7 x10^3/uL


(1.8-7.7) 


 





 


Lymphocytes # (Auto)


 1.3 x10^3/uL


(1.0-4.8) 


 





 


Monocytes # (Auto)


 0.7 x10^3/uL


(0.0-1.1) 


 





 


Eosinophils # (Auto)


 0.2 x10^3/uL


(0.0-0.7) 


 





 


Basophils # (Auto)


 0.1 x10^3/uL


(0.0-0.2) 


 





 


Prothrombin Time


 13.0 SEC


(11.7-14.0) 


 





 


Prothromb Time International


Ratio 1.0 (0.8-1.1) 


 


 





 


Activated Partial


Thromboplast Time 40 SEC (24-38) 


 


 





 


Sodium Level


 142 mmol/L


(136-145) 


 





 


Potassium Level


 4.0 mmol/L


(3.5-5.1) 


 





 


Chloride Level


 105 mmol/L


() 


 





 


Carbon Dioxide Level


 23 mmol/L


(21-32) 


 





 


Anion Gap 14 (6-14)   


 


Blood Urea Nitrogen


 10 mg/dL


(8-26) 


 





 


Creatinine


 0.9 mg/dL


(0.7-1.3) 


 





 


Estimated GFR


(Cockcroft-Gault) 85.8 


 


 





 


BUN/Creatinine Ratio 11 (6-20)   


 


Glucose Level


 98 mg/dL


(70-99) 


 





 


Calcium Level


 8.6 mg/dL


(8.5-10.1) 


 





 


Magnesium Level


 1.9 mg/dL


(1.8-2.4) 


 





 


Total Bilirubin


 0.2 mg/dL


(0.2-1.0) 


 





 


Aspartate Amino Transf


(AST/SGOT) 12 U/L (15-37) 


 


 





 


Alanine Aminotransferase


(ALT/SGPT) 18 U/L (16-63) 


 


 





 


Alkaline Phosphatase


 95 U/L


() 


 





 


Troponin I Quantitative


 < 0.017 ng/mL


(0.000-0.055) < 0.017 ng/mL


(0.000-0.055) < 0.017 ng/mL


(0.000-0.055)


 


NT-Pro-B-Type Natriuretic


Peptide 645 pg/mL


(0-124) 


 





 


Total Protein


 7.0 g/dL


(6.4-8.2) 


 





 


Albumin


 3.5 g/dL


(3.4-5.0) 


 





 


Albumin/Globulin Ratio 1.0 (1.0-1.7)   











Images:


Images


CXR





Impression: 


1.  No acute cardiopulmonary process.





Assessment/Plan


Assessment/Plan


Chest pain concerning for unstable angina/NSTEMI


Tobacco misuse


With amphetamine use in the past














HANSA =


EKG showing Irregular rhythm with no P waves


Ventricular PVCs


Old LBBB


Troponin negative x3


Continue aspirin, consider Plavix if intermediate risk will defer this to 

cardiology


Cardiology consulted for predischarge stress testing or left heart cath


Continue nitroglycerin as needed for pain


Continue beta-blocker if blood pressures allow


Continue high intensity statins


IV morphine as needed


Consider Lovenox


Maintain O2 sats between 88 to 95%


Trend troponins


Repeat EKG in the a.m.


Continue telemetry monitoring


Monitor for electrolyte abnormalities


Avoid NSAIDs











Smoking cessation:  Total time spent was 12 minutes in face to face counseling. 

Patient has agreed to consider nicotine patches/gum or to start on Varnicline 

when discharged





Justifications for Admission


Other Justification


ACS











JITENDRA GOLDEN MD                  Jun 16, 2021 08:25

## 2021-06-16 NOTE — NUR
The patient, JOB RIOS, 62 y/o, M admitted by TRU TREVINO MD, alert and oriented 
x4. pt appeared very agitated and angry. pt was verbally aggressive ,uncooperative with 
cares. yelling at staff, this RN initiated a code grey. afterwards pt was very apologetic. 
pt was given written information regarding hospital policies, unit procedures and contact 
persons. Valuables were checked and documented in the emar.

## 2021-06-16 NOTE — DISCH
DISCHARGE INSTRUCTIONS


Condition on Discharge


Condition on Discharge:  Guarded





Activity After Discharge


Activity Instructions for Disc:  Activity as tolerated


Bathing Instructions:  No Tub Bath until see 


Lifting Instructions after Dis:  No heavy lifting, No pulling or pushing, Do not

lift >10 pounds


Exercise Instruction after Dis:  Progress as tolerated


Driving Instructions after Dis:  Do not drive today


Weight Bearing Status after Di:  As tolerated





Diet after Discharge


Diet after Discharge:  Cardiac


Diet Texture:  Regular


Liquid Texture:  Thin Liquid


Swallowing Supervision:  None needed





Wound Incision Care


Wound/Incision Care:  No wound care needed





Checks after Discharge


Checks after discharge:  Check blood press - daily, Check your Temp as needed, 

Weigh Yourself Daily





Contacting the DRManjeet after DC


Call your doctor for:  If your condition worsens





Follow-Up


Follow up with:  PCP within 2 weeks of discharge


Follow Up With:  Cardiology as needed





Treatment/Equipment after DC


Adaptive Equipment Issued:  None











JITENDRA GOLDEN MD                  Jun 16, 2021 11:22

## 2021-06-16 NOTE — PDOC2
ECHO PACHECO APRN 6/16/21 1037:


CARDIAC CONSULT


DATE OF CONSULT


Date of Consult


DATE: 6/16/21 


TIME: 10:14





REASON FOR CONSULT


Reason for Consult:


Chest pain, past stents





REFERRING PHYSICIAN


Referring Physician:


Emanuel





SOURCE


Source:  Chart review, Patient





HISTORY OF PRESENT ILLNESS


HISTORY OF PRESENT ILLNESS


This is a 62 yo male admitted for complains of chest pain. He resides at the 

[facility] and comes over here at times for job and currently told me that he is 

visiting someone. 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  No nausea or 

vomiting and no peripheral edema or SOA. No fever or chills. He was walking at 

[facility] when started having chest pain and took 3 NTG. He is significant for ICM

with AICD and no treatment was felt, also has multiple stents and had recent LHC

with no intervention but does have  to OM and nondominant RCA otherwise 

patent stents. He needs antianginals but does not want to take any due to being 

low in BP at times. I tried to talk to him about it but he does not even want to

bring it up. His medications includes, ASA, statoin, lisinopril and metoprolol. 

His cardiologist is at [facility] whom he saw 2 months. ago.





PAST MEDICAL HISTORY


Past Medical History


Cardiovascular:  CAD, CHF (ICM), HTN, Hyperlipidemia, Other (VT)


Pulmonary:  Asthma


CENTRAL NERVOUS SYSTEM:  CVA


GI:  GERD


Heme/Onc:  No pertinent hx


Hepatobiliary:  No pertinent hx


Psych:  Anxiety


Musculoskeletal:  Osteoarthritis


Rheumatologic:  No pertinent hx


Infectious disease:  No pertinent hx


ENT:  No pertinent hx


Renal/:  No pertinent hx


Endocrine:  No pertinent hx


Dermatology:  No pertinent hx





PAST SURGICAL HISTORY


Past Surgical History


Pacemaker (AICD), Other (multiple PCI/stents.





FAMILY HISTORY


Family History:  Hypertension





SOCIAL HISTORY


Social History


Smoke:  <1 pack per day


ALCOHOL:  none


Drugs:  H/o meth use; reports clean x2 months


Lives:  with Family





CURRENT MEDICATIONS


CURRENT MEDICATIONS





Current Medications








 Medications


  (Trade)  Dose


 Ordered  Sig/Gwen


 Route


 PRN Reason  Start Time


 Stop Time Status Last Admin


Dose Admin


 


 Fentanyl Citrate


  (Fentanyl 2ml


 Vial)  50 mcg  1X  ONCE


 IVP


   6/15/21 19:45


 6/15/21 19:46 DC 6/15/21 19:47





 


 Morphine Sulfate


  (Morphine


 Sulfate)  4 mg  1X  ONCE


 IV


   6/15/21 21:00


 6/15/21 21:01 DC 6/15/21 21:06





 


 Pregabalin


  (Lyrica)  300 mg  BID


 PO


   6/15/21 23:00


    6/15/21 23:15





 


 Atorvastatin


 Calcium


  (Lipitor)  80 mg  QHS


 PO


   6/15/21 23:00


    6/15/21 23:14





 


 Lorazepam


  (Ativan Inj)  1 mg  1X  ONCE


 IVP


   6/15/21 23:15


 6/15/21 23:16 DC 6/15/21 23:16














ALLERGIES


ALLERGIES:  


Coded Allergies:  


     acetaminophen (Verified  Allergy, Intermediate, 10/8/20)


     albuterol (Verified  Adverse Reaction, Intermediate, 1/22/20)


   Patient states "it speeds my heart up too much"


     ibuprofen (Verified  Adverse Reaction, Intermediate, Nausea and Vomiting, 

1/22/20)





ROS


Review of System


14 point ROS evaluated with pertinent positives noted per HPI





PHYSICAL EXAM


General:  Alert, Oriented X3, Cooperative, No acute distress


HEENT:  Atraumatic, Mucous membr. moist/pink


Lungs:  Clear to auscultation, Normal air movement


Heart:  Regular rate (SR), Normal S1, Normal S2, Other (3/6 systolic murmur to 

apex)


Abdomen:  Soft, No tenderness


Extremities:  No cyanosis, No edema


Skin:  No breakdown, No significant lesion


Neuro:  Normal speech, Sensation intact


Psych/Mental Status:  Mental status NL, Mood NL


MUSCULOSKELETAL:  Osteoarthritic changes both hands





VITALS/I&O


VITALS/I&O:





                                   Vital Signs








  Date Time  Temp Pulse Resp B/P (MAP) Pulse Ox O2 Delivery O2 Flow Rate FiO2


 


6/16/21 07:35 98.3 77 18 110/61 (77) 96 Room Air  





 98.3       














                                    I & O   


 


 6/15/21 6/15/21 6/16/21





 15:00 23:00 07:00


 


Intake Total   1120 ml


 


Balance   1120 ml











LABS


Lab:





                                Laboratory Tests








Test


 6/15/21


19:40 6/15/21


22:30 6/16/21


06:10


 


White Blood Count


 5.9 x10^3/uL


(4.0-11.0) 


 





 


Red Blood Count


 3.70 x10^6/uL


(4.30-5.70)  L 


 





 


Hemoglobin


 11.0 g/dL


(13.0-17.5)  L 


 





 


Hematocrit


 32.5 %


(39.0-53.0)  L 


 





 


Mean Corpuscular Volume


 88 fL ()


 


 





 


Mean Corpuscular Hemoglobin 30 pg (25-35)    


 


Mean Corpuscular Hemoglobin


Concent 34 g/dL


(31-37) 


 





 


Red Cell Distribution Width


 16.5 %


(11.5-14.5)  H 


 





 


Platelet Count


 312 x10^3/uL


(140-400) 


 





 


Neutrophils (%) (Auto) 62 % (31-73)    


 


Lymphocytes (%) (Auto) 22 % (24-48)  L  


 


Monocytes (%) (Auto) 11 % (0-9)  H  


 


Eosinophils (%) (Auto) 3 % (0-3)    


 


Basophils (%) (Auto) 1 % (0-3)    


 


Neutrophils # (Auto)


 3.7 x10^3/uL


(1.8-7.7) 


 





 


Lymphocytes # (Auto)


 1.3 x10^3/uL


(1.0-4.8) 


 





 


Monocytes # (Auto)


 0.7 x10^3/uL


(0.0-1.1) 


 





 


Eosinophils # (Auto)


 0.2 x10^3/uL


(0.0-0.7) 


 





 


Basophils # (Auto)


 0.1 x10^3/uL


(0.0-0.2) 


 





 


Prothrombin Time


 13.0 SEC


(11.7-14.0) 


 





 


Prothrombin Time INR 1.0 (0.8-1.1)    


 


Activated Partial


Thromboplast Time 40 SEC (24-38)


H 


 





 


Sodium Level


 142 mmol/L


(136-145) 


 





 


Potassium Level


 4.0 mmol/L


(3.5-5.1) 


 





 


Chloride Level


 105 mmol/L


() 


 





 


Carbon Dioxide Level


 23 mmol/L


(21-32) 


 





 


Anion Gap 14 (6-14)    


 


Blood Urea Nitrogen


 10 mg/dL


(8-26) 


 





 


Creatinine


 0.9 mg/dL


(0.7-1.3) 


 





 


Estimated GFR


(Cockcroft-Gault) 85.8  


 


 





 


BUN/Creatinine Ratio 11 (6-20)    


 


Glucose Level


 98 mg/dL


(70-99) 


 





 


Calcium Level


 8.6 mg/dL


(8.5-10.1) 


 





 


Magnesium Level


 1.9 mg/dL


(1.8-2.4) 


 





 


Total Bilirubin


 0.2 mg/dL


(0.2-1.0) 


 





 


Aspartate Amino Transferase


(AST) 12 U/L (15-37)


L 


 





 


Alanine Aminotransferase (ALT)


 18 U/L (16-63)


 


 





 


Alkaline Phosphatase


 95 U/L


() 


 





 


Troponin I Quantitative


 < 0.017 ng/mL


(0.000-0.055) < 0.017 ng/mL


(0.000-0.055) < 0.017 ng/mL


(0.000-0.055)


 


NT-Pro-B-Type Natriuretic


Peptide 645 pg/mL


(0-124)  H 


 





 


Total Protein


 7.0 g/dL


(6.4-8.2) 


 





 


Albumin


 3.5 g/dL


(3.4-5.0) 


 





 


Albumin/Globulin Ratio 1.0 (1.0-1.7)    


 


Triglycerides Level


 


 


 129 mg/dL


(0-150)


 


Cholesterol Level


 


 


 127 mg/dL


(0-200)


 


LDL Cholesterol, Calculated


 


 


 74 mg/dL


(0-100)


 


VLDL Cholesterol, Calculated


 


 


 26 mg/dL


(0-40)


 


Non-HDL Cholesterol Calculated


 


 


 100 mg/dL


(0-129)


 


HDL Cholesterol


 


 


 27 mg/dL


(40-60)  L


 


Cholesterol/HDL Ratio   4.7  


 


Thyroid Stimulating Hormone


(TSH) 


 


 7.019 uIU/mL


(0.358-3.74)  H





                                Laboratory Tests


6/15/21 19:40








                                Laboratory Tests


6/15/21 19:40











ECHOCARDIOGRAM


ECHOCARDIOGRAM


<Conclusion>


Left ventricle systolic function is moderately impaired. EF 30-35%.


There is severe global hypokinesis of the left ventricle.


Tissue Doppler imaging reveals moderate left ventricular diastolic dysfunction.


There is a pacemaker lead in the right ventricle.


Doppler and Color-flow revealed moderate mitral regurgitation.


Doppler and Color Flow revealed mild tricuspid regurgitation. There is moderate 

pulmonary hypertension. The PA pressure was estimated at 44 mmHg.





DATE:     07/10/20 0907





HEART CATH


HEART CATH


FINDINGS


1.  Hemodynamics: Left ventricular end-diastolic pressure 18 mmHg.  No pullback 

gradient across aortic valve.


2.  Left ventriculography: Severe left ventricular systolic dysfunction with 

ejection fraction estimated at 20 to 25%.  2+ mitral regurgitation seen.


3.  Coronary angiography:


a.  The left main coronary artery arose from the left sinus of Valsalva, gave 

rise to the left anterior descending and left circumflex arteries and did not 

show any significant stenosis.


b.  The left anterior descending artery showed patent long stented mid and 

distal segments with a very distal segment showing 40% in-stent restenosis.  

Stent in the diagonal branch was patent.


c.  The left circumflex artery was a large and dominant vessel.  The first 

obtuse marginal branch showed 100% chronic total occlusion within the stent in 

the proximal segment, described in prior cardiac catheterization.  The stent in 

the left posterior descending artery was patent.


d.  The right coronary artery was a small and nondominant vessel arising from 

the right sinus of Valsalva that showed 100% chronic total occlusion in the 

proximal segment, described in prior cardiac catheterization.





Conclusion


1.  Patent previously placed stents in the left anterior descending artery, 

diagonal branch and left posterior descending artery.  The obtuse marginal 

branch and nondominant right coronary artery showed 100% chronic total 

occlusions, described in prior cardiac catheterization.


2.  Severe left ventricular systolic dysfunction with ejection fraction 

estimated at 20 to 25% with 2+ mitral regurgitation.





Recommendations


Optimization of medical therapy for coronary disease and ischemic 

cardiomyopathy.





DATE:     10/09/20 1353





ASSESSMENT/PLAN


ASSESSMENT/PLAN


1.  Chest pain: suspect chronic stable angina. Trops nml, no acute EKG changes


2.  Chronic systolic CHF; clinically compensated 


3.  ICM; s/p AICD ([employer]tr[employer])


4.  CAD; recent [facility] 10/2020, patent stents no intervenable lesions  to 

nondominat RCA and OM


5.  Hx of VT: not on antiarrhythmic therapy. tele noted with PVC's, but no 

significant arrhythmias


6.  HTN: controlled


7.  HLP: not on goal


8.  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 


9.  Tobaccoism with likely COPD


10. suspect noncompliance





Recommendations


1. His cardiac procedures are recent as noted above. He needs antianginals which

he repeatedly refused but does take PRN NTG SL. I asked him if we could start 

him at least on Imdur but again does not want any. Follow up with primary 

cardiologist as an outpt. 


2. Secondary prevention measures. ASA/plavix


3. Will check device and note any contributing arrhythmia otherwise if no 

changes then may DC and encouraged to follow up with his cardiologist. 


4. Smoking cessation





YARED DOMINGUEZ MD 6/16/21 7515:


CARDIAC CONSULT


ASSESSMENT/PLAN


ASSESSMENT/PLAN


Patient seen and examined


I agree with our nurse practitioners assessment and plan.


Chest pain: Trops nml, no acute EKG changes.  Continue medical treatment.


Chronic systolic CHF; clinically compensated 


ICM; s/p AICD (Medtronic).  Will interrogate.


CAD; recent [facility] 10/2020, patent stents no intervenable lesions  to nondominat

RCA and OM


Hx of VT: not on antiarrhythmic therapy. tele noted with PVC's, but no 

significant arrhythmias.  ICD as above.


HTN: controlled


HLP: Medications as above. 


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 


Tobaccoism with COPD











ECHO PACHECO          Jun 16, 2021 10:37


YARED DOMINGUEZ MD            Jun 16, 2021 16:05

## 2021-06-18 NOTE — PDOC3
[facility]-Discharge Summary


Date of Admission:


Date of Admission:  Jun 16, 2021





Date of Discharge:


Date of Discharge:  Jun 16, 2021





Discharge Diagnosis:


Discharge Diagnosis:


1.  Chest pain: suspect chronic stable angina. Trops nml, no acute EKG changes


2.  Chronic systolic CHF; clinically compensated 


3.  ICM; s/p AICD ([employer]tronic)


4.  CAD; recent [facility] 10/2020, patent stents no intervenable lesions  to 

nondominat RCA and OM


5.  Hx of VT: not on antiarrhythmic therapy. tele noted with PVC's, but no 

significant arrhythmias


6.  HTN: controlled


7.  HLP: not on goal


8.  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 


9.  Tobaccoism with likely COPD


10. suspect noncompliance





Consults:


Consults:


cardiology


Recommendations


1. His cardiac procedures are recent as noted above. He needs antianginals which

he repeatedly refused but does take PRN NTG SL. I asked him if we could start 

him at least on Imdur but again does not want any. Follow up with primary 

cardiologist as an outpt. 


2. Secondary prevention measures. ASA/plavix


3. Will check device and note any contributing arrhythmia otherwise if no 

changes then may DC and encouraged to follow up with his cardiologist. 


4. Smoking cessation





Hospital Course:


Hospital Course:


61-year-old male presenting via EMS for chest pain.  Onset was approximately 45 

minutes prior to arrival while patient was walking around [employer].  

Reports substernal pain with intermittent radiation to bilateral shoulders and 

left jaw.  Patient took x3 sublingual nitros with temporary relief.  Nothing 

known makes worse.  Timing of symptoms has been constant since onset and 

worsened in intensity versus prior episodes of chest pain.  States he has 

significant cardiac history, has pacemaker/defibrillator present without any 

feelings of it firing.  Has history of numerous MIs, reports having x15 stents. 

He is on 81 mg of aspirin daily, did not take any today.  Patient was evaluated 

and hemodynamically stable by EMS, he was given 325 mg aspirin and transported 

to our facility for evaluation.  On arrival, patient reports ongoing 8/10 

severity chest pain.  Admits tobacco use smoking half a pack of cigarettes 

daily, no alcohol use, has distant history of illicit drug abuse but nothing 

recently per patient.





By day of discharge, pt was clinically stable, chest pain free and ready for 

discharge.  Rest of hospital course was uneventful





Disposition:


Disposition/Orders:  D/C to Home





Activity:


Activity:


Resume previous activity





Diet:


Diet:  Cardiac





Medications:


Home Meds


Active Scripts


Aspirin (ASPIRIN) 81 Mg Tab.chew, 1 TAB PO DAILY, #10 TAB


   Prov:ELROY NORIEGA DO         9/2/20


Reported Medications


Rosuvastatin Calcium (CRESTOR) 40 Mg Tablet, 40 MG PO HS for FOR CHOLESTEROL, 

#30 TAB 0 Refills


   8/7/20


Metoprolol Succinate (METOPROLOL SUCCINATE ( XL )) 25 Mg Tab.er.24h, 12.5 MG PO 

DAILY for FOR HYPERTENSION, #30 TAB 0 Refills


   1/22/20


Tiotropium Bromide (SPIRIVA) 18 Mcg Cap.w.dev, 1 CAP IH DAILY, #30 CAP 3 Refills


   6/14/16


Nitroglycerin (NITROGLYCERIN SubLingual) 0.4 Mg Tab.subl, 0.4 MG SL PRN Q5MIN 

PRN for CHEST PAIN, BOTTLE


   6/14/16


Pregabalin (LYRICA) 300 Mg Capsule, 1 CAP PO BID, #60 CAP 2 Refills


   6/14/16


Clopidogrel Bisulfate (PLAVIX) 75 Mg Tablet, 75 MG PO DAILY for TO PREVENT BLOOD

CLOTS, #30 TAB 0 Refills


   4/17/15


Scheduled


Aspirin (Aspirin), 1 TAB PO DAILY


Clopidogrel Bisulfate (Plavix), 75 MG PO DAILY, (Reported)


Metoprolol Succinate (Metoprolol Succinate ( Xl )), 12.5 MG PO DAILY, (Reported)


Pregabalin (Lyrica), 1 CAP PO BID, (Reported)


Rosuvastatin Calcium (Crestor), 40 MG PO HS, (Reported)


Tiotropium Bromide (Spiriva), 1 CAP IH DAILY, (Reported)





Scheduled PRN


Nitroglycerin (NITROGLYCERIN SubLingual), 0.4 MG SL PRN Q5MIN PRN for CHEST 

PAIN, (Reported)





Total Time:


Total Time:


Total time spent was 35  minutes in preparing scripts, discharge planning with 

SW and RN, and preparing this discharge summary. 


Patient seen and examined on day of discharge.





Justicifation of Admission Dx:


Justifications for Admission:


Justification of Admission Dx:  N/A


Angina:  Symp at Rest











JITENDRA GOLDEN MD                  Jun 18, 2021 16:43

## 2021-07-02 ENCOUNTER — HOSPITAL ENCOUNTER (INPATIENT)
Dept: HOSPITAL 61 - ER | Age: 61
LOS: 4 days | Discharge: HOME | DRG: 918 | End: 2021-07-06
Attending: INTERNAL MEDICINE | Admitting: INTERNAL MEDICINE
Payer: MEDICAID

## 2021-07-02 VITALS — WEIGHT: 158.51 LBS | HEIGHT: 67 IN | BODY MASS INDEX: 24.88 KG/M2

## 2021-07-02 VITALS — DIASTOLIC BLOOD PRESSURE: 60 MMHG | SYSTOLIC BLOOD PRESSURE: 118 MMHG

## 2021-07-02 DIAGNOSIS — I25.2: ICD-10-CM

## 2021-07-02 DIAGNOSIS — E78.5: ICD-10-CM

## 2021-07-02 DIAGNOSIS — Z82.49: ICD-10-CM

## 2021-07-02 DIAGNOSIS — E78.00: ICD-10-CM

## 2021-07-02 DIAGNOSIS — F19.10: ICD-10-CM

## 2021-07-02 DIAGNOSIS — F17.210: ICD-10-CM

## 2021-07-02 DIAGNOSIS — Z95.5: ICD-10-CM

## 2021-07-02 DIAGNOSIS — J44.9: ICD-10-CM

## 2021-07-02 DIAGNOSIS — T43.621A: Primary | ICD-10-CM

## 2021-07-02 DIAGNOSIS — K21.9: ICD-10-CM

## 2021-07-02 DIAGNOSIS — I11.0: ICD-10-CM

## 2021-07-02 DIAGNOSIS — F11.13: ICD-10-CM

## 2021-07-02 DIAGNOSIS — D64.9: ICD-10-CM

## 2021-07-02 DIAGNOSIS — Z79.82: ICD-10-CM

## 2021-07-02 DIAGNOSIS — Z95.810: ICD-10-CM

## 2021-07-02 DIAGNOSIS — Z79.02: ICD-10-CM

## 2021-07-02 DIAGNOSIS — F15.10: ICD-10-CM

## 2021-07-02 DIAGNOSIS — F41.9: ICD-10-CM

## 2021-07-02 DIAGNOSIS — Z86.73: ICD-10-CM

## 2021-07-02 DIAGNOSIS — Z79.899: ICD-10-CM

## 2021-07-02 DIAGNOSIS — M19.90: ICD-10-CM

## 2021-07-02 DIAGNOSIS — I25.119: ICD-10-CM

## 2021-07-02 DIAGNOSIS — Z91.19: ICD-10-CM

## 2021-07-02 DIAGNOSIS — Z88.8: ICD-10-CM

## 2021-07-02 DIAGNOSIS — I50.22: ICD-10-CM

## 2021-07-02 DIAGNOSIS — R07.89: ICD-10-CM

## 2021-07-02 LAB
ALBUMIN SERPL-MCNC: 3.4 G/DL (ref 3.4–5)
ALBUMIN/GLOB SERPL: 1 {RATIO} (ref 1–1.7)
ALP SERPL-CCNC: 110 U/L (ref 46–116)
ALT SERPL-CCNC: 18 U/L (ref 16–63)
ANION GAP SERPL CALC-SCNC: 11 MMOL/L (ref 6–14)
AST SERPL-CCNC: 15 U/L (ref 15–37)
BASOPHILS # BLD AUTO: 0 X10^3/UL (ref 0–0.2)
BASOPHILS NFR BLD: 1 % (ref 0–3)
BILIRUB SERPL-MCNC: 1.1 MG/DL (ref 0.2–1)
BUN SERPL-MCNC: 10 MG/DL (ref 8–26)
BUN/CREAT SERPL: 10 (ref 6–20)
CALCIUM SERPL-MCNC: 8.8 MG/DL (ref 8.5–10.1)
CHLORIDE SERPL-SCNC: 104 MMOL/L (ref 98–107)
CK SERPL-CCNC: 227 U/L (ref 39–308)
CO2 SERPL-SCNC: 25 MMOL/L (ref 21–32)
CREAT SERPL-MCNC: 1 MG/DL (ref 0.7–1.3)
EOSINOPHIL NFR BLD: 0.3 X10^3/UL (ref 0–0.7)
EOSINOPHIL NFR BLD: 6 % (ref 0–3)
ERYTHROCYTE [DISTWIDTH] IN BLOOD BY AUTOMATED COUNT: 16 % (ref 11.5–14.5)
GFR SERPLBLD BASED ON 1.73 SQ M-ARVRAT: 76 ML/MIN
GLUCOSE SERPL-MCNC: 84 MG/DL (ref 70–99)
HCT VFR BLD CALC: 29.8 % (ref 39–53)
HGB BLD-MCNC: 10 G/DL (ref 13–17.5)
LYMPHOCYTES # BLD: 0.7 X10^3/UL (ref 1–4.8)
LYMPHOCYTES NFR BLD AUTO: 15 % (ref 24–48)
MAGNESIUM SERPL-MCNC: 2 MG/DL (ref 1.8–2.4)
MCH RBC QN AUTO: 30 PG (ref 25–35)
MCHC RBC AUTO-ENTMCNC: 34 G/DL (ref 31–37)
MCV RBC AUTO: 89 FL (ref 79–100)
MONO #: 0.7 X10^3/UL (ref 0–1.1)
MONOCYTES NFR BLD: 15 % (ref 0–9)
NEUT #: 2.8 X10^3/UL (ref 1.8–7.7)
NEUTROPHILS NFR BLD AUTO: 63 % (ref 31–73)
PLATELET # BLD AUTO: 215 X10^3/UL (ref 140–400)
POTASSIUM SERPL-SCNC: 3.6 MMOL/L (ref 3.5–5.1)
PROT SERPL-MCNC: 6.9 G/DL (ref 6.4–8.2)
RBC # BLD AUTO: 3.35 X10^6/UL (ref 4.3–5.7)
SODIUM SERPL-SCNC: 140 MMOL/L (ref 136–145)
WBC # BLD AUTO: 4.5 X10^3/UL (ref 4–11)

## 2021-07-02 PROCEDURE — 93005 ELECTROCARDIOGRAM TRACING: CPT

## 2021-07-02 PROCEDURE — 85379 FIBRIN DEGRADATION QUANT: CPT

## 2021-07-02 PROCEDURE — G0378 HOSPITAL OBSERVATION PER HR: HCPCS

## 2021-07-02 PROCEDURE — 71275 CT ANGIOGRAPHY CHEST: CPT

## 2021-07-02 PROCEDURE — 71046 X-RAY EXAM CHEST 2 VIEWS: CPT

## 2021-07-02 PROCEDURE — 85025 COMPLETE CBC W/AUTO DIFF WBC: CPT

## 2021-07-02 PROCEDURE — 82550 ASSAY OF CK (CPK): CPT

## 2021-07-02 PROCEDURE — 96360 HYDRATION IV INFUSION INIT: CPT

## 2021-07-02 PROCEDURE — 83735 ASSAY OF MAGNESIUM: CPT

## 2021-07-02 PROCEDURE — G0379 DIRECT REFER HOSPITAL OBSERV: HCPCS

## 2021-07-02 PROCEDURE — 36415 COLL VENOUS BLD VENIPUNCTURE: CPT

## 2021-07-02 PROCEDURE — 83880 ASSAY OF NATRIURETIC PEPTIDE: CPT

## 2021-07-02 PROCEDURE — 84484 ASSAY OF TROPONIN QUANT: CPT

## 2021-07-02 PROCEDURE — 80053 COMPREHEN METABOLIC PANEL: CPT

## 2021-07-02 PROCEDURE — 83605 ASSAY OF LACTIC ACID: CPT

## 2021-07-02 NOTE — RAD
Exam: CT of chest with contrast



INDICATION: Chest pain



TECHNIQUE: Sequential axial images through the chest obtained following the administration of 90 mL o
f Isovue-370 IV contrast. Sagittal and coronal reformatted images were reconstructed from the axial d
sary and reviewed. 3-D reformatted images were reconstructed from the axial data and reviewed.



Exposure: One or more of the following in the visualized dose reduction techniques were utilized for 
this examination:

1.  Automated exposure control

2.  Adjustment of the MA and/or KV according to patient size

3.  Use of iterative of reconstructive technique



Comparisons: Chest x-ray same day



FINDINGS:

Visualized portions of the thyroid are unremarkable. No enlarged mediastinal lymph nodes are identifi
ed.



Heart size is at the upper limits of normal. Pacer with leads terminating the right heart. No pericar
dial effusion. Thoracic aorta has normal course and caliber. Pulmonary artery is not enlarged.



Airways are patent. No consolidation or pneumothorax. No suspicious lung nodules.



No pleural thickening.



Visualized upper abdomen is unremarkable.



 No suspicious osseous lesions or acute fractures.



IMPRESSION:

No pulmonary embolus identified within the main, lobar or segmental pulmonary arteries.





Electronically signed by: Sinan Trevino MD (7/2/2021 6:39 PM) [facility]

## 2021-07-02 NOTE — RAD
XR CHEST 2V



History: Reason: chest pain / Spl. Instructions:  / History: 



Comparison: Gladis 15, 2021



Findings:

No consolidation or pleural effusion. Unchanged heart size. No pneumothorax. Stable left-sided pacema
ker/ICD.



Impression: 

1.  No acute cardiopulmonary process.



Electronically signed by: Nolan Larson DO (7/2/2021 4:20 PM) [facility]

## 2021-07-02 NOTE — ED.ADGEN
Past Medical History


Past Medical History:  Asthma, CAD, CHF, CVA, High Cholesterol, Hypertension, 

MI, Other


Additional Past Medical Histor:  VTACH,IV Drug abuse-Methamphetamine


Past Surgical History:  Angioplasty, Pacemaker, Other


Additional Past Surgical Histo:  15 cardiac stents, multiple cardiac cat

heterizations,WITH DEFIB


Smoking Status:  Current Every Day Smoker


Alcohol Use:  Heavy


Drug Use:  Methamphetamine





General Adult


EDM:


Chief Complaint:  CHEST PAIN





HPI:


HPI:





Patient is a 61  year old male coming in for left-sided pounding chest pain that

radiates to his neck and arm.  Patient has been here numerous occasions in the 

past for chest pain after using methamphetamines.  Patient states he injected 

methamphetamine about 1 hour prior to arrival, chest pains are about 15 minutes 

after that.  Patient states he is on a binge that has been going for the past 2 

days.  States he had very little p.o. intake.





Review of Systems:


Review of Systems:


All other systems within normal limits except for as noted in the HPI





Current Medications:





Current Medications








 Medications


  (Trade)  Dose


 Ordered  Sig/Gwen  Start Time


 Stop Time Status Last Admin


Dose Admin


 


 Aspirin


  (Aspirin


 Chewable)  324 mg  1X  ONCE  21 16:00


 21 16:01 DC 21 16:16


324 MG


 


 Hydroxyzine HCl


  (Atarax)  25 mg  1X  PRN  21 16:45


    21 17:14


25 MG


 


 Info


  (CONTRAST GIVEN


 -- Rx MONITORING)  1 each  PRN DAILY  PRN  21 18:00


 21 17:59   





 


 Iohexol


  (Omnipaque 350


 Mg/ml)  90 ml  1X  ONCE  21 18:00


 21 18:01 DC 21 18:14


90 ML


 


 Sodium Chloride  1,000 ml @ 


 1,000 mls/hr  1X  ONCE  21 16:00


 21 16:59 DC 21 16:16


1,000 MLS/HR











Allergies:


Allergies:





Allergies








Coded Allergies Type Severity Reaction Last Updated Verified


 


  acetaminophen Allergy Intermediate  10/8/20 Yes


 


  albuterol Adverse Reaction Intermediate  20 Yes


 


  ibuprofen Adverse Reaction Intermediate Nausea and Vomiting 20 Yes











Physical Exam:


PE:


Constitutional: Well developed, well nourished, no acute distress, non-toxic 

appearance. []


HENT: Normocephalic, atraumatic, bilateral external ears normal,  nose normal. 

[]


Eyes: PERRLA, conjunctiva normal, no discharge. [] 


Neck: No rigidity, supple, no stridor. [] 


Cardiovascular: Tachycardic, regular rhythm, brisk cap refill []


Lungs & Thorax: Non labored symmetric respirations, no tachypnea or respiratory 

distress []


Abdomen: Soft, nondistended.


Skin: Warm, dry, no erythema, no rash. [] 


Back: Unremarkable


Extremities: No deformities, range of motion grossly intact, no lower extremity 

edema [] 


Neurologic: Alert and oriented X 3, no focal deficits noted. []


Psychologic: Affect normal, judgement normal, mood normal. []





Current Patient Data:


Labs:





                                Laboratory Tests








Test


 21


16:21


 


White Blood Count


 4.5 x10^3/uL


(4.0-11.0)


 


Red Blood Count


 3.35 x10^6/uL


(4.30-5.70)  L


 


Hemoglobin


 10.0 g/dL


(13.0-17.5)  L


 


Hematocrit


 29.8 %


(39.0-53.0)  L


 


Mean Corpuscular Volume


 89 fL ()





 


Mean Corpuscular Hemoglobin 30 pg (25-35)  


 


Mean Corpuscular Hemoglobin


Concent 34 g/dL


(31-37)


 


Red Cell Distribution Width


 16.0 %


(11.5-14.5)  H


 


Platelet Count


 215 x10^3/uL


(140-400)


 


Neutrophils (%) (Auto) 63 % (31-73)  


 


Lymphocytes (%) (Auto) 15 % (24-48)  L


 


Monocytes (%) (Auto) 15 % (0-9)  H


 


Eosinophils (%) (Auto) 6 % (0-3)  H


 


Basophils (%) (Auto) 1 % (0-3)  


 


Neutrophils # (Auto)


 2.8 x10^3/uL


(1.8-7.7)


 


Lymphocytes # (Auto)


 0.7 x10^3/uL


(1.0-4.8)  L


 


Monocytes # (Auto)


 0.7 x10^3/uL


(0.0-1.1)


 


Eosinophils # (Auto)


 0.3 x10^3/uL


(0.0-0.7)


 


Basophils # (Auto)


 0.0 x10^3/uL


(0.0-0.2)


 


D-Dimer (Camryn)


 0.98 ug/mlFEU


(0.00-0.50)  H


 


Sodium Level


 140 mmol/L


(136-145)


 


Potassium Level


 3.6 mmol/L


(3.5-5.1)


 


Chloride Level


 104 mmol/L


()


 


Carbon Dioxide Level


 25 mmol/L


(21-32)


 


Anion Gap 11 (6-14)  


 


Blood Urea Nitrogen


 10 mg/dL


(8-26)


 


Creatinine


 1.0 mg/dL


(0.7-1.3)


 


Estimated GFR


(Cockcroft-Gault) 76.0  





 


BUN/Creatinine Ratio 10 (6-20)  


 


Glucose Level


 84 mg/dL


(70-99)


 


Lactic Acid Level


 2.5 mmol/L


(0.4-2.0)  H


 


Calcium Level


 8.8 mg/dL


(8.5-10.1)


 


Magnesium Level


 2.0 mg/dL


(1.8-2.4)


 


Total Bilirubin


 1.1 mg/dL


(0.2-1.0)  H


 


Aspartate Amino Transferase


(AST) 15 U/L (15-37)





 


Alanine Aminotransferase (ALT)


 18 U/L (16-63)





 


Alkaline Phosphatase


 110 U/L


()


 


Creatine Kinase


 227 U/L


()


 


Troponin I Quantitative


 < 0.017 ng/mL


(0.000-0.055)


 


NT-Pro-B-Type Natriuretic


Peptide 892 pg/mL


(0-124)  H


 


Total Protein


 6.9 g/dL


(6.4-8.2)


 


Albumin


 3.4 g/dL


(3.4-5.0)


 


Albumin/Globulin Ratio 1.0 (1.0-1.7)  





                                Laboratory Tests


21 16:21








                                Laboratory Tests


21 16:21








Vital Signs:





                                   Vital Signs








  Date Time  Temp Pulse Resp B/P (MAP) Pulse Ox O2 Delivery O2 Flow Rate FiO2


 


21 19:06  96  100/56 (71) 96 Room Air  


 


21 17:13   18     


 


21 15:44 98.1       





 98.1       











EKG:


EKG:


Sinus rhythm, heart rate 100 bpm, nonspecific intraventricular block, no STEMI 

[]





Heart Score:


C/O Chest Pain:  Yes


HEART Score for Chest Pain:  








HEART Score for Chest Pain Response (Comments) Value


 


History Moderately Suspicious 1


 


ECG Nonspecific Repolarizatio 1


 


Age >45 - < 65 1


 


Risk Factors >3 Risk Factors or Hx CAD 2


 


Troponin < Normal Limit 0


 


Total  5








Risk Factors:


Risk Factors:  DM, Current or recent (<one month) smoker, HTN, HLP, family 

history of CAD, obesity.


Risk Scores:


Score 0 - 3:  2.5% MACE over next 6 weeks - Discharge Home


Score 4 - 6:  20.3% MACE over next 6 weeks - Admit for Clinical Observation


Score 7 - 10:  72.7% MACE over next 6 weeks - Early Invasive Strategies





Radiology/Procedures:


Radiology/Procedures:


[facility]


                    [address] [place] [address]


                                 [phone]


                                        


                                 IMAGING REPORT





                                     Signed





PATIENT: JOB RIOS   ACCOUNT: [id number]     MRN#: [id number]


: 1960           LOCATION: ER              AGE: 61


SEX: M                    EXAM DT: 21         ACCESSION#: [id number]


STATUS: PRE ER            ORD. PHYSICIAN: SAEN SERVIN MD


REASON: chest pain


PROCEDURE: CHEST PA & LATERAL





XR CHEST 2V





History: Reason: chest pain / Spl. Instructions:  / History: 





Comparison: Gladis 15, 2021





Findings:


No consolidation or pleural effusion. Unchanged heart size. No pneumothorax. 

Stable left-sided pacemaker/ICD.





Impression: 


1.  No acute cardiopulmonary process.





Electronically signed by: Nolan Larson DO (2021 4:20 PM) [facility]














DICTATED and SIGNED BY:     NOLAN LARSON DO


DATE:     21 3021VSE3 0


[]





Course & Med Decision Making:


Course & Med Decision Making


Care transitioned at shift change, pending CTA and repeat troponin.





Dragon Disclaimer:


Dragon Disclaimer:


This electronic medical record was generated, in whole or in part, using a voice

 recognition dictation system.





Departure


Departure


Impression:  


   Primary Impression:  


   Chest pain


   Additional Impression:  


   Substance abuse


Disposition:   ADMITTED AS INPATIENT


Admitting Physician:  WILFRED (Dr. Mendoza)


Condition:  STABLE


Referrals:  


NO PCP (PCP)





Problem Qualifiers











SEAN SERVIN MD             2021 15:51


KALEY GRACE DO                 2021 22:05

## 2021-07-02 NOTE — EKG
[facility]

              [address] [place] [address]

Test Date:    2021               Test Time:    15:40:27

Pat Name:     JOB RIOS           Department:   

Patient ID:   [id number]           Room:          

Gender:       M                        Technician:   

:          1960               Requested By: SEAN SERVIN

Order Number: [id number]           Reading MD:   Cal Lara MD

                                 Measurements

Intervals                              Axis          

Rate:         100                      P:            55

AZ:           110                      QRS:          23

QRSD:         164                      T:            -153

QT:           392                                    

QTc:          509                                    

                           Interpretive Statements

SINUS RHYTHM

V-PACING

Electronically Signed On 7-3-2021 18:58:20 CDT by Cal Lara MD

## 2021-07-03 VITALS — DIASTOLIC BLOOD PRESSURE: 72 MMHG | SYSTOLIC BLOOD PRESSURE: 127 MMHG

## 2021-07-03 VITALS — DIASTOLIC BLOOD PRESSURE: 54 MMHG | SYSTOLIC BLOOD PRESSURE: 90 MMHG

## 2021-07-03 VITALS — SYSTOLIC BLOOD PRESSURE: 103 MMHG | DIASTOLIC BLOOD PRESSURE: 57 MMHG

## 2021-07-03 VITALS — DIASTOLIC BLOOD PRESSURE: 52 MMHG | SYSTOLIC BLOOD PRESSURE: 99 MMHG

## 2021-07-03 VITALS — DIASTOLIC BLOOD PRESSURE: 50 MMHG | SYSTOLIC BLOOD PRESSURE: 82 MMHG

## 2021-07-03 VITALS — DIASTOLIC BLOOD PRESSURE: 58 MMHG | SYSTOLIC BLOOD PRESSURE: 97 MMHG

## 2021-07-03 RX ADMIN — ASPIRIN 81 MG SCH MG: 81 TABLET ORAL at 09:00

## 2021-07-03 RX ADMIN — BACITRACIN SCH MLS/HR: 5000 INJECTION, POWDER, FOR SOLUTION INTRAMUSCULAR at 09:45

## 2021-07-03 RX ADMIN — CLOPIDOGREL BISULFATE SCH MG: 75 TABLET ORAL at 12:21

## 2021-07-03 RX ADMIN — ATORVASTATIN CALCIUM SCH MG: 40 TABLET, FILM COATED ORAL at 20:56

## 2021-07-03 RX ADMIN — PREGABALIN SCH MG: 75 CAPSULE ORAL at 09:00

## 2021-07-03 RX ADMIN — METOPROLOL SUCCINATE SCH MG: 25 TABLET, EXTENDED RELEASE ORAL at 09:00

## 2021-07-03 RX ADMIN — PREGABALIN SCH MG: 75 CAPSULE ORAL at 12:22

## 2021-07-03 RX ADMIN — BACITRACIN SCH MLS/HR: 5000 INJECTION, POWDER, FOR SOLUTION INTRAMUSCULAR at 23:05

## 2021-07-03 RX ADMIN — PREGABALIN SCH MG: 75 CAPSULE ORAL at 20:56

## 2021-07-03 RX ADMIN — CLOPIDOGREL BISULFATE SCH MG: 75 TABLET ORAL at 09:00

## 2021-07-03 RX ADMIN — ENOXAPARIN SODIUM SCH MG: 40 INJECTION SUBCUTANEOUS at 09:45

## 2021-07-03 RX ADMIN — ASPIRIN 81 MG SCH MG: 81 TABLET ORAL at 12:21

## 2021-07-03 NOTE — PDOC
Provider Note


Date of Service:


DATE: 7/3/21 


TIME: 18:58


Provider Note


Please see prior consult notes. 


Patient presenting with chest pain after meth abuse. 


EKG unchanged. Tele unremarkable. Trop negative. CTPE negative. 


Nothing further to add from a CV standpoint. 


Please call with any further questions.





Justifications for Admission


Other Justification


ACS











RAYMUNDO SCHAEFFER MD        Jul 3, 2021 19:00

## 2021-07-03 NOTE — NUR
Patient arrived and refused to answer admission questions other then nothing has changed 
since the last time he was here and not taking his medications. He stated he did meth cause 
he did not care and we are not going to do anything. Medications restarted, patient received 
some snacks and sleeping.

## 2021-07-04 VITALS — SYSTOLIC BLOOD PRESSURE: 122 MMHG | DIASTOLIC BLOOD PRESSURE: 58 MMHG

## 2021-07-04 VITALS — SYSTOLIC BLOOD PRESSURE: 125 MMHG | DIASTOLIC BLOOD PRESSURE: 61 MMHG

## 2021-07-04 VITALS — DIASTOLIC BLOOD PRESSURE: 67 MMHG | SYSTOLIC BLOOD PRESSURE: 106 MMHG

## 2021-07-04 VITALS — DIASTOLIC BLOOD PRESSURE: 63 MMHG | SYSTOLIC BLOOD PRESSURE: 116 MMHG

## 2021-07-04 VITALS — SYSTOLIC BLOOD PRESSURE: 95 MMHG | DIASTOLIC BLOOD PRESSURE: 67 MMHG

## 2021-07-04 VITALS — DIASTOLIC BLOOD PRESSURE: 64 MMHG | SYSTOLIC BLOOD PRESSURE: 106 MMHG

## 2021-07-04 RX ADMIN — METOPROLOL SUCCINATE SCH MG: 25 TABLET, EXTENDED RELEASE ORAL at 08:59

## 2021-07-04 RX ADMIN — ATORVASTATIN CALCIUM SCH MG: 40 TABLET, FILM COATED ORAL at 21:01

## 2021-07-04 RX ADMIN — CLOPIDOGREL BISULFATE SCH MG: 75 TABLET ORAL at 08:54

## 2021-07-04 RX ADMIN — ASPIRIN 81 MG SCH MG: 81 TABLET ORAL at 08:55

## 2021-07-04 RX ADMIN — PREGABALIN SCH MG: 75 CAPSULE ORAL at 08:55

## 2021-07-04 RX ADMIN — PREGABALIN SCH MG: 75 CAPSULE ORAL at 21:02

## 2021-07-04 RX ADMIN — ENOXAPARIN SODIUM SCH MG: 40 INJECTION SUBCUTANEOUS at 09:45

## 2021-07-04 RX ADMIN — BACITRACIN SCH MLS/HR: 5000 INJECTION, POWDER, FOR SOLUTION INTRAMUSCULAR at 11:49

## 2021-07-04 NOTE — PDOC
PROGRESS NOTES


Date of Service:


DATE: 7/4/21 


TIME: 11:17





Chief Complaint


Chief Complaint





VTE Prophylaxis Ordered


VTE Prophylaxis Devices:  No


VTE Pharmacological Prophylaxi:  Yes





Assessment/Plan


Assessment/Plan


IMPRESSION ===============








Chest pain: //  angina. Trops nml, no acute EKG changes //  troponin neg x 2 


Chronic systolic CHF; clinically compensated 


ICM; s/p AICD ([employer])


CAD; recent [facility] 10/2020, patent stents no intervenable lesions  to nondominat

RCA and OM


Hx of VT: not on antiarrhythmic therapy. 


HTN: controlled


HLP: not on goal


H/o substance abuse: meth use. with relapse


Tobaccoism with likely COPD


MEDICAL noncompliance, severe


Normocytic anemia


Moderate withdrawal from meth, very anxious

















PLAN==============











ADMITTED AS INPATIENT


CVC BED


TREND TROPONIN 


Cardiology consult





 Justifications for Admission 


Justifications for Admission


Other Justification


ACS





History of Present Illness


History of Present Illness


Identification/Chief Complaint


Chief Complaint


chest pain after injecting meth yesterday





History of Present Illness


History of Present Illness


 61  year old male seen in ER  for left-sided pounding chest pain that radiates 

to his neck and arm./ here numerous occasions in the past for chest pain after 

using methamphetamines.  


states he injected methamphetamine about 1 hour prior to arrival, chest pains 

are about 15 minutes after that. " he is on a binge that has been going for the 

past 2 days"


he has significant cardiac history, has pacemaker/defibrillator present without 

any feelings of it firing.// history of numerous MIs, reports having x15 stents.

 He is on 81 mg of aspirin daily, Patient was evaluated and hemodynamically 

stable





Past Medical History


Past Medical History:  Asthma, CAD, CHF, CVA, High Cholesterol, Hypertension, 

MI, Other


Additional Past Medical Histor:  VTACH,IV Drug abuse-Methamphetamine


Past Surgical History:  Angioplasty, Pacemaker, Other


Additional Past Surgical Histo:  15 cardiac stents, multiple cardiac 

catheterizations,WITH DEFIB


Smoking Status:  Current Every Day Smoker


Alcohol Use:  Heavy


Drug Use:  Methamphetamine


Cardiovascular:  CAD, CHF, HTN, Hyperlipidemia, Other


Pulmonary:  Asthma


CENTRAL NERVOUS SYSTEM:  CVA


GI:  GERD


Heme/Onc:  No pertinent hx


Hepatobiliary:  No pertinent hx


Psych:  Anxiety


Musculoskeletal:  Osteoarthritis


Rheumatologic:  No pertinent hx


Infectious disease:  No pertinent hx


Renal/:  No pertinent hx


Endocrine:  No pertinent hx





Past Surgical History


Past Surgical History:  Pacemaker, Other





Family History


Family History:  Hypertension





Social History


Smoke:  <1 pack per day


ALCOHOL:  occassional


Drugs:  Marijuana, Crystal meth





Current Problem List


Problem List


Problems


Medical Problems:


(1) Chest pain


Status: Acute  





(2) Substance abuse


Status: Acute  











Current Medications


Current Medications





Current Medications


Aspirin (Aspirin Chewable) 324 mg 1X  ONCE PO  Last administered on 7/2/21at 

16:16;  Start 7/2/21 at 16:00;  Stop 7/2/21 at 16:01;  Status DC


Sodium Chloride 1,000 ml @  1,000 mls/hr 1X  ONCE IV  Last administered on 

7/2/21at 16:16;  Start 7/2/21 at 16:00;  Stop 7/2/21 at 16:59;  Status DC


Hydroxyzine HCl (Atarax) 25 mg 1X  PRN PO ITCHING Last administered on 7/2/21at 

17:14;  Start 7/2/21 at 16:45


Iohexol (Omnipaque 350 Mg/ml) 90 ml 1X  ONCE IV  Last administered on 7/2/21at 

18:14;  Start 7/2/21 at 18:00;  Stop 7/2/21 at 18:01;  Status DC


Info (CONTRAST GIVEN -- Rx MONITORING) 1 each PRN DAILY  PRN MC SEE COMMENTS;  

Start 7/2/21 at 18:00;  Stop 7/4/21 at 17:59


Aspirin (Aspirin Chewable) 81 mg DAILY PO ;  Start 7/3/21 at 09:00


Clopidogrel Bisulfate (Plavix) 75 mg DAILY PO ;  Start 7/3/21 at 09:00


Metoprolol Succinate (Toprol Xl) 12.5 mg DAILY PO ;  Start 7/3/21 at 09:00


Nitroglycerin (Nitrostat) 0.4 mg PRN Q5MIN  PRN SL CHEST PAIN;  Start 7/2/21 at 

23:00


Pregabalin (Lyrica) 300 mg BID PO ;  Start 7/3/21 at 09:00


Atorvastatin Calcium (Lipitor) 80 mg QHS PO ;  Start 7/3/21 at 21:00





Active Scripts


Active


Aspirin 81 Mg Tab.chew 1 Tab PO DAILY


Reported


Crestor (Rosuvastatin Calcium) 40 Mg Tablet 40 Mg PO HS


Metoprolol Succinate ( Xl ) (Metoprolol Succinate) 25 Mg Tab.er.24h 12.5 Mg PO 

DAILY


Spiriva (Tiotropium [place]) 18 Mcg Cap.w.dev 1 Cap IH DAILY


NITROGLYCERIN SubLingual (Nitroglycerin) 0.4 Mg Tab.subl 0.4 Mg SL PRN Q5MIN PRN


Lyrica (Pregabalin) 300 Mg Capsule 1 Cap PO BID


Plavix (Clopidogrel Bisulfate) 75 Mg Tablet 75 Mg PO DAILY





Allergies


Allergies:  


Coded Allergies:  


     acetaminophen (Verified  Allergy, Intermediate, 10/8/20)


     albuterol (Verified  Adverse Reaction, Intermediate, 1/22/20)


   Patient states "it speeds my heart up too much"


     ibuprofen (Verified  Adverse Reaction, Intermediate, Nausea and Vomiting, 

1/22/20)





ROS


General:  YES: Fatigue, Malaise, Appetite; 


   No: Chills, Night Sweats, Other


PSYCHOLOGICAL ROS:  YES: Anxiety, Depression; 


   No: Behavioral Disorder, Concentration difficultie, Decreased libido, 

Disorientation, Hallucinations, Hostility, Irritablity, Memory difficulties, 

Mood Swings, Obsessive thoughts, Physical abuse, Sexual abuse, Sleep 

disturbances, Suicidal ideation, Other


Eyes:  No Blurry vision, No Decreased vision, No Double vision, No Dry eyes, No 

Excessive tearing, No Eye Pain, No Itchy Eyes, No Loss of vision, No 

Photophobia, No Scotomata, No Uses contacts, No Uses glasses, No Other


HEENT:  No: Heacaches, Visual Changes, Hearing change, Nasal congestion, Nasal 

discharge, Oral lesions, Sinus pain, Sore Throat, Epistaxis, Sneezing, Snoring, 

Tinnitus, Vertigo, Vocal changes, Other


ALLERGY AND IMMUNOLOGY:  YES: Hives; 


   No: Insect Bite Sensitivity, Itchy/Watery Eyes, Nasal Congestion, Post Nasal 

Drip, Seasonal Allergies, Other


Hematological and Lymphatic:  No: Bleeding Problems, Blood Clots, Blood 

Transfusions, Brusing, Night Sweats, Pallor, Swollen Lymph Nodes, Other


ENDOCRINE:  No: Breast Changes, Galactorrhea, Hair Pattern Changes, Hot Flashes,

Malaise/lethargy, Mood Swings, Palpitations, Polydipsia/polyuria, Skin Changes, 

Temperature Intolerance, Unexpected Weight Changes, Other


Breast:  No New/Changing Breast Lumps, No Nipple changes, No Nipple discharge, 

No Other


Respiratory:  No: Cough, Hemoptysis, Orthopnea, Pleuritic Pain, Shortness of 

breath, SOB with excertion, Sputum Changes, Stridor, Tachypnea, Wheezing, Other


Cardiovascular:  yes Chest Pain, yes Palpitations; 


   No Orthopnea, No Paroxysmal Noc. Dyspnea, No Edema, No Lt Headedness, No 

Other


Gastrointestinal:  No Nausea, No Vomiting, No Abdominal Pain, No Diarrhea, No 

Constipation, No Melena, No Hematochezia, No Other


Genitourinary:  No Dysuria, No Frequency, No Incontinence, No Hematuria, No 

Retention, No Discharge, No Urgency, No Pain, No Flank Pain, No Other, No , No ,

No , No , No , No , No 


Musculoskeletal:  No Gait Disturbance, No Joint Pain, No Joint Stiffness, No 

Joint Swelling, No Muscle Pain, No Muscular Weakness, No Pain In:, No Swelling 

In:, No Other


Neurological:  No Behavorial Changes, No Bowel/Bladder ControlChng, No 

Confusion, No Dizziness, No Gait Disturbance, No Headaches, No Impaired 

Coord/balance, No Memory Loss, No Numbness/Tingling, No Seizures, No Speech 

Problems, No Tremors, No Visual Changes, No Weakness, No Other


Skin:  Yes Dry Skin; 


   No Eczema, No Hair Changes, No Lumps, No Mole Changes, No Mottling, No Nail 

Changes, No Pruritus, No Rash, No Skin Lesion Changes, No Other, No Acne











7-04





Chest pain: //  angina. Trops nml, no acute EKG changes //  troponin neg x 2 


Chronic systolic CHF; clinically compensated 


ICM; s/p AICD ([employer]tronic)


CAD; recent [facility] 10/2020, patent stents no intervenable lesions  to nondominat

RCA and OM


Hx of VT: not on antiarrhythmic therapy. 


HTN: controlled


HLP: not on goal


H/o substance abuse: meth use. with relapse


Tobaccoism with likely COPD


MEDICAL noncompliance, severe


d/w RN  ADD po ativan 1 mg q 8 hrs prn





Moderate withdrawal from meth, very anxious, has arraignment  to go to 

[facility] for treatment 7-05 via [facility]





Vitals


Vitals





Vital Signs








  Date Time  Temp Pulse Resp B/P (MAP) Pulse Ox O2 Delivery O2 Flow Rate FiO2


 


7/4/21 08:59  84  106/64    


 


7/4/21 07:40      Room Air  


 


7/4/21 07:00 97.3  18  96   





 97.3       











Physical Exam


General:  Alert, Oriented X3, Cooperative, No acute distress, mild distress


Heart:  Regular rate


Lungs:  Wheezing, Crackles


Abdomen:  Normal bowel sounds, Soft, No tenderness, No hepatosplenomegaly


Extremities:  No clubbing, No cyanosis, No edema





Assessment and Plan


Assessmemt and Plan


Problems


Medical Problems:


(1) Chest pain


Status: Acute  





(2) Substance abuse


Status: Acute  











Comment


Review of Relevant


I have reviewed the following items luke (where applicable) has been applied.


Labs





Laboratory Tests








Test


 7/2/21


16:21 7/2/21


19:46


 


White Blood Count


 4.5 x10^3/uL


(4.0-11.0) 





 


Red Blood Count


 3.35 x10^6/uL


(4.30-5.70) 





 


Hemoglobin


 10.0 g/dL


(13.0-17.5) 





 


Hematocrit


 29.8 %


(39.0-53.0) 





 


Mean Corpuscular Volume 89 fL ()  


 


Mean Corpuscular Hemoglobin 30 pg (25-35)  


 


Mean Corpuscular Hemoglobin


Concent 34 g/dL


(31-37) 





 


Red Cell Distribution Width


 16.0 %


(11.5-14.5) 





 


Platelet Count


 215 x10^3/uL


(140-400) 





 


Neutrophils (%) (Auto) 63 % (31-73)  


 


Lymphocytes (%) (Auto) 15 % (24-48)  


 


Monocytes (%) (Auto) 15 % (0-9)  


 


Eosinophils (%) (Auto) 6 % (0-3)  


 


Basophils (%) (Auto) 1 % (0-3)  


 


Neutrophils # (Auto)


 2.8 x10^3/uL


(1.8-7.7) 





 


Lymphocytes # (Auto)


 0.7 x10^3/uL


(1.0-4.8) 





 


Monocytes # (Auto)


 0.7 x10^3/uL


(0.0-1.1) 





 


Eosinophils # (Auto)


 0.3 x10^3/uL


(0.0-0.7) 





 


Basophils # (Auto)


 0.0 x10^3/uL


(0.0-0.2) 





 


D-Dimer (Camryn)


 0.98 ug/mlFEU


(0.00-0.50) 





 


Sodium Level


 140 mmol/L


(136-145) 





 


Potassium Level


 3.6 mmol/L


(3.5-5.1) 





 


Chloride Level


 104 mmol/L


() 





 


Carbon Dioxide Level


 25 mmol/L


(21-32) 





 


Anion Gap 11 (6-14)  


 


Blood Urea Nitrogen


 10 mg/dL


(8-26) 





 


Creatinine


 1.0 mg/dL


(0.7-1.3) 





 


Estimated GFR


(Cockcroft-Gault) 76.0 


 





 


BUN/Creatinine Ratio 10 (6-20)  


 


Glucose Level


 84 mg/dL


(70-99) 





 


Lactic Acid Level


 2.5 mmol/L


(0.4-2.0) 0.5 mmol/L


(0.4-2.0)


 


Calcium Level


 8.8 mg/dL


(8.5-10.1) 





 


Magnesium Level


 2.0 mg/dL


(1.8-2.4) 





 


Total Bilirubin


 1.1 mg/dL


(0.2-1.0) 





 


Aspartate Amino Transf


(AST/SGOT) 15 U/L (15-37) 


 





 


Alanine Aminotransferase


(ALT/SGPT) 18 U/L (16-63) 


 





 


Alkaline Phosphatase


 110 U/L


() 





 


Creatine Kinase


 227 U/L


() 





 


Troponin I Quantitative


 < 0.017 ng/mL


(0.000-0.055) < 0.017 ng/mL


(0.000-0.055)


 


NT-Pro-B-Type Natriuretic


Peptide 892 pg/mL


(0-124) 





 


Total Protein


 6.9 g/dL


(6.4-8.2) 





 


Albumin


 3.4 g/dL


(3.4-5.0) 





 


Albumin/Globulin Ratio 1.0 (1.0-1.7)  








Medications





Current Medications


Aspirin (Aspirin Chewable) 324 mg 1X  ONCE PO  Last administered on 7/2/21at 

16:16;  Start 7/2/21 at 16:00;  Stop 7/2/21 at 16:01;  Status DC


Sodium Chloride 1,000 ml @  1,000 mls/hr 1X  ONCE IV  Last administered on 

7/2/21at 16:16;  Start 7/2/21 at 16:00;  Stop 7/2/21 at 16:59;  Status DC


Hydroxyzine HCl (Atarax) 25 mg 1X  PRN PO ITCHING Last administered on 7/2/21at 

17:14;  Start 7/2/21 at 16:45


Iohexol (Omnipaque 350 Mg/ml) 90 ml 1X  ONCE IV  Last administered on 7/2/21at 

18:14;  Start 7/2/21 at 18:00;  Stop 7/2/21 at 18:01;  Status DC


Info (CONTRAST GIVEN -- Rx MONITORING) 1 each PRN DAILY  PRN MC SEE COMMENTS;  

Start 7/2/21 at 18:00;  Stop 7/4/21 at 17:59


Aspirin (Aspirin Chewable) 81 mg DAILY PO  Last administered on 7/4/21at 08:55; 

Start 7/3/21 at 09:00


Clopidogrel Bisulfate (Plavix) 75 mg DAILY PO  Last administered on 7/4/21at 

08:54;  Start 7/3/21 at 09:00


Metoprolol Succinate (Toprol Xl) 12.5 mg DAILY PO ;  Start 7/3/21 at 09:00


Nitroglycerin (Nitrostat) 0.4 mg PRN Q5MIN  PRN SL CHEST PAIN;  Start 7/2/21 at 

23:00


Pregabalin (Lyrica) 300 mg BID PO  Last administered on 7/4/21at 08:55;  Start 

7/3/21 at 09:00


Atorvastatin Calcium (Lipitor) 80 mg QHS PO  Last administered on 7/3/21at 

20:56;  Start 7/3/21 at 21:00


Ipratropium Bromide (Atrovent) 0.5 mg RTQID NEB ;  Start 7/3/21 at 12:00;  Stop 

7/3/21 at 15:45;  Status DC


Sodium Chloride (Normal Saline Flush) 3 ml QSHIFT  PRN IV AFTER MEDS AND BLOOD 

DRAWS;  Start 7/3/21 at 09:45


Sodium Chloride 1,000 ml @  75 mls/hr [id number] IV ;  Start 7/3/21 at 09:45


Ondansetron HCl (Zofran) 4 mg PRN Q4HRS  PRN IV NAUSEA/VOMITING;  Start 7/3/21 

at 09:45


Al Hydroxide/Mg Hydroxide (Mylanta Plus Xs) 30 ml PRN DAILY  PRN PO HEARTBURN / 

GAS;  Start 7/3/21 at 09:45


Sodium Monofluorophosphate (Fleet Adult) 133 ml PRN DAILY  PRN [place] CONSTIPATION; 

Start 7/3/21 at 09:45


Docusate Sodium (Colace) 100 mg PRN BID  PRN PO HARD STOOLS;  Start 7/3/21 at 

09:45


Guaifenesin (Robitussin) 200 mg PRN Q4HRS  PRN PO COUGH;  Start 7/3/21 at 09:45


Enoxaparin Sodium (Lovenox 40mg Syringe) 40 mg Q24H SQ ;  Start 7/3/21 at 09:45





Active Scripts


Active


Aspirin 81 Mg Tab.chew 1 Tab PO DAILY


Reported


Crestor (Rosuvastatin Calcium) 40 Mg Tablet 40 Mg PO HS


Metoprolol Succinate ( Xl ) (Metoprolol Succinate) 25 Mg Tab.er.24h 12.5 Mg PO 

DAILY


Spiriva (Tiotropium [place]) 18 Mcg Cap.w.dev 1 Cap IH DAILY


NITROGLYCERIN SubLingual (Nitroglycerin) 0.4 Mg Tab.subl 0.4 Mg SL PRN Q5MIN PRN


Lyrica (Pregabalin) 300 Mg Capsule 1 Cap PO BID


Plavix (Clopidogrel Bisulfate) 75 Mg Tablet 75 Mg PO DAILY


Vitals/I & O





Vital Sign - Last 24 Hours








 7/3/21 7/3/21 7/3/21 7/3/21





 11:28 14:52 19:58 20:05


 


Temp   98.0 





   98.0 


 


Pulse 91 99 85 


 


Resp 21 21 17 


 


B/P (MAP) 103/57 (72) 97/58 (71) 90/54 (66) 


 


Pulse Ox 98 99 96 


 


O2 Delivery Room Air Room Air Room Air Room Air


 


    





    





 7/3/21 7/4/21 7/4/21 7/4/21





 23:31 02:30 07:00 07:40


 


Temp 97.8 98.0 97.3 





 97.8 98.0 97.3 


 


Pulse 83 94 84 


 


Resp 17 18 18 


 


B/P (MAP) 99/52 (68) 95/67 (76) 106/64 (78) 


 


Pulse Ox 98 96 96 


 


O2 Delivery Room Air Room Air Room Air Room Air


 


    





    





 7/4/21   





 08:59   


 


Pulse 84   


 


B/P (MAP) 106/64   














Intake and Output   


 


 7/3/21 7/3/21 7/4/21





 15:00 23:00 07:00


 


Intake Total 240 ml 200 ml 420 ml


 


Balance 240 ml 200 ml 420 ml











Justicifation of Admission Dx:


Justifications for Admission:


Justification of Admission Dx:  N/A


Angina:  Symp at Rest











TRU TREVINO MD           Jul 4, 2021 11:17

## 2021-07-05 VITALS — DIASTOLIC BLOOD PRESSURE: 77 MMHG | SYSTOLIC BLOOD PRESSURE: 120 MMHG

## 2021-07-05 VITALS — SYSTOLIC BLOOD PRESSURE: 129 MMHG | DIASTOLIC BLOOD PRESSURE: 78 MMHG

## 2021-07-05 VITALS — SYSTOLIC BLOOD PRESSURE: 127 MMHG | DIASTOLIC BLOOD PRESSURE: 75 MMHG

## 2021-07-05 VITALS — DIASTOLIC BLOOD PRESSURE: 77 MMHG | SYSTOLIC BLOOD PRESSURE: 112 MMHG

## 2021-07-05 VITALS — SYSTOLIC BLOOD PRESSURE: 102 MMHG | DIASTOLIC BLOOD PRESSURE: 60 MMHG

## 2021-07-05 VITALS — DIASTOLIC BLOOD PRESSURE: 76 MMHG | SYSTOLIC BLOOD PRESSURE: 130 MMHG

## 2021-07-05 RX ADMIN — BACITRACIN SCH MLS/HR: 5000 INJECTION, POWDER, FOR SOLUTION INTRAMUSCULAR at 08:07

## 2021-07-05 RX ADMIN — CLOPIDOGREL BISULFATE SCH MG: 75 TABLET ORAL at 07:53

## 2021-07-05 RX ADMIN — ENOXAPARIN SODIUM SCH MG: 40 INJECTION SUBCUTANEOUS at 09:45

## 2021-07-05 RX ADMIN — MORPHINE SULFATE PRN MG: 2 INJECTION, SOLUTION INTRAMUSCULAR; INTRAVENOUS at 18:51

## 2021-07-05 RX ADMIN — PREGABALIN SCH MG: 75 CAPSULE ORAL at 20:49

## 2021-07-05 RX ADMIN — ASPIRIN 81 MG SCH MG: 81 TABLET ORAL at 07:53

## 2021-07-05 RX ADMIN — METOPROLOL SUCCINATE SCH MG: 25 TABLET, EXTENDED RELEASE ORAL at 09:00

## 2021-07-05 RX ADMIN — ATORVASTATIN CALCIUM SCH MG: 40 TABLET, FILM COATED ORAL at 20:48

## 2021-07-05 RX ADMIN — PREGABALIN SCH MG: 75 CAPSULE ORAL at 07:53

## 2021-07-05 RX ADMIN — MORPHINE SULFATE PRN MG: 2 INJECTION, SOLUTION INTRAMUSCULAR; INTRAVENOUS at 21:56
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
## 2021-07-06 VITALS — DIASTOLIC BLOOD PRESSURE: 50 MMHG | SYSTOLIC BLOOD PRESSURE: 80 MMHG

## 2021-07-06 VITALS — DIASTOLIC BLOOD PRESSURE: 54 MMHG | SYSTOLIC BLOOD PRESSURE: 91 MMHG

## 2021-07-06 VITALS — SYSTOLIC BLOOD PRESSURE: 123 MMHG | DIASTOLIC BLOOD PRESSURE: 63 MMHG

## 2021-07-06 RX ADMIN — MORPHINE SULFATE PRN MG: 2 INJECTION, SOLUTION INTRAMUSCULAR; INTRAVENOUS at 01:04

## 2021-07-06 RX ADMIN — MORPHINE SULFATE PRN MG: 2 INJECTION, SOLUTION INTRAMUSCULAR; INTRAVENOUS at 12:05

## 2021-07-06 RX ADMIN — MORPHINE SULFATE PRN MG: 2 INJECTION, SOLUTION INTRAMUSCULAR; INTRAVENOUS at 07:58

## 2021-07-06 RX ADMIN — CLOPIDOGREL BISULFATE SCH MG: 75 TABLET ORAL at 07:57

## 2021-07-06 RX ADMIN — METOPROLOL SUCCINATE SCH MG: 25 TABLET, EXTENDED RELEASE ORAL at 07:58

## 2021-07-06 RX ADMIN — PREGABALIN SCH MG: 75 CAPSULE ORAL at 07:57

## 2021-07-06 RX ADMIN — ASPIRIN 81 MG SCH MG: 81 TABLET ORAL at 07:57

## 2021-07-06 RX ADMIN — ENOXAPARIN SODIUM SCH MG: 40 INJECTION SUBCUTANEOUS at 07:58
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
## 2021-07-06 NOTE — PDOC3
Discharge Summary


Visit Information


Date of Admission:  Jul 3, 2021


Date of Discharge:  Jul 6, 2021


Final Diagnosis


Problems


Medical Problems:


(1) Chest pain


Status: Acute  





(2) Substance abuse


Status: Acute  











Brief Hospital Course


Allergies





                                    Allergies








Coded Allergies Type Severity Reaction Last Updated Verified


 


  acetaminophen Allergy Intermediate  10/8/20 Yes


 


  albuterol Adverse Reaction Intermediate  1/22/20 Yes


 


  ibuprofen Adverse Reaction Intermediate Nausea and Vomiting 1/22/20 Yes








Vital Signs





Vital Signs








  Date Time  Temp Pulse Resp B/P (MAP) Pulse Ox O2 Delivery O2 Flow Rate FiO2


 


7/6/21 11:00 98.1 109 20 123/63 (83) 99 Room Air  





 98.1       








Brief Hospital Course


Mr. Bishop  is a 61 old male who presented with chest pain after 

methamphetamine abuse.  Consultation was placed to cardiology.  EKG remained 

unchanged from prior, telemetry readings were unremarkable, and troponins have 

been negative. Plan to discharge to outpatient rehab facility in [place], [place]

where he can receive drug counseling.





Discharge Information


Condition at Discharge:  Stable


Disposition/Orders:  D/C to Home


Scheduled


Aspirin (Aspirin) 81 Mg Tab.chew, 1 TAB PO DAILY, #10


   Prescribed by: ELROY NORIEGA D.O. on 9/2/20 0607


   Last Action: Continued on 7/2/21 2302 by WHIT BOLDEN


Clopidogrel Bisulfate (Plavix) 75 Mg Tablet, 75 MG PO DAILY for TO PREVENT BLOOD

CLOTS, #30 Ref 0 (Reported)


   Entered as Reported by: Madelyn Sy on 4/17/15 2308


   Last Action: Continued on 7/2/21 2302 by WHIT SPICIELO


Metoprolol Succinate (Metoprolol Succinate ( Xl )) 25 Mg Tab.er.24h, 12.5 MG PO 

DAILY for FOR HYPERTENSION, #30 Ref 0 (Reported)


   Entered as Reported by: DEBBIE SLOAN RPH on 1/22/20 0814


   Last Action: Continued on 7/2/21 2302 by WHIT JACOBESS


Pregabalin (Lyrica) 300 Mg Capsule, 1 CAP PO BID, #60 Ref 2 (Reported)


   Entered as Reported by: TOM MONTE on 6/14/16 0412


   Last Action: Converted on 7/2/21 2302 by WHIT SPIESS


Rosuvastatin Calcium (Crestor) 40 Mg Tablet, 40 MG PO HS for FOR CHOLESTEROL, 

#30 Ref 0 (Reported)


   Entered as Reported by: BARBI MANE, RN on 8/7/20 1803


   Last Action: Converted on 7/2/21 2302 by WHIT BOLDEN


Tiotropium Brom[place] (Spiriva) 18 Mcg Cap.w.dev, 1 CAP IH DAILY, #30 Ref 3 

(Reported)


   Entered as Reported by: TOM MONTE on 6/14/16 0412





Scheduled PRN


Nitroglycerin (NITROGLYCERIN SubLingual) 0.4 Mg Tab.subl, 0.4 MG SL PRN Q5MIN 

PRN for CHEST PAIN, (Reported)


   Entered as Reported by: TOM MONTE on 6/14/16 0412


   Last Action: Continued on 7/2/21 2302 by WHIT BOLDEN





Justicifation of Admission Dx:


Justifications for Admission:


Justification of Admission Dx:  N/A


Angina:  Symp at Rest











SG OLIVO MD             Jul 6, 2021 11:54

## 2021-07-06 NOTE — NUR
CAB PASS OBTAINED FOR [address], [facility], [facility]. SPOKE TO LAUREN AT [facility]. 
BOOKING NUMBER [phone].

## 2021-07-06 NOTE — NUR
Discharge Note:



LEFTY RIOS [facility]



Discharge instructions and discharge home medications reviewed with Patient and a copy 
given. All questions have been answered and understanding verbalized. 



The following instructions and handouts were given: METHAMPHETAMINE USE, SUBSTANCE ABUSE, 
CP. PT SAID, "DON'T EVEN BOTHER READING THAT TO ME". WHEN I SAID I NEEDED TO EDUCATE HIM, HE 
SAID, "DON'T EVEN GO THERE".



Discontinued lines and drains: Peripheral IV intact.



Patient discharged to Home or Self Care with Self via [employer]

## 2021-07-20 ENCOUNTER — HOSPITAL ENCOUNTER (EMERGENCY)
Dept: HOSPITAL 61 - ER | Age: 61
LOS: 1 days | Discharge: HOME | End: 2021-07-21
Payer: MEDICAID

## 2021-07-20 VITALS — BODY MASS INDEX: 22.49 KG/M2 | HEIGHT: 67 IN | WEIGHT: 143.3 LBS

## 2021-07-20 DIAGNOSIS — Z88.6: ICD-10-CM

## 2021-07-20 DIAGNOSIS — E78.00: ICD-10-CM

## 2021-07-20 DIAGNOSIS — R06.02: ICD-10-CM

## 2021-07-20 DIAGNOSIS — F17.200: ICD-10-CM

## 2021-07-20 DIAGNOSIS — I11.0: ICD-10-CM

## 2021-07-20 DIAGNOSIS — R07.89: Primary | ICD-10-CM

## 2021-07-20 DIAGNOSIS — Z95.5: ICD-10-CM

## 2021-07-20 DIAGNOSIS — R11.0: ICD-10-CM

## 2021-07-20 DIAGNOSIS — I25.10: ICD-10-CM

## 2021-07-20 DIAGNOSIS — I25.2: ICD-10-CM

## 2021-07-20 DIAGNOSIS — F10.20: ICD-10-CM

## 2021-07-20 DIAGNOSIS — I50.9: ICD-10-CM

## 2021-07-20 DIAGNOSIS — Z86.73: ICD-10-CM

## 2021-07-20 DIAGNOSIS — Z88.8: ICD-10-CM

## 2021-07-20 DIAGNOSIS — J45.909: ICD-10-CM
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
## 2021-07-20 LAB
% LYMPHS: 6 % (ref 24–48)
% MONOS: 1 % (ref 0–10)
% SEGS: 92 % (ref 35–66)
BASOPHILS # BLD AUTO: 0 X10^3/UL (ref 0–0.2)
BASOPHILS NFR BLD: 1 % (ref 0–3)
EOSINOPHIL NFR BLD AUTO: 1 % (ref 0–5)
EOSINOPHIL NFR BLD: 0 X10^3/UL (ref 0–0.7)
EOSINOPHIL NFR BLD: 1 % (ref 0–3)
ERYTHROCYTE [DISTWIDTH] IN BLOOD BY AUTOMATED COUNT: 16.1 % (ref 11.5–14.5)
HCT VFR BLD CALC: 33.7 % (ref 39–53)
HGB BLD-MCNC: 11.4 G/DL (ref 13–17.5)
LYMPHOCYTES # BLD: 0.2 X10^3/UL (ref 1–4.8)
LYMPHOCYTES NFR BLD AUTO: 4 % (ref 24–48)
MCH RBC QN AUTO: 30 PG (ref 25–35)
MCHC RBC AUTO-ENTMCNC: 34 G/DL (ref 31–37)
MCV RBC AUTO: 90 FL (ref 79–100)
MONO #: 0.1 X10^3/UL (ref 0–1.1)
MONOCYTES NFR BLD: 2 % (ref 0–9)
NEUT #: 4.6 X10^3/UL (ref 1.8–7.7)
NEUTROPHILS NFR BLD AUTO: 93 % (ref 31–73)
PLATELET # BLD AUTO: 281 X10^3/UL (ref 140–400)
PLATELET # BLD EST: ADEQUATE 10*3/UL
RBC # BLD AUTO: 3.75 X10^6/UL (ref 4.3–5.7)
WBC # BLD AUTO: 5 X10^3/UL (ref 4–11)

## 2021-07-20 PROCEDURE — 71045 X-RAY EXAM CHEST 1 VIEW: CPT

## 2021-07-20 PROCEDURE — 96374 THER/PROPH/DIAG INJ IV PUSH: CPT

## 2021-07-20 PROCEDURE — 83880 ASSAY OF NATRIURETIC PEPTIDE: CPT

## 2021-07-20 PROCEDURE — 85025 COMPLETE CBC W/AUTO DIFF WBC: CPT

## 2021-07-20 PROCEDURE — 85007 BL SMEAR W/DIFF WBC COUNT: CPT

## 2021-07-20 PROCEDURE — 93005 ELECTROCARDIOGRAM TRACING: CPT

## 2021-07-20 PROCEDURE — 99285 EMERGENCY DEPT VISIT HI MDM: CPT

## 2021-07-20 PROCEDURE — 80053 COMPREHEN METABOLIC PANEL: CPT

## 2021-07-20 PROCEDURE — 84484 ASSAY OF TROPONIN QUANT: CPT

## 2021-07-20 PROCEDURE — 36415 COLL VENOUS BLD VENIPUNCTURE: CPT

## 2021-07-20 NOTE — PHYS DOC
Past Medical History


Past Medical History:  Asthma, CAD, CHF, CVA, High Cholesterol, Hypertension, 

MI, Other


Additional Past Medical Histor:  5 MI and 15 stents


Past Surgical History:  Other


Additional Past Surgical Histo:  PM 2020


Smoking Status:  Current Every Day Smoker


Alcohol Use:  Heavy


Drug Use:  Methamphetamine





General Adult


EDM:


Chief Complaint:  CHEST PAIN





HPI:


HPI:





Patient is a 61  year old male past medical history hypertension hyperlipidemia 

coronary artery disease presents with a chief complaint of chest pain.  Patient 

states chest pain started approximately 45 minutes prior to arrival.  Patient's 

pain located center of her chest with radiation to the jaw and left arm.  

Patient pain is described as a pressure it comes and goes and fluctuates in 

intensity from a 7 to a 9.  Patient states he has associated nausea and 

shortness of breath.  Patient states he took his aspirin this a.m.  Patient took

nitroglycerin with minimal relief.





Review of Systems:


Constitutional symptoms-  No fever, no chills.


Eyes- No Discharge, No Visual Loss


Respiratory symptoms-  Positive shortness of breath, No wheezing, No Dyspnea on 

Exertion


Cardiovascular Systems; Positive chest pain, No Palpitations, No syncope


Gastrointestinal symptoms:  NO abdominal pain, Positive nausea, no vomiting or 

diarrhea.


Genitourinary symptoms:  No dysuria.  


Musculoskeletal symptoms:  No back pain  No extremity pain.


NEUROLOGICAL Symptoms:  No headache, no generalized weakness; No focal Weakness


Skin: No rash.





Heart Score:


C/O Chest Pain:  Yes


HEART Score for Chest Pain:  








HEART Score for Chest Pain Response (Comments) Value


 


History Slighlty/Non-Suspicious 0


 


ECG Nonspecific Repolarizatio 1


 


Age >45 - < 65 1


 


Risk Factors >3 Risk Factors or Hx CAD 2


 


Troponin < Normal Limit 0


 


Total  4








Risk Factors:


Risk Factors:  DM, Current or recent (<one month) smoker, HTN, HLP, family 

history of CAD, obesity.


Risk Scores:


Score 0 - 3:  2.5% MACE over next 6 weeks - Discharge Home


Score 4 - 6:  20.3% MACE over next 6 weeks - Admit for Clinical Observation


Score 7 - 10:  72.7% MACE over next 6 weeks - Early Invasive Strategies





Allergies:


Allergies:





Allergies








Coded Allergies Type Severity Reaction Last Updated Verified


 


  acetaminophen Allergy Intermediate  10/8/20 Yes


 


  albuterol Adverse Reaction Intermediate  1/22/20 Yes


 


  ibuprofen Adverse Reaction Intermediate Nausea and Vomiting 1/22/20 Yes











Physical Exam:


PE:


General: alert, no acute distress.


Skin: warm, dry and intact.


HENT: bilateral external ears normal, oropharynx moist, nose normal.


Head::  Normocephalic, atraumatic.


Neck:   Trachea midline.


Eyes: EOMI, Normal conjunctiva, No drainage


CARDIOVASCULAR: Tachycardic


RESPIRATORY:  No respiratory distress


Back:  Full range of motion.


Skin: Warm, dry, no erythema, no rash. 


MUSCULOSKELETAL:  Full range of motion of bilateral upper and lower extremities.


GASTROINTESTINAL: Abdomen soft without rebound or guarding.


NEUROLOGICAL:  Alert and noted to person, place and time.  No neurological 

deficits observed


Psychiatric:  Cooperative.  Normal judgment





Current Patient Data:


Labs:





                                Laboratory Tests








Test


 7/20/21


23:05


 


White Blood Count


 5.0 x10^3/uL


(4.0-11.0)


 


Red Blood Count


 3.75 x10^6/uL


(4.30-5.70)  L


 


Hemoglobin


 11.4 g/dL


(13.0-17.5)  L


 


Hematocrit


 33.7 %


(39.0-53.0)  L


 


Mean Corpuscular Volume


 90 fL ()





 


Mean Corpuscular Hemoglobin 30 pg (25-35)  


 


Mean Corpuscular Hemoglobin


Concent 34 g/dL


(31-37)


 


Red Cell Distribution Width


 16.1 %


(11.5-14.5)  H


 


Platelet Count


 281 x10^3/uL


(140-400)


 


Neutrophils (%) (Auto) 93 % (31-73)  H


 


Lymphocytes (%) (Auto) 4 % (24-48)  L


 


Monocytes (%) (Auto) 2 % (0-9)  


 


Eosinophils (%) (Auto) 1 % (0-3)  


 


Basophils (%) (Auto) 1 % (0-3)  


 


Neutrophils # (Auto)


 4.6 x10^3/uL


(1.8-7.7)


 


Lymphocytes # (Auto)


 0.2 x10^3/uL


(1.0-4.8)  L


 


Monocytes # (Auto)


 0.1 x10^3/uL


(0.0-1.1)


 


Eosinophils # (Auto)


 0.0 x10^3/uL


(0.0-0.7)


 


Basophils # (Auto)


 0.0 x10^3/uL


(0.0-0.2)


 


Platelet Estimate Pending  





                                Laboratory Tests


7/20/21 23:05








Vital Signs:





                                   Vital Signs








  Date Time  Temp Pulse Resp B/P (MAP) Pulse Ox O2 Delivery O2 Flow Rate FiO2


 


7/20/21 22:57  95 22 106/63 (77) 97 Room Air  


 


7/20/21 22:50 98.1       





 98.1       











EKG:


EKG:


[]


Performed at 2252


Rate 101


Sinus tachycardia PVCs


No ST depression


No acute MI





Radiology/Procedures:


Radiology/Procedures:


[]





Course & Med Decision Making:


Course & Med Decision Making


Pertinent Labs and Imaging studies reviewed. (See chart for details)





[] Patient was evaluated for chief complaint.  Work-up up consisted of 

laboratory analysis radiologic imaging and EKG.  Results reviewed and discussed 

with patient.  Lab troponin x2 within normal limits chest x-ray abnormalities.  

EKG compared to previous no acute changes.  Patient received morphine for pain. 

 Patient's chest pain improved prior to discharge.  Patient past medical records

 reviewed.  Union the patient was stable for discharged.  Patient discharged home

 with instruction to follow-up with primary care physician.





Dannielle Disclaimer:


Dragon Disclaimer:


This electronic medical record was generated, in whole or in part, using a voice

 recognition dictation system.





Departure


Departure


Impression:  


   Primary Impression:  


   Chest pain


Disposition:  01 HOME / SELF CARE / HOMELESS


Condition:  STABLE


Referrals:  


NO PCP (PCP)


Patient Instructions:  Chest Pain (Nonspecific)











CARMELITA WARE DO            Jul 20, 2021 23:33

## 2021-07-20 NOTE — RAD
XR CHEST 1V 



INDICATION: chest pain . 



COMPARISON STUDY: 7/2/2021.



FINDINGS:

Patient is rotated.



Left pectoral ICD/pacemaker.



Lungs: Normal lung volume. No pulmonary mass or consolidation. The tracheobronchial tree and hilar st
ructures are normal.



Pleura: No pleural effusion or pneumothorax.



Heart and Mediastinum: Stable cardiomediastinal silhouette and great vessels.



IMPRESSION:  

No focal airspace disease.



Electronically signed by: Jeremiah Arias MD (7/20/2021 11:44 PM) [facility]

## 2021-07-20 NOTE — EKG
[facility]

              [address] [place] [address]

Test Date:    2021               Test Time:    22:52:17

Pat Name:     JOB RIOS           Department:   

Patient ID:   [id number]           Room:          

Gender:       M                        Technician:   

:          1960               Requested By: CARMELITA WARE

Order Number: [id number]           Reading MD:     

                                 Measurements

Intervals                              Axis          

Rate:         101                      P:            37

UT:           92                       QRS:          31

QRSD:         148                      T:            -139

QT:           388                                    

QTc:          511                                    

                           Interpretive Statements

SINUS TACHYCARDIA

VENTRICULAR PREMATURE COMPLEX(ES)

LEFT ATRIAL ABNORMALITY

CONSIDER WPW, TYPE B

ST & T ABNORMALITY, CONSIDER

INFEROLATERAL ISCHEMIA OR LEFT VENTRICULAR STRAIN

ABNORMAL ECG

RI6.01

No previous ECG available for comparison

## 2021-07-21 VITALS — SYSTOLIC BLOOD PRESSURE: 124 MMHG | DIASTOLIC BLOOD PRESSURE: 66 MMHG

## 2021-07-21 LAB
ALBUMIN SERPL-MCNC: 3.2 G/DL (ref 3.4–5)
ALBUMIN/GLOB SERPL: 1 {RATIO} (ref 1–1.7)
ALP SERPL-CCNC: 95 U/L (ref 46–116)
ALT SERPL-CCNC: 20 U/L (ref 16–63)
ANION GAP SERPL CALC-SCNC: 9 MMOL/L (ref 6–14)
AST SERPL-CCNC: 12 U/L (ref 15–37)
BILIRUB SERPL-MCNC: 0.3 MG/DL (ref 0.2–1)
BUN SERPL-MCNC: 9 MG/DL (ref 8–26)
BUN/CREAT SERPL: 8 (ref 6–20)
CALCIUM SERPL-MCNC: 8.6 MG/DL (ref 8.5–10.1)
CHLORIDE SERPL-SCNC: 103 MMOL/L (ref 98–107)
CO2 SERPL-SCNC: 25 MMOL/L (ref 21–32)
CREAT SERPL-MCNC: 1.1 MG/DL (ref 0.7–1.3)
GFR SERPLBLD BASED ON 1.73 SQ M-ARVRAT: 68.1 ML/MIN
GLUCOSE SERPL-MCNC: 166 MG/DL (ref 70–99)
POTASSIUM SERPL-SCNC: 4.2 MMOL/L (ref 3.5–5.1)
PROT SERPL-MCNC: 6.5 G/DL (ref 6.4–8.2)
SODIUM SERPL-SCNC: 137 MMOL/L (ref 136–145)

## 2021-08-15 NOTE — EKG
[facility]

              [address] [place] [address]

Test Date:    2021-08-15               Test Time:    05:19:33

Pat Name:     JOB RIOS           Department:   

Patient ID:   [id number]           Room:          

Gender:       M                        Technician:   WENDI

:          1960               Requested By: CARMELITA WARE

Order Number: [id number]           Reading MD:     

                                 Measurements

Intervals                              Axis          

Rate:         92                       P:            39

CO:           102                      QRS:          28

QRSD:         154                      T:            -118

QT:           396                                    

QTc:          495                                    

                           Interpretive Statements

SINUS RHYTHM

LEFT ATRIAL ABNORMALITY

NON SPECIFIC INTRAVENTRICULAR BLOCK

ABNORMAL ECG

RI6.01

No previous ECG available for comparison

## 2021-08-15 NOTE — RAD
AP chest x-ray



HISTORY: Chest pain.



COMPARISON: Chest x-ray July 20, 2021



FINDINGS: 3-lead implanted cardiac device. Coronary stents. Cardiomegaly is stable. No pneumothorax, 
pulmonary opacities or pleural effusions. Bones are unremarkable.



IMPRESSION: No acute process. Mild cardiomegaly is stable.



Electronically signed by: Nuno Padilla MD (8/15/2021 5:50 AM) [facility]

## 2021-08-15 NOTE — PHYS DOC
NUTRITION - DAILY TREATMENT NOTE  Patient Name: Kathy Weinberg. Date: 2017  : 1959    YES Patient  Verified  Insurance: Payor: Barbee Mcardle / Plan: Lita Stalling PPO / Product Type: PPO /    Diagnosis: In time:   1000             Out time:   1030   Total Treatment Time (min):   30     SUBJECTIVE    Medication Changes/New allergies or changes in medical history, any new surgeries or procedures? NO    If yes, update Summary List   Subjective Functional Status/Changes:  []  No changes reported    Pt has been doing well. He has had his A1C checked again and it has gone form 7.7 to 7.1. He is losing weight as well, no lipid panel check this past labs check. Pt has been choosing lean cuts on meat (chicken/fish) and also consuming veggies as largest portion. She is still working on reducing protein sizes for other foods and provided tips on how to do so. Pt is drinking a great amount of water and has increased his exercise to walking every other day and biking 1-2 times per week. He and his son keep each other accountable. (pt daughter is in the [employer] reserves). Pt has been doing very well with making changes and staying on track, will continue to work with and advise.     Current Wt: 269 Previous Wt: 271.2 Wt Change: -2.2     OBJECTIVE    Patient Education:  [x]  Review current plan with patient   []  Other:    Handouts/  Information Provided: []  Carbohydrates  []  Protein  []  Fiber  []  Serving Sizes  []  Fluids  []  General guidelines []  Diabetes  []  Cholesterol  []  Sodium  []  SBGM  []  Food Journals  []  Others:    Estimated Needs: 1800 180 135 60    Calories Protein CHO Fat     ASSESSMENT     [x]  Pt Progressing Well  []  Slow Progress []  No Progress    Other:    [x]  Understand Dietary Changes/Recs []  No Change []  Improving []  N/A   [x]  Weight []  No Change [x]  Improving []  N/A   x  Glucose Levels []  No Change [x]  Improving []  N/A   [x]  Cholesterol Levels [x]  No Change [] Past Medical History


Past Medical History:  Asthma, CAD, CHF, CVA, High Cholesterol, Hypertension, 

MI, Other


Additional Past Medical Histor:  5 MI and 15 stents


 (CARMELITA ALEMAN DO)


Past Surgical History:  Other


Additional Past Surgical Histo:  PM 2020


 (CARMELITA ALEMAN DO)


Smoking Status:  Current Every Day Smoker


Alcohol Use:  Heavy


Drug Use:  Methamphetamine


 (CARMELITA ALEMAN DO)





General Adult


HPI:


HPI:





Patient is a 61  year old-year-old male past medical history of coronary artery 

disease status post multiple stents presents with a chief complaint of chest 

pain.  Patient states chest pain started approximately 45 minutes prior to 

arrival. Patient pain woke him up from his sleep.  Patient describes pain as a 

pressure in his left chest that radiates to his left shoulder and left jaw.  

Patient has associated shortness of breath he denies any nausea or vomiting.  

Home treatment included aspirin and nitroglycerin x3 with no improvement.  








Patient lives at the [facility].


States he was at a friends house over night.


Pain woke him up from is sleep.





Past charts reviewed-- Patient hear monthly since May.





History of drug abuse-- states he has been clean for weeks.


 (CARMELITA ALEMAN DO)





Review of Systems:


Constitutional symptoms-  No fever, no chills.


Eyes- No Discharge, No Visual Loss


Respiratory symptoms-  Positive shortness of breath, No wheezing, No Dyspnea on 

Exertion


Cardiovascular Systems; Positive chest pain, No Palpitations, No syncope


Gastrointestinal symptoms:  NO abdominal pain, no nausea, no vomiting or 

diarrhea.


Genitourinary symptoms:  No dysuria.  


Musculoskeletal symptoms:  No back pain  No extremity pain.


NEUROLOGICAL Symptoms:  No headache, no generalized weakness; No focal Weakness


Skin: No rash. 


 (CARMELITA ALEMAN DO)





Heart Score:


C/O Chest Pain:  Yes


HEART Score for Chest Pain:  








HEART Score for Chest Pain Response (Comments) Value


 


History Slighlty/Non-Suspicious 0


 


ECG Nonspecific Repolarizatio 1


 


Age >45 - < 65 1


 


Risk Factors >3 Risk Factors or Hx CAD 2


 


Total  4








Risk Factors:


Risk Factors:  DM, Current or recent (<one month) smoker, HTN, HLP, family 

history of CAD, obesity.


Risk Scores:


Score 0 - 3:  2.5% MACE over next 6 weeks - Discharge Home


Score 4 - 6:  20.3% MACE over next 6 weeks - Admit for Clinical Observation


Score 7 - 10:  72.7% MACE over next 6 weeks - Early Invasive Strategies


 (CARMELITA ALEMAN DO)





Allergies:


Allergies:





Allergies








Coded Allergies Type Severity Reaction Last Updated Verified


 


  acetaminophen Allergy Intermediate  10/8/20 Yes


 


  albuterol Adverse Reaction Intermediate  1/22/20 Yes


 


  ibuprofen Adverse Reaction Intermediate Nausea and Vomiting 1/22/20 Yes








 (CARMELITA ALEMAN DO)





Physical Exam:


PE:





Constitutional: Well developed, well nourished, no acute distress, non-toxic 

appearance. []


HENT: Normocephalic, atraumatic, bilateral external ears normal, oropharynx 

moist, no oral exudates, nose normal. []


Eyes: PERRLA, EOMI, conjunctiva normal, no discharge. [] 


Neck: Normal range of motion, no tenderness, supple, no stridor. [] 


Cardiovascular:Heart rate regular rhythm, no murmur []


Lungs & Thorax:  Bilateral breath sounds clear to auscultation []


Abdomen: Bowel sounds normal, soft, no tenderness, no masses, no pulsatile 

masses. [] 


Skin: Warm, dry, no erythema, no rash. [] 


Back: No tenderness, no CVA tenderness. [] 


Extremities: No tenderness, no cyanosis, no clubbing, ROM intact, no edema. [] 


Neurologic: Alert and oriented X 3, normal motor function, normal sensory 

function, no focal deficits noted. []


Psychologic: Affect normal, judgement normal, mood normal. []


 (CARMELITA ALEMAN DO)





EKG:


EKG:


[]


Performed at 0519


Rate 92


Paced rhythm


No ST elevation


No ST depression


No acute MI





No acute change when compared to previous EKG performed on 7/2/2021


 (JEANIE,CARMELITA ARRIAZA DO)





Radiology/Procedures:


Radiology/Procedures:


[]


Impression:


AP chest x-ray





HISTORY: Chest pain.





COMPARISON: Chest x-ray July 20, 2021





FINDINGS: 3-lead implanted cardiac device. Coronary stents. Cardiomegaly is 

stable. No pneumothorax, pulmonary opacities or pleural effusions. Bones are 

unremarkable.





IMPRESSION: No acute process. Mild cardiomegaly is stable.





Electronically signed by: Nuno Padilla MD (8/15/2021 5:50 AM) [facility]


 (JEANIE,CARMELITA ARRIAZA DO)





Course & Med Decision Making:


Course & Med Decision Making


Pertinent Labs and Imaging studies reviewed. (See chart for details)





[] Patient was evaluated for chief complaint.  Work-up consisted of laboratory 

analysis radiologic imaging and EKG.


EKG reviewed very similar to previous EKG 7/2/2021--no acute MI


Patient took aspirin prior to arrival.


Toradol ordered for pain.





Lab work all pending patient signed out to oncoming provider.


 (CARMELITA ALEMAN DO)


Course & Med Decision Making


I received this patient from Dr. Aleman in signout.


Briefly, patient is a 61-year-old male with a history of coronary artery disease

 with several recent admissions for chest discomfort which have resulted with 

negative work-ups.


His EKG is unchanged from previous.  First troponin was negative at the time of 

signout.  Plan was for a second troponin, and discharge home if negative.


During his stay he has exhibited drug seeking behavior, requesting narcotic pain

 medications repeatedly and becoming very angry when he did not have an IV 

placed initially.


Second troponin, approximately 6 hours after onset of chest pain is negative.  I

 have assessed the patient and agree with plan for discharge in light of his 

several recent admissions with negative work ups, and no evidence NSTEMI 

presently.





 (AMERICA HIGGINS MD)


Dragon Disclaimer:


Dragon Disclaimer:


This electronic medical record was generated, in whole or in part, using a voice

 recognition dictation system.


 (CARMELITA ALEMAN DO)





Departure


Departure


Impression:  


   Primary Impression:  


   Chest pain


Disposition:  01 HOME / SELF CARE / HOMELESS


Condition:  STABLE


Referrals:  


NO PCP (PCP)





Additional Instructions:  


There was no sign of new damage being done to your heart. 


We did not find any causes for your chest pain today. 


Please follow up with your outpatient team. 


If you develop new symptoms such as shortness of breath, fever, chills please 

return for re-evaluation.











CARMELITA ALEMAN DO            Aug 15, 2021 05:21


AMERICA HIGGINS MD              Aug 15, 2021 10:46 Improving []  N/A     RECOMMENDATIONS    1. Get a divided plate, or get chinette divided plates to help with portion control   2. 1-2 times per week have lean pork (tenderloin) or red meals (sirloin)   3. Try new foods, greek yogurt, veggies, meats (fish)   4.    5.       PLAN    [x]  Continue on current plan []  Follow-up PRN   []  Discharge due to :    [x]  Next Appt[de-identified] 9-28-17 @ [facility]     Dietitian: Laureen Ireland MA, RD    Date: 8/21/2017 Time: 10:02 AM

## 2023-03-15 NOTE — NUR
SS following for discharge planning. SS reviewed pt chart and discussed with pt RN. Pt is 
from home and is currently on room air. COVID19 negative. Pt was positive for 
Methamphetamine. Pt saw Blank from the PAT team on Saturday, 8/8/2020. SS was notified from 
Alonso at the PAT team that [facility] was contacted and pt was given resources for 
rehabilitation centers. Discharge order on the chart. SS met with pt to discuss. Pt reported 
that he originally wanted to go to [facility] in [place], [place] but they do not accept his 
Medicaid. Pt reported that he has contacted [facility] and [facility] in [place], [place] and 
requested inpatient treatment. Discharge order on the chart. John from PAT team coming to 
meet with pt and follow up on inpatient referrals. SS will continue to follow for discharge 
planning. Nostril Rim Text: The closure involved the nostril rim.

## 2024-11-05 NOTE — PDOC1
History and Physical


Date of Admission


Date of Admission


DATE: 7/3/21 


TIME: 09:25





Identification/Chief Complaint


Chief Complaint


chest pain after injecting meth yesterday





History of Present Illness


History of Present Illness


 61  year old male seen in ER  for left-sided pounding chest pain that radiates 

to his neck and arm./ here numerous occasions in the past for chest pain after 

using methamphetamines.  


states he injected methamphetamine about 1 hour prior to arrival, chest pains 

are about 15 minutes after that. " he is on a binge that has been going for the 

past 2 days"


he has significant cardiac history, has pacemaker/defibrillator present without 

any feelings of it firing.// history of numerous MIs, reports having x15 stents.

 He is on 81 mg of aspirin daily, Patient was evaluated and hemodynamically 

stable





Past Medical History


Past Medical History:  Asthma, CAD, CHF, CVA, High Cholesterol, Hypertension, 

MI, Other


Additional Past Medical Histor:  VTACH,IV Drug abuse-Methamphetamine


Past Surgical History:  Angioplasty, Pacemaker, Other


Additional Past Surgical Histo:  15 cardiac stents, multiple cardiac 

catheterizations,WITH DEFIB


Smoking Status:  Current Every Day Smoker


Alcohol Use:  Heavy


Drug Use:  Methamphetamine


Cardiovascular:  CAD, CHF, HTN, Hyperlipidemia, Other


Pulmonary:  Asthma


CENTRAL NERVOUS SYSTEM:  CVA


GI:  GERD


Heme/Onc:  No pertinent hx


Hepatobiliary:  No pertinent hx


Psych:  Anxiety


Musculoskeletal:  Osteoarthritis


Rheumatologic:  No pertinent hx


Infectious disease:  No pertinent hx


Renal/:  No pertinent hx


Endocrine:  No pertinent hx





Past Surgical History


Past Surgical History:  Pacemaker, Other





Family History


Family History:  Hypertension





Social History


Smoke:  <1 pack per day


ALCOHOL:  occassional


Drugs:  Marijuana, Crystal meth





Current Problem List


Problem List


Problems


Medical Problems:


(1) Chest pain


Status: Acute  





(2) Substance abuse


Status: Acute  











Current Medications


Current Medications





Current Medications


Aspirin (Aspirin Chewable) 324 mg 1X  ONCE PO  Last administered on 7/2/21at 

16:16;  Start 7/2/21 at 16:00;  Stop 7/2/21 at 16:01;  Status DC


Sodium Chloride 1,000 ml @  1,000 mls/hr 1X  ONCE IV  Last administered on 

7/2/21at 16:16;  Start 7/2/21 at 16:00;  Stop 7/2/21 at 16:59;  Status DC


Hydroxyzine HCl (Atarax) 25 mg 1X  PRN PO ITCHING Last administered on 7/2/21at 

17:14;  Start 7/2/21 at 16:45


Iohexol (Omnipaque 350 Mg/ml) 90 ml 1X  ONCE IV  Last administered on 7/2/21at 

18:14;  Start 7/2/21 at 18:00;  Stop 7/2/21 at 18:01;  Status DC


Info (CONTRAST GIVEN -- Rx MONITORING) 1 each PRN DAILY  PRN MC SEE COMMENTS;  

Start 7/2/21 at 18:00;  Stop 7/4/21 at 17:59


Aspirin (Aspirin Chewable) 81 mg DAILY PO ;  Start 7/3/21 at 09:00


Clopidogrel Bisulfate (Plavix) 75 mg DAILY PO ;  Start 7/3/21 at 09:00


Metoprolol Succinate (Toprol Xl) 12.5 mg DAILY PO ;  Start 7/3/21 at 09:00


Nitroglycerin (Nitrostat) 0.4 mg PRN Q5MIN  PRN SL CHEST PAIN;  Start 7/2/21 at 

23:00


Pregabalin (Lyrica) 300 mg BID PO ;  Start 7/3/21 at 09:00


Atorvastatin Calcium (Lipitor) 80 mg QHS PO ;  Start 7/3/21 at 21:00





Active Scripts


Active


Aspirin 81 Mg Tab.chew 1 Tab PO DAILY


Reported


Crestor (Rosuvastatin Calcium) 40 Mg Tablet 40 Mg PO HS


Metoprolol Succinate ( Xl ) (Metoprolol Succinate) 25 Mg Tab.er.24h 12.5 Mg PO 

DAILY


Spiriva (Tiotropium [place]) 18 Mcg Cap.w.dev 1 Cap IH DAILY


NITROGLYCERIN SubLingual (Nitroglycerin) 0.4 Mg Tab.subl 0.4 Mg SL PRN Q5MIN PRN


Lyrica (Pregabalin) 300 Mg Capsule 1 Cap PO BID


Plavix (Clopidogrel Bisulfate) 75 Mg Tablet 75 Mg PO DAILY





Allergies


Allergies:  


Coded Allergies:  


     acetaminophen (Verified  Allergy, Intermediate, 10/8/20)


     albuterol (Verified  Adverse Reaction, Intermediate, 1/22/20)


   Patient states "it speeds my heart up too much"


     ibuprofen (Verified  Adverse Reaction, Intermediate, Nausea and Vomiting, 

1/22/20)





ROS


General:  YES: Fatigue, Malaise, Appetite; 


   No: Chills, Night Sweats, Other


PSYCHOLOGICAL ROS:  YES: Anxiety, Depression; 


   No: Behavioral Disorder, Concentration difficultie, Decreased libido, 

Disorientation, Hallucinations, Hostility, Irritablity, Memory difficulties, 

Mood Swings, Obsessive thoughts, Physical abuse, Sexual abuse, Sleep 

disturbances, Suicidal ideation, Other


Eyes:  No Blurry vision, No Decreased vision, No Double vision, No Dry eyes, No 

Excessive tearing, No Eye Pain, No Itchy Eyes, No Loss of vision, No 

Photophobia, No Scotomata, No Uses contacts, No Uses glasses, No Other


HEENT:  No: Heacaches, Visual Changes, Hearing change, Nasal congestion, Nasal 

discharge, Oral lesions, Sinus pain, Sore Throat, Epistaxis, Sneezing, Snoring, 

Tinnitus, Vertigo, Vocal changes, Other


ALLERGY AND IMMUNOLOGY:  YES: Hives; 


   No: Insect Bite Sensitivity, Itchy/Watery Eyes, Nasal Congestion, Post Nasal 

Drip, Seasonal Allergies, Other


Hematological and Lymphatic:  No: Bleeding Problems, Blood Clots, Blood 

Transfusions, Brusing, Night Sweats, Pallor, Swollen Lymph Nodes, Other


ENDOCRINE:  No: Breast Changes, Galactorrhea, Hair Pattern Changes, Hot Flashes,

Malaise/lethargy, Mood Swings, Palpitations, Polydipsia/polyuria, Skin Changes, 

Temperature Intolerance, Unexpected Weight Changes, Other


Breast:  No New/Changing Breast Lumps, No Nipple changes, No Nipple discharge, 

No Other


Respiratory:  No: Cough, Hemoptysis, Orthopnea, Pleuritic Pain, Shortness of 

breath, SOB with excertion, Sputum Changes, Stridor, Tachypnea, Wheezing, Other


Cardiovascular:  yes Chest Pain, yes Palpitations; 


   No Orthopnea, No Paroxysmal Noc. Dyspnea, No Edema, No Lt Headedness, No 

Other


Gastrointestinal:  No Nausea, No Vomiting, No Abdominal Pain, No Diarrhea, No 

Constipation, No Melena, No Hematochezia, No Other


Genitourinary:  No Dysuria, No Frequency, No Incontinence, No Hematuria, No 

Retention, No Discharge, No Urgency, No Pain, No Flank Pain, No Other, No , No ,

No , No , No , No , No 


Musculoskeletal:  No Gait Disturbance, No Joint Pain, No Joint Stiffness, No Dagmar

nt Swelling, No Muscle Pain, No Muscular Weakness, No Pain In:, No Swelling In:,

No Other


Neurological:  No Behavorial Changes, No Bowel/Bladder ControlChng, No 

Confusion, No Dizziness, No Gait Disturbance, No Headaches, No Impaired Co

ord/balance, No Memory Loss, No Numbness/Tingling, No Seizures, No Speech 

Problems, No Tremors, No Visual Changes, No Weakness, No Other


Skin:  Yes Dry Skin; 


   No Eczema, No Hair Changes, No Lumps, No Mole Changes, No Mottling, No Nail 

Changes, No Pruritus, No Rash, No Skin Lesion Changes, No Other, No Acne





Physical Exam


General:  Alert, Oriented X3, Cooperative, No acute distress


HEENT:  PERRLA


Lungs:  Clear to auscultation, Normal air movement


Heart:  RRR, no thrills, no gallops


Breasts:  Not examined


Abdomen:  Normal bowel sounds, Soft


Rectal Exam:  not examined


Extremities:  No edema


Neuro:  Normal gait, Normal speech, Strength at 5/5 X4 ext, Normal tone, 

Sensation intact, Cranial nerves 3-12 NL, Reflexes 2+


Psych/Mental Status:  Mental status NL





Vitals


Vitals





Vital Signs








  Date Time  Temp Pulse Resp B/P (MAP) Pulse Ox O2 Delivery O2 Flow Rate FiO2


 


7/3/21 07:40      Room Air  


 


7/3/21 07:00 98.2 85 21 82/50 (61) 98   





 98.2       











Labs


Labs





Laboratory Tests








Test


 7/2/21


16:21 7/2/21


19:46


 


White Blood Count


 4.5 x10^3/uL


(4.0-11.0) 





 


Red Blood Count


 3.35 x10^6/uL


(4.30-5.70) 





 


Hemoglobin


 10.0 g/dL


(13.0-17.5) 





 


Hematocrit


 29.8 %


(39.0-53.0) 





 


Mean Corpuscular Volume 89 fL ()  


 


Mean Corpuscular Hemoglobin 30 pg (25-35)  


 


Mean Corpuscular Hemoglobin


Concent 34 g/dL


(31-37) 





 


Red Cell Distribution Width


 16.0 %


(11.5-14.5) 





 


Platelet Count


 215 x10^3/uL


(140-400) 





 


Neutrophils (%) (Auto) 63 % (31-73)  


 


Lymphocytes (%) (Auto) 15 % (24-48)  


 


Monocytes (%) (Auto) 15 % (0-9)  


 


Eosinophils (%) (Auto) 6 % (0-3)  


 


Basophils (%) (Auto) 1 % (0-3)  


 


Neutrophils # (Auto)


 2.8 x10^3/uL


(1.8-7.7) 





 


Lymphocytes # (Auto)


 0.7 x10^3/uL


(1.0-4.8) 





 


Monocytes # (Auto)


 0.7 x10^3/uL


(0.0-1.1) 





 


Eosinophils # (Auto)


 0.3 x10^3/uL


(0.0-0.7) 





 


Basophils # (Auto)


 0.0 x10^3/uL


(0.0-0.2) 





 


D-Dimer (Camryn)


 0.98 ug/mlFEU


(0.00-0.50) 





 


Sodium Level


 140 mmol/L


(136-145) 





 


Potassium Level


 3.6 mmol/L


(3.5-5.1) 





 


Chloride Level


 104 mmol/L


() 





 


Carbon Dioxide Level


 25 mmol/L


(21-32) 





 


Anion Gap 11 (6-14)  


 


Blood Urea Nitrogen


 10 mg/dL


(8-26) 





 


Creatinine


 1.0 mg/dL


(0.7-1.3) 





 


Estimated GFR


(Cockcroft-Gault) 76.0 


 





 


BUN/Creatinine Ratio 10 (6-20)  


 


Glucose Level


 84 mg/dL


(70-99) 





 


Lactic Acid Level


 2.5 mmol/L


(0.4-2.0) 0.5 mmol/L


(0.4-2.0)


 


Calcium Level


 8.8 mg/dL


(8.5-10.1) 





 


Magnesium Level


 2.0 mg/dL


(1.8-2.4) 





 


Total Bilirubin


 1.1 mg/dL


(0.2-1.0) 





 


Aspartate Amino Transf


(AST/SGOT) 15 U/L (15-37) 


 





 


Alanine Aminotransferase


(ALT/SGPT) 18 U/L (16-63) 


 





 


Alkaline Phosphatase


 110 U/L


() 





 


Creatine Kinase


 227 U/L


() 





 


Troponin I Quantitative


 < 0.017 ng/mL


(0.000-0.055) < 0.017 ng/mL


(0.000-0.055)


 


NT-Pro-B-Type Natriuretic


Peptide 892 pg/mL


(0-124) 





 


Total Protein


 6.9 g/dL


(6.4-8.2) 





 


Albumin


 3.4 g/dL


(3.4-5.0) 





 


Albumin/Globulin Ratio 1.0 (1.0-1.7)  








Laboratory Tests








Test


 7/2/21


16:21 7/2/21


19:46


 


White Blood Count


 4.5 x10^3/uL


(4.0-11.0) 





 


Red Blood Count


 3.35 x10^6/uL


(4.30-5.70) 





 


Hemoglobin


 10.0 g/dL


(13.0-17.5) 





 


Hematocrit


 29.8 %


(39.0-53.0) 





 


Mean Corpuscular Volume 89 fL ()  


 


Mean Corpuscular Hemoglobin 30 pg (25-35)  


 


Mean Corpuscular Hemoglobin


Concent 34 g/dL


(31-37) 





 


Red Cell Distribution Width


 16.0 %


(11.5-14.5) 





 


Platelet Count


 215 x10^3/uL


(140-400) 





 


Neutrophils (%) (Auto) 63 % (31-73)  


 


Lymphocytes (%) (Auto) 15 % (24-48)  


 


Monocytes (%) (Auto) 15 % (0-9)  


 


Eosinophils (%) (Auto) 6 % (0-3)  


 


Basophils (%) (Auto) 1 % (0-3)  


 


Neutrophils # (Auto)


 2.8 x10^3/uL


(1.8-7.7) 





 


Lymphocytes # (Auto)


 0.7 x10^3/uL


(1.0-4.8) 





 


Monocytes # (Auto)


 0.7 x10^3/uL


(0.0-1.1) 





 


Eosinophils # (Auto)


 0.3 x10^3/uL


(0.0-0.7) 





 


Basophils # (Auto)


 0.0 x10^3/uL


(0.0-0.2) 





 


D-Dimer (Camryn)


 0.98 ug/mlFEU


(0.00-0.50) 





 


Sodium Level


 140 mmol/L


(136-145) 





 


Potassium Level


 3.6 mmol/L


(3.5-5.1) 





 


Chloride Level


 104 mmol/L


() 





 


Carbon Dioxide Level


 25 mmol/L


(21-32) 





 


Anion Gap 11 (6-14)  


 


Blood Urea Nitrogen


 10 mg/dL


(8-26) 





 


Creatinine


 1.0 mg/dL


(0.7-1.3) 





 


Estimated GFR


(Cockcroft-Gault) 76.0 


 





 


BUN/Creatinine Ratio 10 (6-20)  


 


Glucose Level


 84 mg/dL


(70-99) 





 


Lactic Acid Level


 2.5 mmol/L


(0.4-2.0) 0.5 mmol/L


(0.4-2.0)


 


Calcium Level


 8.8 mg/dL


(8.5-10.1) 





 


Magnesium Level


 2.0 mg/dL


(1.8-2.4) 





 


Total Bilirubin


 1.1 mg/dL


(0.2-1.0) 





 


Aspartate Amino Transf


(AST/SGOT) 15 U/L (15-37) 


 





 


Alanine Aminotransferase


(ALT/SGPT) 18 U/L (16-63) 


 





 


Alkaline Phosphatase


 110 U/L


() 





 


Creatine Kinase


 227 U/L


() 





 


Troponin I Quantitative


 < 0.017 ng/mL


(0.000-0.055) < 0.017 ng/mL


(0.000-0.055)


 


NT-Pro-B-Type Natriuretic


Peptide 892 pg/mL


(0-124) 





 


Total Protein


 6.9 g/dL


(6.4-8.2) 





 


Albumin


 3.4 g/dL


(3.4-5.0) 





 


Albumin/Globulin Ratio 1.0 (1.0-1.7)  











Images


Images





Exam: CT of chest with contrast





INDICATION: Chest pain





TECHNIQUE: Sequential axial images through the chest obtained following the 

administration of 90 mL of Isovue-370 IV contrast. Sagittal and coronal 

reformatted images were reconstructed from the axial data and reviewed. 3-D 

reformatted images were reconstructed from the axial data and reviewed.





Exposure: One or more of the following in the visualized dose reduction 

techniques were utilized for this examination:


1.  Automated exposure control


2.  Adjustment of the MA and/or KV according to patient size


3.  Use of iterative of reconstructive technique





Comparisons: Chest x-ray same day





FINDINGS:


Visualized portions of the thyroid are unremarkable. No enlarged mediastinal 

lymph nodes are identified.





Heart size is at the upper limits of normal. Pacer with leads terminating the 

right heart. No pericardial effusion. Thoracic aorta has normal course and 

caliber. Pulmonary artery is not enlarged.





Airways are patent. No consolidation or pneumothorax. No suspicious lung 

nodules.





No pleural thickening.





Visualized upper abdomen is unremarkable.





 No suspicious osseous lesions or acute fractures.





IMPRESSION:


No pulmonary embolus identified within the main, lobar or segmental pulmonary 

arteries.








Electronically signed by: Sinan Sinclair MD (7/2/2021 6:39 PM) [facility]














DICTATED and SIGNED BY:     SINAN SINCLAIR MD


DATE:     07/02/21 [id number]





VTE Prophylaxis Ordered


VTE Prophylaxis Devices:  No


VTE Pharmacological Prophylaxi:  Yes





Assessment/Plan


Assessment/Plan


IMPRESSION ===============








Chest pain: //  angina. Trops nml, no acute EKG changes //  troponin neg x 2 


Chronic systolic CHF; clinically compensated 


ICM; s/p AICD ([employer])


CAD; recent [facility] 10/2020, patent stents no intervenable lesions  to nondominat

RCA and OM


Hx of VT: not on antiarrhythmic therapy. 


HTN: controlled


HLP: not on goal


H/o substance abuse: meth use. with relapse


Tobaccoism with likely COPD


MEDICAL noncompliance, severe


Normocytic anemia

















PLAN==============











ADMITTED AS INPATIENT


CVC BED


TREND TROPONIN 


Cardiology consult





Justifications for Admission


Other Justification


ACS











TRU TREVINO MD           Jul 3, 2021 09:25 None